# Patient Record
Sex: FEMALE | Race: WHITE | Employment: UNEMPLOYED | URBAN - METROPOLITAN AREA
[De-identification: names, ages, dates, MRNs, and addresses within clinical notes are randomized per-mention and may not be internally consistent; named-entity substitution may affect disease eponyms.]

---

## 2018-11-07 ENCOUNTER — HOSPITAL ENCOUNTER (INPATIENT)
Age: 58
LOS: 1 days | Discharge: HOME | DRG: 383 | End: 2018-11-08
Attending: EMERGENCY MEDICINE | Admitting: INTERNAL MEDICINE
Payer: MEDICAID

## 2018-11-07 DIAGNOSIS — L03.119 CELLULITIS OF LOWER EXTREMITY, UNSPECIFIED LATERALITY: Primary | ICD-10-CM

## 2018-11-07 PROBLEM — R60.0 LEG EDEMA, LEFT: Status: ACTIVE | Noted: 2018-11-07

## 2018-11-07 PROBLEM — R79.89 ELEVATED LFTS: Status: ACTIVE | Noted: 2018-11-07

## 2018-11-07 PROBLEM — F11.10 OPIOID ABUSE (HCC): Status: ACTIVE | Noted: 2018-11-07

## 2018-11-07 PROBLEM — L03.116 CELLULITIS OF LEFT LOWER EXTREMITY: Status: ACTIVE | Noted: 2018-11-07

## 2018-11-07 PROBLEM — G40.909 SEIZURE DISORDER (HCC): Status: ACTIVE | Noted: 2018-11-07

## 2018-11-07 LAB
A/G RATIO: 0.8 (ref 1.1–2.2)
ALBUMIN SERPL-MCNC: 3.1 G/DL (ref 3.4–5)
ALP BLD-CCNC: 105 U/L (ref 40–129)
ALT SERPL-CCNC: 56 U/L (ref 10–40)
AMPHETAMINE SCREEN, URINE: ABNORMAL
ANION GAP SERPL CALCULATED.3IONS-SCNC: 8 MMOL/L (ref 3–16)
APTT: 27.4 SEC (ref 26–36)
AST SERPL-CCNC: 71 U/L (ref 15–37)
BARBITURATE SCREEN URINE: ABNORMAL
BASOPHILS ABSOLUTE: 0.1 K/UL (ref 0–0.2)
BASOPHILS RELATIVE PERCENT: 1.3 %
BENZODIAZEPINE SCREEN, URINE: ABNORMAL
BILIRUB SERPL-MCNC: 0.4 MG/DL (ref 0–1)
BUN BLDV-MCNC: 10 MG/DL (ref 7–20)
CALCIUM SERPL-MCNC: 9.3 MG/DL (ref 8.3–10.6)
CANNABINOID SCREEN URINE: ABNORMAL
CHLORIDE BLD-SCNC: 100 MMOL/L (ref 99–110)
CO2: 31 MMOL/L (ref 21–32)
COCAINE METABOLITE SCREEN URINE: ABNORMAL
CREAT SERPL-MCNC: <0.5 MG/DL (ref 0.6–1.1)
D DIMER: 4243 NG/ML DDU (ref 0–229)
EOSINOPHILS ABSOLUTE: 0.4 K/UL (ref 0–0.6)
EOSINOPHILS RELATIVE PERCENT: 5.5 %
GFR AFRICAN AMERICAN: >60
GFR NON-AFRICAN AMERICAN: >60
GLOBULIN: 3.9 G/DL
GLUCOSE BLD-MCNC: 111 MG/DL (ref 70–99)
HCT VFR BLD CALC: 37.8 % (ref 36–48)
HEMOGLOBIN: 12.6 G/DL (ref 12–16)
INR BLD: 1.04 (ref 0.86–1.14)
LACTIC ACID: 1 MMOL/L (ref 0.4–2)
LYMPHOCYTES ABSOLUTE: 2.5 K/UL (ref 1–5.1)
LYMPHOCYTES RELATIVE PERCENT: 31.1 %
Lab: ABNORMAL
MCH RBC QN AUTO: 29.1 PG (ref 26–34)
MCHC RBC AUTO-ENTMCNC: 33.2 G/DL (ref 31–36)
MCV RBC AUTO: 87.7 FL (ref 80–100)
METHADONE SCREEN, URINE: ABNORMAL
MONOCYTES ABSOLUTE: 0.9 K/UL (ref 0–1.3)
MONOCYTES RELATIVE PERCENT: 10.6 %
NEUTROPHILS ABSOLUTE: 4.2 K/UL (ref 1.7–7.7)
NEUTROPHILS RELATIVE PERCENT: 51.5 %
OPIATE SCREEN URINE: POSITIVE
OXYCODONE URINE: ABNORMAL
PDW BLD-RTO: 16.3 % (ref 12.4–15.4)
PH UA: 6.5
PHENCYCLIDINE SCREEN URINE: ABNORMAL
PLATELET # BLD: 246 K/UL (ref 135–450)
PMV BLD AUTO: 7.5 FL (ref 5–10.5)
POTASSIUM SERPL-SCNC: 4.2 MMOL/L (ref 3.5–5.1)
PROPOXYPHENE SCREEN: ABNORMAL
PROTHROMBIN TIME: 11.8 SEC (ref 9.8–13)
RBC # BLD: 4.31 M/UL (ref 4–5.2)
SODIUM BLD-SCNC: 139 MMOL/L (ref 136–145)
TOTAL PROTEIN: 7 G/DL (ref 6.4–8.2)
WBC # BLD: 8.1 K/UL (ref 4–11)

## 2018-11-07 PROCEDURE — 96372 THER/PROPH/DIAG INJ SC/IM: CPT

## 2018-11-07 PROCEDURE — 6360000002 HC RX W HCPCS: Performed by: PHYSICIAN ASSISTANT

## 2018-11-07 PROCEDURE — 83605 ASSAY OF LACTIC ACID: CPT

## 2018-11-07 PROCEDURE — 36415 COLL VENOUS BLD VENIPUNCTURE: CPT

## 2018-11-07 PROCEDURE — 2580000003 HC RX 258: Performed by: INTERNAL MEDICINE

## 2018-11-07 PROCEDURE — 87040 BLOOD CULTURE FOR BACTERIA: CPT

## 2018-11-07 PROCEDURE — 85379 FIBRIN DEGRADATION QUANT: CPT

## 2018-11-07 PROCEDURE — 2500000003 HC RX 250 WO HCPCS: Performed by: EMERGENCY MEDICINE

## 2018-11-07 PROCEDURE — 85730 THROMBOPLASTIN TIME PARTIAL: CPT

## 2018-11-07 PROCEDURE — 86803 HEPATITIS C AB TEST: CPT

## 2018-11-07 PROCEDURE — 80053 COMPREHEN METABOLIC PANEL: CPT

## 2018-11-07 PROCEDURE — 6360000002 HC RX W HCPCS: Performed by: EMERGENCY MEDICINE

## 2018-11-07 PROCEDURE — 99219 PR INITIAL OBSERVATION CARE/DAY 50 MINUTES: CPT | Performed by: INTERNAL MEDICINE

## 2018-11-07 PROCEDURE — 2580000003 HC RX 258: Performed by: PHYSICIAN ASSISTANT

## 2018-11-07 PROCEDURE — G0378 HOSPITAL OBSERVATION PER HR: HCPCS

## 2018-11-07 PROCEDURE — 80307 DRUG TEST PRSMV CHEM ANLYZR: CPT

## 2018-11-07 PROCEDURE — 85610 PROTHROMBIN TIME: CPT

## 2018-11-07 PROCEDURE — 85025 COMPLETE CBC W/AUTO DIFF WBC: CPT

## 2018-11-07 PROCEDURE — 99284 EMERGENCY DEPT VISIT MOD MDM: CPT

## 2018-11-07 PROCEDURE — 1200000000 HC SEMI PRIVATE

## 2018-11-07 PROCEDURE — 96365 THER/PROPH/DIAG IV INF INIT: CPT

## 2018-11-07 PROCEDURE — 6370000000 HC RX 637 (ALT 250 FOR IP): Performed by: PHYSICIAN ASSISTANT

## 2018-11-07 PROCEDURE — 2500000003 HC RX 250 WO HCPCS: Performed by: INTERNAL MEDICINE

## 2018-11-07 RX ORDER — GABAPENTIN 400 MG/1
800 CAPSULE ORAL 3 TIMES DAILY
Status: DISCONTINUED | OUTPATIENT
Start: 2018-11-07 | End: 2018-11-08 | Stop reason: HOSPADM

## 2018-11-07 RX ORDER — SODIUM CHLORIDE 0.9 % (FLUSH) 0.9 %
10 SYRINGE (ML) INJECTION PRN
Status: DISCONTINUED | OUTPATIENT
Start: 2018-11-07 | End: 2018-11-08 | Stop reason: HOSPADM

## 2018-11-07 RX ORDER — ONDANSETRON 2 MG/ML
4 INJECTION INTRAMUSCULAR; INTRAVENOUS EVERY 6 HOURS PRN
Status: DISCONTINUED | OUTPATIENT
Start: 2018-11-07 | End: 2018-11-08 | Stop reason: HOSPADM

## 2018-11-07 RX ORDER — CLINDAMYCIN PHOSPHATE 600 MG/50ML
600 INJECTION INTRAVENOUS ONCE
Status: COMPLETED | OUTPATIENT
Start: 2018-11-07 | End: 2018-11-07

## 2018-11-07 RX ORDER — CLINDAMYCIN PHOSPHATE 300 MG/50ML
300 INJECTION INTRAVENOUS EVERY 8 HOURS
Status: DISCONTINUED | OUTPATIENT
Start: 2018-11-07 | End: 2018-11-07

## 2018-11-07 RX ORDER — LACTOBACILLUS RHAMNOSUS GG 10B CELL
1 CAPSULE ORAL
Status: DISCONTINUED | OUTPATIENT
Start: 2018-11-08 | End: 2018-11-08 | Stop reason: HOSPADM

## 2018-11-07 RX ORDER — CLINDAMYCIN PHOSPHATE 300 MG/50ML
300 INJECTION INTRAVENOUS EVERY 8 HOURS
Status: DISCONTINUED | OUTPATIENT
Start: 2018-11-07 | End: 2018-11-07 | Stop reason: SDUPTHER

## 2018-11-07 RX ORDER — SODIUM CHLORIDE 0.9 % (FLUSH) 0.9 %
10 SYRINGE (ML) INJECTION EVERY 12 HOURS SCHEDULED
Status: DISCONTINUED | OUTPATIENT
Start: 2018-11-07 | End: 2018-11-08 | Stop reason: HOSPADM

## 2018-11-07 RX ADMIN — ENOXAPARIN SODIUM 50 MG: 60 INJECTION SUBCUTANEOUS at 13:54

## 2018-11-07 RX ADMIN — Medication 10 ML: at 21:32

## 2018-11-07 RX ADMIN — GABAPENTIN 800 MG: 400 CAPSULE ORAL at 16:53

## 2018-11-07 RX ADMIN — ENOXAPARIN SODIUM 50 MG: 100 INJECTION SUBCUTANEOUS at 21:32

## 2018-11-07 RX ADMIN — DEXTROSE MONOHYDRATE 300 MG: 5 INJECTION, SOLUTION INTRAVENOUS at 21:32

## 2018-11-07 RX ADMIN — CLINDAMYCIN PHOSPHATE 600 MG: 12 INJECTION, SOLUTION INTRAVENOUS at 13:01

## 2018-11-07 ASSESSMENT — ENCOUNTER SYMPTOMS
SORE THROAT: 0
VOMITING: 0
NAUSEA: 0
SHORTNESS OF BREATH: 0
DIARRHEA: 0
ABDOMINAL PAIN: 0

## 2018-11-07 ASSESSMENT — PAIN SCALES - GENERAL: PAINLEVEL_OUTOF10: 7

## 2018-11-07 NOTE — ED NOTES
PT IS VERY DROWSY AT THIS TIME. PT PLACED ON O2 DUE TO SPO2 88%. DR ANDREA INFORMED.      Mel Mcgrath, RN  11/07/18 1698

## 2018-11-07 NOTE — ED NOTES
When walked in room pt was dowdy and when I asked if I could cath her she said yes. Mina Contreras in with kit and pt was helping me then she states there is no way she is go;ing to let me cath her. Walked pt to the bathroom and no urine was given. Pt was falling asleep on toilet.  Walked pt back to bed and placed on monitor      Luisa Carson RN  11/07/18 7491

## 2018-11-07 NOTE — ED PROVIDER NOTES
Ira Hermosillo is a 66 y.o. female presents with foot swelling onset four days in her left leg and onset today in her right leg. She states that it started itching and then the swelling came after. She denies heart problems, kidney problems ulcers or diabetes. She smokes one pack a day and started when she was 15years old. She does smoke marijuana but has never used IV drugs. HPI    Review of Systems   Constitutional: Negative for chills and fever. HENT: Negative for congestion and sore throat. Eyes: Negative for visual disturbance. Respiratory: Negative for shortness of breath. Cardiovascular: Negative for chest pain. Gastrointestinal: Negative for abdominal pain, diarrhea, nausea and vomiting. Genitourinary: Negative for difficulty urinating. Musculoskeletal: Negative for neck pain. Skin: Negative for wound. Neurological: Negative for headaches. PAST MEDICAL HISTORY   has a past medical history of Back pain and Seizures ([facility] [address] PAST SURGICAL HISTORY   has a past surgical history that includes Appendectomy; Carpal tunnel release; Tubal ligation; Skin graft;  section; and Rotator cuff repair (Right). FAMILY HISTORY  family history is not on file. SOCIAL HISTORY   reports that she has been smoking. She has been smoking about 1.00 pack per day. She has never used smokeless tobacco. She reports that she does not drink alcohol or use drugs. HOME MEDICATIONS     Prior to Admission medications    Medication Sig Start Date End Date Taking? Authorizing Provider   gabapentin (NEURONTIN) 300 MG capsule Take 800 mg by mouth 3 times daily. take 1 capsule by oral route 3 times every day. 8/26/15 11/29/16  Historical Provider, MD        ALLERGIES  is allergic to flexeril [cyclobenzaprine]; ultram [tramadol hcl]; and robaxin [methocarbamol].       /67   Pulse 100   Temp 98.3 °F (36.8 °C) (Oral)   Resp 16   Ht 5' 6\" (1.676 m)   Wt 108 lb (49 kg)   LMP 2012 SpO2 94%   BMI 17.43 kg/m²      Physical Exam   Constitutional: She is oriented to person, place, and time. She appears well-developed. No distress. HENT:   Head: Normocephalic and atraumatic. Right Ear: Tympanic membrane and external ear normal.   Left Ear: Tympanic membrane and external ear normal.   Mouth/Throat: No oropharyngeal exudate, posterior oropharyngeal edema or posterior oropharyngeal erythema. Eyes: Pupils are equal, round, and reactive to light. Conjunctivae and EOM are normal.   Neck: Normal range of motion. Neck supple. No JVD present. Cardiovascular: Normal rate, regular rhythm and intact distal pulses. Exam reveals no gallop and no friction rub. No murmur heard. Pulmonary/Chest: Effort normal and breath sounds normal. No stridor. No respiratory distress. She has no wheezes. She has no rales. She exhibits no tenderness. Abdominal: Soft. Bowel sounds are normal. She exhibits no distension and no mass. There is no tenderness. There is no rigidity, no rebound and no guarding. Musculoskeletal: Normal range of motion. She exhibits edema. She exhibits no deformity. Neurological: She is alert and oriented to person, place, and time. She has normal strength. No cranial nerve deficit or sensory deficit. She exhibits normal muscle tone. Coordination normal. GCS eye subscore is 4. GCS verbal subscore is 5. GCS motor subscore is 6. Skin: Skin is warm and dry. Capillary refill takes less than 2 seconds. No ecchymosis, no petechiae and no rash noted. She is not diaphoretic. There is erythema. No pallor. Psychiatric: She has a normal mood and affect.  Her behavior is normal.       Procedures    MDM      Labs  Results for orders placed or performed during the hospital encounter of 11/07/18   CBC auto differential   Result Value Ref Range    WBC 8.1 4.0 - 11.0 K/uL    RBC 4.31 4.00 - 5.20 M/uL    Hemoglobin 12.6 12.0 - 16.0 g/dL    Hematocrit 37.8 36.0 - 48.0 %    MCV 87.7 80.0 - 100.0

## 2018-11-08 VITALS
BODY MASS INDEX: 17.36 KG/M2 | OXYGEN SATURATION: 90 % | HEIGHT: 66 IN | WEIGHT: 108 LBS | TEMPERATURE: 97.6 F | RESPIRATION RATE: 18 BRPM | HEART RATE: 74 BPM | SYSTOLIC BLOOD PRESSURE: 94 MMHG | DIASTOLIC BLOOD PRESSURE: 60 MMHG

## 2018-11-08 LAB
ANION GAP SERPL CALCULATED.3IONS-SCNC: 5 MMOL/L (ref 3–16)
BASOPHILS ABSOLUTE: 0.1 K/UL (ref 0–0.2)
BASOPHILS RELATIVE PERCENT: 1.2 %
BUN BLDV-MCNC: 7 MG/DL (ref 7–20)
CALCIUM SERPL-MCNC: 8.5 MG/DL (ref 8.3–10.6)
CHLORIDE BLD-SCNC: 104 MMOL/L (ref 99–110)
CO2: 30 MMOL/L (ref 21–32)
CREAT SERPL-MCNC: <0.5 MG/DL (ref 0.6–1.1)
EOSINOPHILS ABSOLUTE: 0.3 K/UL (ref 0–0.6)
EOSINOPHILS RELATIVE PERCENT: 7.4 %
GFR AFRICAN AMERICAN: >60
GFR NON-AFRICAN AMERICAN: >60
GLUCOSE BLD-MCNC: 88 MG/DL (ref 70–99)
HCT VFR BLD CALC: 39.5 % (ref 36–48)
HEMOGLOBIN: 13.3 G/DL (ref 12–16)
HEPATITIS C ANTIBODY INTERPRETATION: REACTIVE
LYMPHOCYTES ABSOLUTE: 2.1 K/UL (ref 1–5.1)
LYMPHOCYTES RELATIVE PERCENT: 44.4 %
MCH RBC QN AUTO: 29.5 PG (ref 26–34)
MCHC RBC AUTO-ENTMCNC: 33.5 G/DL (ref 31–36)
MCV RBC AUTO: 87.8 FL (ref 80–100)
MONOCYTES ABSOLUTE: 0.5 K/UL (ref 0–1.3)
MONOCYTES RELATIVE PERCENT: 10.4 %
NEUTROPHILS ABSOLUTE: 1.7 K/UL (ref 1.7–7.7)
NEUTROPHILS RELATIVE PERCENT: 36.6 %
PDW BLD-RTO: 16.5 % (ref 12.4–15.4)
PLATELET # BLD: 221 K/UL (ref 135–450)
PMV BLD AUTO: 7.4 FL (ref 5–10.5)
POTASSIUM REFLEX MAGNESIUM: 4.1 MMOL/L (ref 3.5–5.1)
RBC # BLD: 4.5 M/UL (ref 4–5.2)
SODIUM BLD-SCNC: 139 MMOL/L (ref 136–145)
WBC # BLD: 4.7 K/UL (ref 4–11)

## 2018-11-08 PROCEDURE — 6360000002 HC RX W HCPCS: Performed by: PHYSICIAN ASSISTANT

## 2018-11-08 PROCEDURE — 80048 BASIC METABOLIC PNL TOTAL CA: CPT

## 2018-11-08 PROCEDURE — 96366 THER/PROPH/DIAG IV INF ADDON: CPT

## 2018-11-08 PROCEDURE — 93970 EXTREMITY STUDY: CPT

## 2018-11-08 PROCEDURE — 2580000003 HC RX 258: Performed by: PHYSICIAN ASSISTANT

## 2018-11-08 PROCEDURE — 2500000003 HC RX 250 WO HCPCS: Performed by: INTERNAL MEDICINE

## 2018-11-08 PROCEDURE — 2580000003 HC RX 258: Performed by: INTERNAL MEDICINE

## 2018-11-08 PROCEDURE — 85025 COMPLETE CBC W/AUTO DIFF WBC: CPT

## 2018-11-08 PROCEDURE — G0378 HOSPITAL OBSERVATION PER HR: HCPCS

## 2018-11-08 PROCEDURE — 96372 THER/PROPH/DIAG INJ SC/IM: CPT

## 2018-11-08 PROCEDURE — 36415 COLL VENOUS BLD VENIPUNCTURE: CPT

## 2018-11-08 PROCEDURE — 6370000000 HC RX 637 (ALT 250 FOR IP): Performed by: PHYSICIAN ASSISTANT

## 2018-11-08 PROCEDURE — 99217 PR OBSERVATION CARE DISCHARGE MANAGEMENT: CPT | Performed by: INTERNAL MEDICINE

## 2018-11-08 RX ORDER — CLINDAMYCIN HYDROCHLORIDE 150 MG/1
300 CAPSULE ORAL 3 TIMES DAILY
Qty: 42 CAPSULE | Refills: 0 | Status: SHIPPED | OUTPATIENT
Start: 2018-11-08 | End: 2018-11-15

## 2018-11-08 RX ADMIN — GABAPENTIN 800 MG: 400 CAPSULE ORAL at 08:31

## 2018-11-08 RX ADMIN — DEXTROSE MONOHYDRATE 300 MG: 5 INJECTION, SOLUTION INTRAVENOUS at 05:38

## 2018-11-08 RX ADMIN — ENOXAPARIN SODIUM 50 MG: 100 INJECTION SUBCUTANEOUS at 08:31

## 2018-11-08 RX ADMIN — Medication 10 ML: at 08:32

## 2018-11-08 RX ADMIN — Medication 1 CAPSULE: at 08:31

## 2018-11-08 NOTE — FLOWSHEET NOTE
11/08/18 0803   Vital Signs   Temp 97.6 °F (36.4 °C)   Temp Source Oral   Pulse 74   Heart Rate Source Monitor   Resp 18   BP 94/60   BP Location Left lower arm   BP Upper/Lower Lower   Patient Position Semi fowlers   Level of Consciousness 0   MEWS Score 2   Oxygen Therapy   SpO2 90 %   O2 Device None (Room air)   Pt laying in bed on right side. Easily arousable. AM assessment complete. Call light in reach.

## 2018-11-08 NOTE — PLAN OF CARE
Problem: Safety:  Goal: Free from accidental physical injury  Free from accidental physical injury   Outcome: Ongoing      Problem: Pain:  Goal: Patient's pain/discomfort is manageable  Patient's pain/discomfort is manageable   Outcome: Ongoing      Problem: Pain:  Goal: Pain level will decrease  Pain level will decrease   Outcome: Ongoing

## 2018-11-08 NOTE — DISCHARGE SUMMARY
Where to Get Your Medications      These medications were sent to  [facility], [address] [facility] [address]    Phone:  [phone]   · clindamycin 150 MG capsule           Discharged in stable condition to home    Follow Up:   Follow up with PCP in 1 week and GI for follow up for +Hep C Ab    Carolina Mcnulty 11:50 AM 11/8/2018

## 2018-11-08 NOTE — PROGRESS NOTES
Shift assessment completed. Pt is alert and oriented. Pt unable to stay awake. Keeps closing eyes mid sentence. When ask her why she is having issues staying awake she states she lost a cousin yesterday and did not get any sleep. Vital signs are WNL. Respirations are even & easy. Pt complains of pain in her back and request her gabapentin. Explained to her she will not receive the medication until she can remain more alert. Pt becomes upset stating she wont be able to sleep without it. I told patient if she has problems sleeping to let me know. Pt asleep before this nurse leaves room. SR up x 2 and bed in low position. Call light is within reach. Will monitor.

## 2018-11-13 LAB
BLOOD CULTURE, ROUTINE: NORMAL
CULTURE, BLOOD 2: NORMAL

## 2019-01-26 ENCOUNTER — HOSPITAL ENCOUNTER (EMERGENCY)
Age: 59
Discharge: HOME | End: 2019-01-26
Attending: EMERGENCY MEDICINE
Payer: MEDICAID

## 2019-01-26 ENCOUNTER — APPOINTMENT (OUTPATIENT)
Dept: CT IMAGING | Age: 59
End: 2019-01-26
Payer: MEDICAID

## 2019-01-26 VITALS
SYSTOLIC BLOOD PRESSURE: 123 MMHG | OXYGEN SATURATION: 98 % | WEIGHT: 128 LBS | HEART RATE: 78 BPM | RESPIRATION RATE: 12 BRPM | TEMPERATURE: 98.1 F | DIASTOLIC BLOOD PRESSURE: 74 MMHG | HEIGHT: 67 IN | BODY MASS INDEX: 20.09 KG/M2

## 2019-01-26 DIAGNOSIS — R10.30 LOWER ABDOMINAL PAIN: Primary | ICD-10-CM

## 2019-01-26 LAB
A/G RATIO: 0.7 (ref 1.1–2.2)
ALBUMIN SERPL-MCNC: 3.3 G/DL (ref 3.4–5)
ALP BLD-CCNC: 125 U/L (ref 40–129)
ALT SERPL-CCNC: 65 U/L (ref 10–40)
ANION GAP SERPL CALCULATED.3IONS-SCNC: 8 MMOL/L (ref 3–16)
AST SERPL-CCNC: 77 U/L (ref 15–37)
BASOPHILS ABSOLUTE: 0.1 K/UL (ref 0–0.2)
BASOPHILS RELATIVE PERCENT: 1.7 %
BILIRUB SERPL-MCNC: 0.4 MG/DL (ref 0–1)
BILIRUBIN URINE: NEGATIVE
BLOOD, URINE: NEGATIVE
BUN BLDV-MCNC: 8 MG/DL (ref 7–20)
CALCIUM SERPL-MCNC: 9.2 MG/DL (ref 8.3–10.6)
CHLORIDE BLD-SCNC: 104 MMOL/L (ref 99–110)
CLARITY: CLEAR
CO2: 27 MMOL/L (ref 21–32)
COLOR: YELLOW
CREAT SERPL-MCNC: <0.5 MG/DL (ref 0.6–1.1)
EOSINOPHILS ABSOLUTE: 0.3 K/UL (ref 0–0.6)
EOSINOPHILS RELATIVE PERCENT: 4.1 %
GFR AFRICAN AMERICAN: >60
GFR NON-AFRICAN AMERICAN: >60
GLOBULIN: 4.6 G/DL
GLUCOSE BLD-MCNC: 100 MG/DL (ref 70–99)
GLUCOSE URINE: NEGATIVE MG/DL
HCT VFR BLD CALC: 42.1 % (ref 36–48)
HEMOGLOBIN: 13.7 G/DL (ref 12–16)
INR BLD: 1.05 (ref 0.86–1.14)
KETONES, URINE: NEGATIVE MG/DL
LEUKOCYTE ESTERASE, URINE: NEGATIVE
LYMPHOCYTES ABSOLUTE: 2.5 K/UL (ref 1–5.1)
LYMPHOCYTES RELATIVE PERCENT: 30.7 %
MAGNESIUM: 1.8 MG/DL (ref 1.8–2.4)
MCH RBC QN AUTO: 29.2 PG (ref 26–34)
MCHC RBC AUTO-ENTMCNC: 32.6 G/DL (ref 31–36)
MCV RBC AUTO: 89.4 FL (ref 80–100)
MICROSCOPIC EXAMINATION: NORMAL
MONOCYTES ABSOLUTE: 0.6 K/UL (ref 0–1.3)
MONOCYTES RELATIVE PERCENT: 6.7 %
NEUTROPHILS ABSOLUTE: 4.7 K/UL (ref 1.7–7.7)
NEUTROPHILS RELATIVE PERCENT: 56.8 %
NITRITE, URINE: NEGATIVE
PDW BLD-RTO: 15.1 % (ref 12.4–15.4)
PH UA: 6
PLATELET # BLD: 294 K/UL (ref 135–450)
PMV BLD AUTO: 7.9 FL (ref 5–10.5)
POTASSIUM SERPL-SCNC: 4.4 MMOL/L (ref 3.5–5.1)
PROTEIN UA: NEGATIVE MG/DL
PROTHROMBIN TIME: 12 SEC (ref 9.8–13)
RBC # BLD: 4.71 M/UL (ref 4–5.2)
SODIUM BLD-SCNC: 139 MMOL/L (ref 136–145)
SPECIFIC GRAVITY UA: <=1.005
TOTAL PROTEIN: 7.9 G/DL (ref 6.4–8.2)
URINE TYPE: NORMAL
UROBILINOGEN, URINE: 0.2 E.U./DL
WBC # BLD: 8.3 K/UL (ref 4–11)

## 2019-01-26 PROCEDURE — 83735 ASSAY OF MAGNESIUM: CPT

## 2019-01-26 PROCEDURE — 85610 PROTHROMBIN TIME: CPT

## 2019-01-26 PROCEDURE — 36415 COLL VENOUS BLD VENIPUNCTURE: CPT

## 2019-01-26 PROCEDURE — 85025 COMPLETE CBC W/AUTO DIFF WBC: CPT

## 2019-01-26 PROCEDURE — 99284 EMERGENCY DEPT VISIT MOD MDM: CPT

## 2019-01-26 PROCEDURE — 2580000003 HC RX 258: Performed by: EMERGENCY MEDICINE

## 2019-01-26 PROCEDURE — 6360000004 HC RX CONTRAST MEDICATION: Performed by: EMERGENCY MEDICINE

## 2019-01-26 PROCEDURE — 74177 CT ABD & PELVIS W/CONTRAST: CPT

## 2019-01-26 PROCEDURE — 80053 COMPREHEN METABOLIC PANEL: CPT

## 2019-01-26 PROCEDURE — 81003 URINALYSIS AUTO W/O SCOPE: CPT

## 2019-01-26 RX ORDER — 0.9 % SODIUM CHLORIDE 0.9 %
1000 INTRAVENOUS SOLUTION INTRAVENOUS ONCE
Status: COMPLETED | OUTPATIENT
Start: 2019-01-26 | End: 2019-01-26

## 2019-01-26 RX ORDER — ONDANSETRON HYDROCHLORIDE 8 MG/1
8 TABLET, FILM COATED ORAL EVERY 8 HOURS PRN
Qty: 10 TABLET | Refills: 0 | Status: SHIPPED | OUTPATIENT
Start: 2019-01-26 | End: 2019-05-25 | Stop reason: ALTCHOICE

## 2019-01-26 RX ORDER — DIPHENOXYLATE HYDROCHLORIDE AND ATROPINE SULFATE 2.5; .025 MG/1; MG/1
1 TABLET ORAL 4 TIMES DAILY PRN
Qty: 10 TABLET | Refills: 0 | Status: SHIPPED | OUTPATIENT
Start: 2019-01-26 | End: 2019-02-05

## 2019-01-26 RX ADMIN — SODIUM CHLORIDE 1000 ML: 9 INJECTION, SOLUTION INTRAVENOUS at 15:56

## 2019-01-26 RX ADMIN — IOPAMIDOL 75 ML: 755 INJECTION, SOLUTION INTRAVENOUS at 16:53

## 2019-01-26 ASSESSMENT — ENCOUNTER SYMPTOMS
ABDOMINAL PAIN: 1
SHORTNESS OF BREATH: 0
CONSTIPATION: 0
ABDOMINAL DISTENTION: 0
BACK PAIN: 0
NAUSEA: 1
DIARRHEA: 1
RECTAL PAIN: 0
VOMITING: 0

## 2019-01-26 ASSESSMENT — PAIN SCALES - GENERAL: PAINLEVEL_OUTOF10: 6

## 2019-01-26 ASSESSMENT — PAIN DESCRIPTION - LOCATION: LOCATION: ABDOMEN

## 2019-01-26 ASSESSMENT — PAIN DESCRIPTION - ORIENTATION: ORIENTATION: MID;RIGHT

## 2019-01-26 ASSESSMENT — PAIN DESCRIPTION - DESCRIPTORS: DESCRIPTORS: SHARP

## 2019-01-26 ASSESSMENT — PAIN DESCRIPTION - PAIN TYPE: TYPE: ACUTE PAIN

## 2019-05-25 ENCOUNTER — HOSPITAL ENCOUNTER (INPATIENT)
Age: 59
LOS: 2 days | Discharge: LEFT BEFORE DISCHARGE | DRG: 812 | End: 2019-05-27
Attending: HOSPITALIST | Admitting: HOSPITALIST
Payer: MEDICAID

## 2019-05-25 ENCOUNTER — APPOINTMENT (OUTPATIENT)
Dept: GENERAL RADIOLOGY | Age: 59
End: 2019-05-25
Payer: MEDICAID

## 2019-05-25 ENCOUNTER — APPOINTMENT (OUTPATIENT)
Dept: CT IMAGING | Age: 59
End: 2019-05-25
Payer: MEDICAID

## 2019-05-25 ENCOUNTER — HOSPITAL ENCOUNTER (EMERGENCY)
Age: 59
Discharge: ANOTHER HOSPITAL | End: 2019-05-25
Attending: EMERGENCY MEDICINE
Payer: MEDICAID

## 2019-05-25 VITALS
DIASTOLIC BLOOD PRESSURE: 72 MMHG | HEART RATE: 92 BPM | SYSTOLIC BLOOD PRESSURE: 109 MMHG | OXYGEN SATURATION: 97 % | BODY MASS INDEX: 18.83 KG/M2 | TEMPERATURE: 98.2 F | RESPIRATION RATE: 20 BRPM | WEIGHT: 120 LBS | HEIGHT: 67 IN

## 2019-05-25 DIAGNOSIS — F15.10 METHAMPHETAMINE ABUSE (HCC): ICD-10-CM

## 2019-05-25 DIAGNOSIS — R56.9 SEIZURE (HCC): ICD-10-CM

## 2019-05-25 DIAGNOSIS — F15.929 METHAMPHETAMINE INTOXICATION (HCC): Primary | ICD-10-CM

## 2019-05-25 PROBLEM — G93.40 ACUTE ENCEPHALOPATHY: Status: ACTIVE | Noted: 2019-05-25

## 2019-05-25 LAB
A/G RATIO: 0.7 (ref 1.1–2.2)
ACETAMINOPHEN LEVEL: <5 UG/ML (ref 10–30)
ALBUMIN SERPL-MCNC: 3.1 G/DL (ref 3.4–5)
ALBUMIN SERPL-MCNC: 3.5 G/DL (ref 3.4–5)
ALP BLD-CCNC: 132 U/L (ref 40–129)
ALP BLD-CCNC: 152 U/L (ref 40–129)
ALT SERPL-CCNC: 106 U/L (ref 10–40)
ALT SERPL-CCNC: 92 U/L (ref 10–40)
AMPHETAMINE SCREEN, URINE: POSITIVE
ANION GAP SERPL CALCULATED.3IONS-SCNC: 10 MMOL/L (ref 3–16)
ANION GAP SERPL CALCULATED.3IONS-SCNC: 13 MMOL/L (ref 3–16)
AST SERPL-CCNC: 67 U/L (ref 15–37)
AST SERPL-CCNC: 86 U/L (ref 15–37)
BARBITURATE SCREEN URINE: ABNORMAL
BASE EXCESS VENOUS: -1.7 MMOL/L (ref -3–3)
BASOPHILS ABSOLUTE: 0 K/UL (ref 0–0.2)
BASOPHILS ABSOLUTE: 0.1 K/UL (ref 0–0.2)
BASOPHILS RELATIVE PERCENT: 0.3 %
BASOPHILS RELATIVE PERCENT: 0.4 %
BENZODIAZEPINE SCREEN, URINE: ABNORMAL
BILIRUB SERPL-MCNC: 0.6 MG/DL (ref 0–1)
BILIRUB SERPL-MCNC: 0.7 MG/DL (ref 0–1)
BILIRUBIN DIRECT: <0.2 MG/DL (ref 0–0.3)
BILIRUBIN, INDIRECT: ABNORMAL MG/DL (ref 0–1)
BUN BLDV-MCNC: 5 MG/DL (ref 7–20)
BUN BLDV-MCNC: 8 MG/DL (ref 7–20)
CALCIUM SERPL-MCNC: 8.9 MG/DL (ref 8.3–10.6)
CALCIUM SERPL-MCNC: 9.2 MG/DL (ref 8.3–10.6)
CANNABINOID SCREEN URINE: POSITIVE
CARBOXYHEMOGLOBIN: 3.7 % (ref 0–1.5)
CHLORIDE BLD-SCNC: 100 MMOL/L (ref 99–110)
CHLORIDE BLD-SCNC: 104 MMOL/L (ref 99–110)
CO2: 21 MMOL/L (ref 21–32)
CO2: 22 MMOL/L (ref 21–32)
COCAINE METABOLITE SCREEN URINE: ABNORMAL
CREAT SERPL-MCNC: 0.6 MG/DL (ref 0.6–1.1)
CREAT SERPL-MCNC: <0.5 MG/DL (ref 0.6–1.1)
EKG ATRIAL RATE: 88 BPM
EKG DIAGNOSIS: NORMAL
EKG P AXIS: 77 DEGREES
EKG P-R INTERVAL: 134 MS
EKG Q-T INTERVAL: 376 MS
EKG QRS DURATION: 78 MS
EKG QTC CALCULATION (BAZETT): 454 MS
EKG R AXIS: 70 DEGREES
EKG T AXIS: 65 DEGREES
EKG VENTRICULAR RATE: 88 BPM
EOSINOPHILS ABSOLUTE: 0 K/UL (ref 0–0.6)
EOSINOPHILS ABSOLUTE: 0 K/UL (ref 0–0.6)
EOSINOPHILS RELATIVE PERCENT: 0 %
EOSINOPHILS RELATIVE PERCENT: 0 %
ETHANOL: NORMAL MG/DL (ref 0–0.08)
GFR AFRICAN AMERICAN: >60
GFR AFRICAN AMERICAN: >60
GFR NON-AFRICAN AMERICAN: >60
GFR NON-AFRICAN AMERICAN: >60
GLOBULIN: 4.8 G/DL
GLUCOSE BLD-MCNC: 162 MG/DL (ref 70–99)
GLUCOSE BLD-MCNC: 170 MG/DL (ref 70–99)
HCO3 VENOUS: 22.5 MMOL/L (ref 23–29)
HCT VFR BLD CALC: 44.1 % (ref 36–48)
HCT VFR BLD CALC: 45 % (ref 36–48)
HEMOGLOBIN: 14.5 G/DL (ref 12–16)
HEMOGLOBIN: 14.6 G/DL (ref 12–16)
LACTIC ACID: 2.4 MMOL/L (ref 0.4–2)
LYMPHOCYTES ABSOLUTE: 1.5 K/UL (ref 1–5.1)
LYMPHOCYTES ABSOLUTE: 3 K/UL (ref 1–5.1)
LYMPHOCYTES RELATIVE PERCENT: 18.4 %
LYMPHOCYTES RELATIVE PERCENT: 8.7 %
Lab: ABNORMAL
MAGNESIUM: 2.3 MG/DL (ref 1.8–2.4)
MCH RBC QN AUTO: 28.2 PG (ref 26–34)
MCH RBC QN AUTO: 28.8 PG (ref 26–34)
MCHC RBC AUTO-ENTMCNC: 32.5 G/DL (ref 31–36)
MCHC RBC AUTO-ENTMCNC: 32.9 G/DL (ref 31–36)
MCV RBC AUTO: 86.8 FL (ref 80–100)
MCV RBC AUTO: 87.7 FL (ref 80–100)
METHADONE SCREEN, URINE: ABNORMAL
METHEMOGLOBIN VENOUS: 0.1 %
MONOCYTES ABSOLUTE: 0.6 K/UL (ref 0–1.3)
MONOCYTES ABSOLUTE: 1.2 K/UL (ref 0–1.3)
MONOCYTES RELATIVE PERCENT: 3.6 %
MONOCYTES RELATIVE PERCENT: 7.2 %
NEUTROPHILS ABSOLUTE: 12.5 K/UL (ref 1.7–7.7)
NEUTROPHILS ABSOLUTE: 14.3 K/UL (ref 1.7–7.7)
NEUTROPHILS RELATIVE PERCENT: 77.7 %
NEUTROPHILS RELATIVE PERCENT: 83.7 %
O2 CONTENT, VEN: 16 VOL %
O2 SAT, VEN: 78 %
O2 THERAPY: ABNORMAL
OPIATE SCREEN URINE: POSITIVE
OXYCODONE URINE: ABNORMAL
PCO2, VEN: 36.8 MMHG (ref 40–50)
PDW BLD-RTO: 15.9 % (ref 12.4–15.4)
PDW BLD-RTO: 16 % (ref 12.4–15.4)
PH UA: 5
PH VENOUS: 7.4 (ref 7.35–7.45)
PHENCYCLIDINE SCREEN URINE: ABNORMAL
PLATELET # BLD: 263 K/UL (ref 135–450)
PLATELET # BLD: 289 K/UL (ref 135–450)
PMV BLD AUTO: 8 FL (ref 5–10.5)
PMV BLD AUTO: 8 FL (ref 5–10.5)
PO2, VEN: 41.9 MMHG (ref 25–40)
POTASSIUM REFLEX MAGNESIUM: 3.4 MMOL/L (ref 3.5–5.1)
POTASSIUM SERPL-SCNC: 3.3 MMOL/L (ref 3.5–5.1)
PROPOXYPHENE SCREEN: ABNORMAL
RBC # BLD: 5.03 M/UL (ref 4–5.2)
RBC # BLD: 5.18 M/UL (ref 4–5.2)
SALICYLATE, SERUM: <0.3 MG/DL (ref 15–30)
SODIUM BLD-SCNC: 134 MMOL/L (ref 136–145)
SODIUM BLD-SCNC: 136 MMOL/L (ref 136–145)
TCO2 CALC VENOUS: 24 MMOL/L
TOTAL CK: 154 U/L (ref 26–192)
TOTAL PROTEIN: 7.6 G/DL (ref 6.4–8.2)
TOTAL PROTEIN: 8.3 G/DL (ref 6.4–8.2)
TRICYCLIC, URINE: NORMAL
TROPONIN: <0.01 NG/ML
WBC # BLD: 16.1 K/UL (ref 4–11)
WBC # BLD: 17 K/UL (ref 4–11)

## 2019-05-25 PROCEDURE — 71045 X-RAY EXAM CHEST 1 VIEW: CPT

## 2019-05-25 PROCEDURE — G0480 DRUG TEST DEF 1-7 CLASSES: HCPCS

## 2019-05-25 PROCEDURE — 93005 ELECTROCARDIOGRAM TRACING: CPT | Performed by: EMERGENCY MEDICINE

## 2019-05-25 PROCEDURE — 6360000002 HC RX W HCPCS

## 2019-05-25 PROCEDURE — 2580000003 HC RX 258: Performed by: EMERGENCY MEDICINE

## 2019-05-25 PROCEDURE — 6360000002 HC RX W HCPCS: Performed by: HOSPITALIST

## 2019-05-25 PROCEDURE — 80076 HEPATIC FUNCTION PANEL: CPT

## 2019-05-25 PROCEDURE — 6370000000 HC RX 637 (ALT 250 FOR IP): Performed by: NURSE PRACTITIONER

## 2019-05-25 PROCEDURE — 83735 ASSAY OF MAGNESIUM: CPT

## 2019-05-25 PROCEDURE — 84484 ASSAY OF TROPONIN QUANT: CPT

## 2019-05-25 PROCEDURE — 87086 URINE CULTURE/COLONY COUNT: CPT

## 2019-05-25 PROCEDURE — 85025 COMPLETE CBC W/AUTO DIFF WBC: CPT

## 2019-05-25 PROCEDURE — 80053 COMPREHEN METABOLIC PANEL: CPT

## 2019-05-25 PROCEDURE — 6370000000 HC RX 637 (ALT 250 FOR IP): Performed by: HOSPITALIST

## 2019-05-25 PROCEDURE — 83605 ASSAY OF LACTIC ACID: CPT

## 2019-05-25 PROCEDURE — 87040 BLOOD CULTURE FOR BACTERIA: CPT

## 2019-05-25 PROCEDURE — 99291 CRITICAL CARE FIRST HOUR: CPT

## 2019-05-25 PROCEDURE — 2060000000 HC ICU INTERMEDIATE R&B

## 2019-05-25 PROCEDURE — 36415 COLL VENOUS BLD VENIPUNCTURE: CPT

## 2019-05-25 PROCEDURE — 82550 ASSAY OF CK (CPK): CPT

## 2019-05-25 PROCEDURE — 82803 BLOOD GASES ANY COMBINATION: CPT

## 2019-05-25 PROCEDURE — 80307 DRUG TEST PRSMV CHEM ANLYZR: CPT

## 2019-05-25 PROCEDURE — 80048 BASIC METABOLIC PNL TOTAL CA: CPT

## 2019-05-25 PROCEDURE — 93010 ELECTROCARDIOGRAM REPORT: CPT | Performed by: INTERNAL MEDICINE

## 2019-05-25 PROCEDURE — 2580000003 HC RX 258: Performed by: HOSPITALIST

## 2019-05-25 PROCEDURE — 96374 THER/PROPH/DIAG INJ IV PUSH: CPT

## 2019-05-25 PROCEDURE — 70450 CT HEAD/BRAIN W/O DYE: CPT

## 2019-05-25 RX ORDER — GABAPENTIN 400 MG/1
800 CAPSULE ORAL 2 TIMES DAILY
Status: DISCONTINUED | OUTPATIENT
Start: 2019-05-25 | End: 2019-05-25

## 2019-05-25 RX ORDER — LORAZEPAM 2 MG/ML
INJECTION INTRAMUSCULAR
Status: COMPLETED
Start: 2019-05-25 | End: 2019-05-25

## 2019-05-25 RX ORDER — GABAPENTIN 400 MG/1
400 CAPSULE ORAL 2 TIMES DAILY
Status: DISCONTINUED | OUTPATIENT
Start: 2019-05-25 | End: 2019-05-27 | Stop reason: HOSPADM

## 2019-05-25 RX ORDER — HALOPERIDOL 5 MG/ML
5 INJECTION INTRAMUSCULAR EVERY 4 HOURS PRN
Status: DISCONTINUED | OUTPATIENT
Start: 2019-05-25 | End: 2019-05-27 | Stop reason: HOSPADM

## 2019-05-25 RX ORDER — GABAPENTIN 400 MG/1
400 CAPSULE ORAL 2 TIMES DAILY
Status: DISCONTINUED | OUTPATIENT
Start: 2019-05-26 | End: 2019-05-25

## 2019-05-25 RX ORDER — POTASSIUM CHLORIDE 20 MEQ/1
40 TABLET, EXTENDED RELEASE ORAL ONCE
Status: COMPLETED | OUTPATIENT
Start: 2019-05-25 | End: 2019-05-25

## 2019-05-25 RX ORDER — SODIUM CHLORIDE 0.9 % (FLUSH) 0.9 %
10 SYRINGE (ML) INJECTION EVERY 12 HOURS SCHEDULED
Status: DISCONTINUED | OUTPATIENT
Start: 2019-05-25 | End: 2019-05-27 | Stop reason: HOSPADM

## 2019-05-25 RX ORDER — SODIUM CHLORIDE 0.9 % (FLUSH) 0.9 %
10 SYRINGE (ML) INJECTION PRN
Status: DISCONTINUED | OUTPATIENT
Start: 2019-05-25 | End: 2019-05-27 | Stop reason: HOSPADM

## 2019-05-25 RX ORDER — SODIUM CHLORIDE 9 MG/ML
INJECTION, SOLUTION INTRAVENOUS CONTINUOUS
Status: DISCONTINUED | OUTPATIENT
Start: 2019-05-25 | End: 2019-05-27 | Stop reason: HOSPADM

## 2019-05-25 RX ORDER — ONDANSETRON 2 MG/ML
4 INJECTION INTRAMUSCULAR; INTRAVENOUS EVERY 6 HOURS PRN
Status: DISCONTINUED | OUTPATIENT
Start: 2019-05-25 | End: 2019-05-27 | Stop reason: HOSPADM

## 2019-05-25 RX ORDER — SODIUM CHLORIDE 9 MG/ML
INJECTION, SOLUTION INTRAVENOUS CONTINUOUS
Status: DISCONTINUED | OUTPATIENT
Start: 2019-05-25 | End: 2019-05-25 | Stop reason: HOSPADM

## 2019-05-25 RX ADMIN — SODIUM CHLORIDE, PRESERVATIVE FREE 10 ML: 5 INJECTION INTRAVENOUS at 21:47

## 2019-05-25 RX ADMIN — LORAZEPAM 2 MG: 2 INJECTION INTRAMUSCULAR; INTRAVENOUS at 12:42

## 2019-05-25 RX ADMIN — LEVETIRACETAM 1000 MG: 100 INJECTION, SOLUTION INTRAVENOUS at 18:18

## 2019-05-25 RX ADMIN — POTASSIUM CHLORIDE 40 MEQ: 20 TABLET, EXTENDED RELEASE ORAL at 21:46

## 2019-05-25 RX ADMIN — LORAZEPAM 2 MG: 2 INJECTION INTRAMUSCULAR; INTRAVENOUS at 12:34

## 2019-05-25 RX ADMIN — SODIUM CHLORIDE: 9 INJECTION, SOLUTION INTRAVENOUS at 17:44

## 2019-05-25 RX ADMIN — SODIUM CHLORIDE: 9 INJECTION, SOLUTION INTRAVENOUS at 15:54

## 2019-05-25 RX ADMIN — GABAPENTIN 400 MG: 400 CAPSULE ORAL at 21:47

## 2019-05-25 ASSESSMENT — ENCOUNTER SYMPTOMS: INGESTION: 1

## 2019-05-25 NOTE — ED NOTES
sats 88% placed on 2l per nc. sats up to 95%. Pt requesting for arm to bed let go. Still moving all over bed. Instructed pt I will let right arm be released if she stops pulling at cords. Right arm released.       Zahra Garcia RN  05/25/19 0400

## 2019-05-25 NOTE — PROGRESS NOTES
Pt is alert orient calm and cooperative. She is watching tv. She ordered her cheeseburger with assistance. Pt could not tell me the year. She knew the president and month. keppra infusing.

## 2019-05-25 NOTE — ED NOTES
St cath for urine.  Pt constantly fighting and pulling leads off     Jalen Thacker, BRAULIO  05/25/19 1982

## 2019-05-25 NOTE — ED NOTES
Bed: 04  Expected date: 5/25/19  Expected time: 11:48 AM  Means of arrival:   Comments:     Estefany Deleon RN  05/25/19 3006

## 2019-05-25 NOTE — PROGRESS NOTES
Patient settled into room 446. Necessities given. Oxygen therapy at 2 liters nc. Telemetry box number 84 on patient and confirmed with cmu. Seizure pads in place. Pt was up to the bsc independently. Pt is wiry. Fidgety vs wnl. Pt voided without difficulties. Pt is alert and oriented. Pts actions are spontaneous and has little aspect to her tubing and rails. Reviewed poc with patient. Reviewed safety procedures. Safety alarm in place and active. Requested a visual monitor since pt had a witnessed seizure in the er at [facility].

## 2019-05-25 NOTE — ED NOTES
Multiple staff attempting to hold pt down in bed. Unsuccessful.  Placed soft wrist restraints on bilat wrist. Pt still pulling herself to bottom of bed. bilat soft restraints placed on bilat ankles     Ionaa Ogden RN  05/25/19 7353

## 2019-05-25 NOTE — ED NOTES
Strategic present. Report at bedside.  Pt loaded on their cot left en route to [facility]     Lokesh Bueno RN  05/25/19 2904

## 2019-05-25 NOTE — H&P
Hospital Medicine History & Physical      PCP: Henrene Sacks, PA-C    Date of Admission: 2019    Date of Service: Pt seen/examined on 19 and Admitted to Inpatient with expected LOS greater than two midnights due to medical therapy    Chief Complaint:  Agitation, seizure    History Of Present Illness:    66 y.o. female who presented to [facility] as a transfer from the [facility] the [facility] ED. Hx obtained from [address] [place] [address] Rd and OSH ED team given pt somnolent at present. Pt was noted by bystanders to be yelling and screaming while driving her car down the road. 911 called and pt brought to [facility] and the [facility] ED. Was very combative req Ativan. Noted to have witnessed tonic clonic seizure, provided additional IV Ativan and sent to [facility] for further care. Med records suggest pt hx seizure. ED team advised me pt admitted to IV amphetamine use regularly. Past Medical History:          Diagnosis Date    Back pain     Hepatitis C antibody positive in blood 2018    Seizures (HCC)        Past Surgical History:          Procedure Laterality Date    APPENDECTOMY      CARPAL TUNNEL RELEASE       SECTION      ROTATOR CUFF REPAIR Right     SKIN GRAFT      TUBAL LIGATION         Medications Prior to Admission:      Prior to Admission medications    Not on File       Allergies:  Flexeril [cyclobenzaprine]; Ultram [tramadol hcl]; and Robaxin [methocarbamol]    Social History:      The patient currently lives at home    TOBACCO:   reports that she has been smoking. She has been smoking about 1.00 pack per day. She has never used smokeless tobacco.  ETOH:   reports that she does not drink alcohol. Family History:      Reviewed in detail and negative for DM, CAD, Cancer, CVA. REVIEW OF SYSTEMS:   Pertinent positives as noted in the HPI. All other systems reviewed and negative.     PHYSICAL EXAM PERFORMED:    /67   Pulse 84   Temp 98.4 °F (36.9 °C) (Oral)   Resp 20   Ht 5' 6\" (1.676 m)   Wt 120 lb (54.4 kg)   [facility] 11/12/2012   SpO2 93%   BMI 19.37 kg/m²     General appearance:  Somnolent, no nuchal rigidity  HEENT:  Normal cephalic, atraumatic without obvious deformity. Pupils equal, round, and reactive to light. Extra ocular muscles intact. Conjunctivae/corneas clear. Neck: Supple, with full range of motion. No jugular venous distention. Trachea midline. Respiratory:  Normal respiratory effort. Clear to auscultation, bilaterally without Rales/Wheezes/Rhonchi. Cardiovascular:  Regular rate and rhythm with normal S1/S2 without murmurs, rubs or gallops. Abdomen: Soft, non-tender, non-distended with normal bowel sounds. Musculoskeletal:  No clubbing, cyanosis or edema bilaterally. Full range of motion without deformity. Skin: Skin color, texture, turgor normal.  No rashes or lesions. Neurologic:  Neurovascularly intact without any focal sensory/motor deficits. Psychiatric:  Alert and oriented to 0 at present, no agitation currently  Capillary Refill: Brisk,< 3 seconds   Peripheral Pulses: +2 palpable, equal bilaterally       Labs:     Recent Labs     05/25/19  1305 05/25/19  1812   WBC 17.0* 16.1*   HGB 14.6 14.5   HCT 45.0 44.1    263     Recent Labs     05/25/19  1305   *   K 3.3*      CO2 21   BUN 8   CREATININE 0.6   CALCIUM 9.2     Recent Labs     05/25/19  1305   AST 86*   *   BILITOT 0.6   ALKPHOS 152*     No results for input(s): INR in the last 72 hours. Recent Labs     05/25/19  1305   CKTOTAL 154   TROPONINI <0.01       Urinalysis:      Lab Results   Component Value Date    NITRU Negative 01/26/2019    BLOODU Negative 01/26/2019    SPECGRAV <=1.005 01/26/2019    GLUCOSEU Negative 01/26/2019       Radiology:     CXR: I have reviewed the CXR with the following interpretation: NAD  EKG:  I have reviewed the EKG with the following interpretation: NSR  CT Head OSH (negative)    ASSESSMENT:    1. Drug Overdose, suspected amphetamine use  2.  Seizure, hx of same, suspect

## 2019-05-25 NOTE — ED NOTES
Return from ct. Restraints not replaced. Pt resting in bed on monitor.  Following commands at this time     Roxy Ledezma RN  05/25/19 0521

## 2019-05-25 NOTE — PROGRESS NOTES
5/25/19 6:15 PM   446-ervin darnell, pt states Neurontin 800 mg bid po. she is requesting this medication please.  ty  Unread

## 2019-05-25 NOTE — ED PROVIDER NOTES
Pt was brought by EMS for possible drug overdose. Pt was reported to have been  injecting Meth amphetamines and was found in her car and the people she lives with called the squad because she was acting unusual.  I'm called to the room as nurses have witnessed a seizure. It was very brief in nature patient was given Ativan and was agitated following the seizure. It lasted a matter of seconds. Patient is unable to give review of systems but when I speak to her aspirin focus she is able to follow commands such as opening her mouth and turn her head so that I can examine her. The history is provided by the EMS personnel. No  was used. Ingestion   Ingested substance:  Illicit drugs  Suspected agents: methamphetamine. Witnesses present: no    Called poison control: no    Incident location:  Unable to specify  Context: recreational        Review of Systems   Unable to perform ROS: Acuity of condition       Physical Exam   Constitutional: She is oriented to person, place, and time. She appears well-developed. No distress. HENT:   Head: Normocephalic and atraumatic. Right Ear: Tympanic membrane and external ear normal.   Left Ear: Tympanic membrane and external ear normal.   Mouth/Throat: Oropharynx is clear and moist. No oropharyngeal exudate, posterior oropharyngeal edema or posterior oropharyngeal erythema. Eyes: Pupils are equal, round, and reactive to light. Conjunctivae and EOM are normal.   Neck: Normal range of motion. Neck supple. No JVD present. Cardiovascular: Regular rhythm and intact distal pulses. Tachycardia present. Exam reveals no gallop and no friction rub. No murmur heard. Pulmonary/Chest: Effort normal. No stridor. No respiratory distress. She has no wheezes. Good aeration with few scattered rhonchi   Abdominal: Soft. Bowel sounds are normal. She exhibits no distension and no mass. There is no tenderness. There is no rigidity, no rebound and no guarding. Musculoskeletal: Normal range of motion. She exhibits no edema, tenderness or deformity. Neurological: She is alert and oriented to person, place, and time. She has normal strength. No cranial nerve deficit or sensory deficit. She exhibits normal muscle tone. Coordination normal. GCS eye subscore is 4. GCS verbal subscore is 5. GCS motor subscore is 6. Skin: Skin is warm and dry. Capillary refill takes less than 2 seconds. No rash noted. She is not diaphoretic.        Procedures    MDM         Labs  Results for orders placed or performed during the hospital encounter of 05/25/19   CBC Auto Differential   Result Value Ref Range    WBC 17.0 (H) 4.0 - 11.0 K/uL    RBC 5.18 4.00 - 5.20 M/uL    Hemoglobin 14.6 12.0 - 16.0 g/dL    Hematocrit 45.0 36.0 - 48.0 %    MCV 86.8 80.0 - 100.0 fL    MCH 28.2 26.0 - 34.0 pg    MCHC 32.5 31.0 - 36.0 g/dL    RDW 15.9 (H) 12.4 - 15.4 %    Platelets 291 815 - 368 K/uL    MPV 8.0 5.0 - 10.5 fL    Neutrophils % 83.7 %    Lymphocytes % 8.7 %    Monocytes % 7.2 %    Eosinophils % 0.0 %    Basophils % 0.4 %    Neutrophils # 14.3 (H) 1.7 - 7.7 K/uL    Lymphocytes # 1.5 1.0 - 5.1 K/uL    Monocytes # 1.2 0.0 - 1.3 K/uL    Eosinophils # 0.0 0.0 - 0.6 K/uL    Basophils # 0.1 0.0 - 0.2 K/uL   Comprehensive Metabolic Panel   Result Value Ref Range    Sodium 134 (L) 136 - 145 mmol/L    Potassium 3.3 (L) 3.5 - 5.1 mmol/L    Chloride 100 99 - 110 mmol/L    CO2 21 21 - 32 mmol/L    Anion Gap 13 3 - 16    Glucose 170 (H) 70 - 99 mg/dL    BUN 8 7 - 20 mg/dL    CREATININE 0.6 0.6 - 1.1 mg/dL    GFR Non-African American >60 >60    GFR African American >60 >60    Calcium 9.2 8.3 - 10.6 mg/dL    Total Protein 8.3 (H) 6.4 - 8.2 g/dL    Alb 3.5 3.4 - 5.0 g/dL    Albumin/Globulin Ratio 0.7 (L) 1.1 - 2.2    Total Bilirubin 0.6 0.0 - 1.0 mg/dL    Alkaline Phosphatase 152 (H) 40 - 129 U/L     (H) 10 - 40 U/L    AST 86 (H) 15 - 37 U/L    Globulin 4.8 g/dL   Troponin   Result Value Ref Range    Troponin <0.01 <0.01 ng/mL   Ethanol   Result Value Ref Range    Ethanol Lvl None Detected mg/dL   Drug screen multi urine   Result Value Ref Range    Amphetamine Screen, Urine POSITIVE (A) Negative <1000ng/mL    Barbiturate Screen, Ur Neg Negative <200 ng/mL    Benzodiazepine Screen, Urine Neg Negative <200 ng/mL    Cannabinoid Scrn, Ur POSITIVE (A) Negative <50 ng/mL    Cocaine Metabolite Screen, Urine Neg Negative <300 ng/mL    Opiate Scrn, Ur POSITIVE (A) Negative <300 ng/mL    PCP Screen, Urine Neg Negative <25 ng/mL    Methadone Screen, Urine Neg Negative <300 ng/mL    Propoxyphene Scrn, Ur Neg Negative <300 ng/mL    pH, UA 5.0     Drug Screen Comment: see below     Oxycodone Urine Neg Negative <100 ng/ml   Acetaminophen level   Result Value Ref Range    Acetaminophen Level <5 (L) 10 - 30 ug/mL   Salicylate   Result Value Ref Range    Salicylate, Serum <8.8 (L) 15.0 - 30.0 mg/dL   Drug screen, tricyclic   Result Value Ref Range    Tricyclic Neg Negative <8173 ng/mL   Blood gas, venous   Result Value Ref Range    pH, Dante 7.404 7.350 - 7.450    pCO2, Dante 36.8 (L) 40.0 - 50.0 mmHg    pO2, Dante 41.9 (H) 25.0 - 40.0 mmHg    HCO3, Venous 22.5 (L) 23.0 - 29.0 mmol/L    Base Excess, Dante -1.7 -3.0 - 3.0 mmol/L    O2 Sat, Dante 78 Not Established %    Carboxyhemoglobin 3.7 (H) 0.0 - 1.5 %    MetHgb, Dante 0.1 <1.5 %    TC02 (Calc), Dante 24 Not Established mmol/L    O2 Content, Dante 16 Not Established VOL %    O2 Therapy Unknown    CK   Result Value Ref Range    Total  26 - 192 U/L   EKG 12 Lead   Result Value Ref Range    Ventricular Rate 88 BPM    Atrial Rate 88 BPM    P-R Interval 134 ms    QRS Duration 78 ms    Q-T Interval 376 ms    QTc Calculation (Bazett) 454 ms    P Axis 77 degrees    R Axis 70 degrees    T Axis 65 degrees    Diagnosis       Normal sinus rhythmNonspecific ST abnormalityPossible Left atrial enlargementBorderline ECGNo previous ECGs availableConfirmed by [facility] COLTON WYNNE MD ([id number]) on 5/25/2019 12:37:49 PM Radiology  X-ray interpretation by radiologist reviewed (Final interpretation by radiologist to follow): The following radiographic studies: Radiologist's interpretation:    Ct Head Wo Contrast    Result Date: 5/25/2019  EXAMINATION: CT OF THE HEAD WITHOUT CONTRAST  5/25/2019 10:40 am TECHNIQUE: CT of the head was performed without the administration of intravenous contrast. Dose modulation, iterative reconstruction, and/or weight based adjustment of the mA/kV was utilized to reduce the radiation dose to as low as reasonably achievable. COMPARISON: 11/29/2016 HISTORY: ORDERING SYSTEM PROVIDED HISTORY: SEIZURE, NEW, >41YO, NO TRAUMA TECHNOLOGIST PROVIDED HISTORY: Has a \"code stroke\" or \"stroke alert\" been called? ->No Ordering Physician Provided Reason for Exam: meth overdose. highly combative. Acuity: Acute Type of Exam: Initial FINDINGS: BRAIN/VENTRICLES: There is at least moderate motion degradation, especially inferiorly within the middle cranial fossa and the posterior fossa. That said, no obvious acute loss of the gray-white matter differentiation is identified to suggest acute or subacute infarct. No masses or hemorrhages within the brain parenchyma are found. The ventricles are midline. No significant periventricular white matter disease. Intracranial vasculature is not well evaluated but grossly unremarkable. ORBITS: No orbital abnormalities identified. SINUSES: The visualized paranasal sinuses and mastoid air cells demonstrate no acute abnormality. SOFT TISSUES/SKULL:  No acute abnormality of the visualized skull or soft tissues. No acute intracranial abnormality. At least moderate motion degradation, especially inferiorly within the middle cranial fossa and posterior fossa.      Xr Chest Portable    Result Date: 5/25/2019  EXAMINATION: ONE XRAY VIEW OF THE CHEST 5/25/2019 12:33 pm COMPARISON: August 21, 2006 HISTORY: ORDERING SYSTEM PROVIDED HISTORY: od TECHNOLOGIST PROVIDED HISTORY: Reason for exam:->od Ordering Physician Provided Reason for Exam: meth overdose. patient highly combative. best images possible. Acuity: Acute Type of Exam: Initial FINDINGS: The cardiomediastinal silhouette is normal in size. The lungs are clear. No pleural effusion or pneumothorax is present. No acute cardiopulmonary process. Examination is limited by difficulties with positioning. EKG Interpretation. EKG interpreted by Katherin Martell MD:    Rhythm: normal sinus   Rate: 88  Axis: normal  Ectopy: none  Conduction: normal  ST Segments: no acute change  T Waves: no acute change  Q Waves: none      Emergency Department Course:    12:30 PM  Called to patient room for seizure activity. She was given IV Ativan. Seizure was brief. Patient was combative following the seizure. 2:46 PM  Spoke to Dr Reg Mixon and she declines admission to [facility]. Pt will be transferred to [facility].    2:59PM  Spoke with Dr. Louise Crane at [facility], and discussed symptoms, exam, objective data and clinical course. Disposition and plan agreed upon. He will accept the patient in transfer. 3:42 PM  Re-evalated patient. She is now awake and alert. We discussed her course here in the emergency department and patient is agreeable to admission. Patient stated that she didn't initially did not wish to be admitted. I did feel the patient would benefit from admission with history of seizure and her amphetamine and opiate dose. Patient discussion was agreeable to admission was transferred to [facility] for evaluation and treatment. She has awake and alert at this time. The Clinical Impression is multidrug overdose, seizure    This document serves as a record of the services and decisions personally performed by myself, Katherin Martell MD. It was created on my behalf by Yadi Ga, 5/25/19   a trained medical scribe. The creation of this document is based on my statements to the medical scribe.   This dictation was generated by voice recognition computer software. Although all attempts are made to edit the dictation for accuracy, there may be errors in the transcription that are not intended.          Chandler Rosenthal MD  05/25/19 6498

## 2019-05-25 NOTE — ED NOTES
To ct with pt.  Frequent moving of head during test. Tape used to secure head     Staci Chen RN  05/25/19 4337

## 2019-05-25 NOTE — PROGRESS NOTES
0'\"   5/25/19 5:33 PM   [address] or Facility: [facility] From: Jeanette Tena RE: ervin darnell 1960 RM: 446 PT IS HERE Need Callback: NO CALLBACK REQ C4 PROGRESSIVE CARE NEW ADMISSION  Unread

## 2019-05-25 NOTE — ED NOTES
Pt had seizure lasted 50 seconds. Multiple staff in room. md in room. nrb placed.  sats remained 95%     Rashid Alicea RN  05/25/19 1257

## 2019-05-26 LAB
ANION GAP SERPL CALCULATED.3IONS-SCNC: 7 MMOL/L (ref 3–16)
BASOPHILS ABSOLUTE: 0.1 K/UL (ref 0–0.2)
BASOPHILS RELATIVE PERCENT: 0.6 %
BILIRUBIN URINE: NEGATIVE
BLOOD, URINE: NEGATIVE
BUN BLDV-MCNC: 6 MG/DL (ref 7–20)
CALCIUM SERPL-MCNC: 8.2 MG/DL (ref 8.3–10.6)
CHLORIDE BLD-SCNC: 106 MMOL/L (ref 99–110)
CLARITY: CLEAR
CO2: 22 MMOL/L (ref 21–32)
COLOR: YELLOW
CREAT SERPL-MCNC: <0.5 MG/DL (ref 0.6–1.1)
EOSINOPHILS ABSOLUTE: 0.1 K/UL (ref 0–0.6)
EOSINOPHILS RELATIVE PERCENT: 1 %
GFR AFRICAN AMERICAN: >60
GFR NON-AFRICAN AMERICAN: >60
GLUCOSE BLD-MCNC: 155 MG/DL (ref 70–99)
GLUCOSE URINE: NEGATIVE MG/DL
HCT VFR BLD CALC: 42.7 % (ref 36–48)
HEMOGLOBIN: 14.4 G/DL (ref 12–16)
KETONES, URINE: NEGATIVE MG/DL
LACTIC ACID: 0.9 MMOL/L (ref 0.4–2)
LACTIC ACID: 1.3 MMOL/L (ref 0.4–2)
LACTIC ACID: 1.8 MMOL/L (ref 0.4–2)
LEUKOCYTE ESTERASE, URINE: NEGATIVE
LYMPHOCYTES ABSOLUTE: 3.3 K/UL (ref 1–5.1)
LYMPHOCYTES RELATIVE PERCENT: 25.5 %
MCH RBC QN AUTO: 29 PG (ref 26–34)
MCHC RBC AUTO-ENTMCNC: 33.7 G/DL (ref 31–36)
MCV RBC AUTO: 86.1 FL (ref 80–100)
MICROSCOPIC EXAMINATION: NORMAL
MONOCYTES ABSOLUTE: 1 K/UL (ref 0–1.3)
MONOCYTES RELATIVE PERCENT: 7.5 %
NEUTROPHILS ABSOLUTE: 8.5 K/UL (ref 1.7–7.7)
NEUTROPHILS RELATIVE PERCENT: 65.4 %
NITRITE, URINE: NEGATIVE
PDW BLD-RTO: 15.8 % (ref 12.4–15.4)
PH UA: 7 (ref 5–8)
PLATELET # BLD: 272 K/UL (ref 135–450)
PMV BLD AUTO: 7.9 FL (ref 5–10.5)
POTASSIUM SERPL-SCNC: 4 MMOL/L (ref 3.5–5.1)
PROTEIN UA: NEGATIVE MG/DL
RBC # BLD: 4.96 M/UL (ref 4–5.2)
SODIUM BLD-SCNC: 135 MMOL/L (ref 136–145)
SPECIFIC GRAVITY UA: <=1.005 (ref 1–1.03)
URINE TYPE: NORMAL
UROBILINOGEN, URINE: 1 E.U./DL
WBC # BLD: 13 K/UL (ref 4–11)

## 2019-05-26 PROCEDURE — 36415 COLL VENOUS BLD VENIPUNCTURE: CPT

## 2019-05-26 PROCEDURE — 85025 COMPLETE CBC W/AUTO DIFF WBC: CPT

## 2019-05-26 PROCEDURE — 2580000003 HC RX 258: Performed by: HOSPITALIST

## 2019-05-26 PROCEDURE — 6370000000 HC RX 637 (ALT 250 FOR IP): Performed by: INTERNAL MEDICINE

## 2019-05-26 PROCEDURE — 83605 ASSAY OF LACTIC ACID: CPT

## 2019-05-26 PROCEDURE — 81003 URINALYSIS AUTO W/O SCOPE: CPT

## 2019-05-26 PROCEDURE — 6370000000 HC RX 637 (ALT 250 FOR IP): Performed by: NURSE PRACTITIONER

## 2019-05-26 PROCEDURE — 6360000002 HC RX W HCPCS: Performed by: HOSPITALIST

## 2019-05-26 PROCEDURE — 80048 BASIC METABOLIC PNL TOTAL CA: CPT

## 2019-05-26 PROCEDURE — 2060000000 HC ICU INTERMEDIATE R&B

## 2019-05-26 RX ORDER — IBUPROFEN 400 MG/1
400 TABLET ORAL EVERY 6 HOURS PRN
Status: DISCONTINUED | OUTPATIENT
Start: 2019-05-26 | End: 2019-05-27 | Stop reason: HOSPADM

## 2019-05-26 RX ORDER — IBUPROFEN 400 MG/1
800 TABLET ORAL ONCE
Status: COMPLETED | OUTPATIENT
Start: 2019-05-26 | End: 2019-05-26

## 2019-05-26 RX ADMIN — IBUPROFEN 800 MG: 400 TABLET, FILM COATED ORAL at 06:48

## 2019-05-26 RX ADMIN — GABAPENTIN 400 MG: 400 CAPSULE ORAL at 09:52

## 2019-05-26 RX ADMIN — GABAPENTIN 400 MG: 400 CAPSULE ORAL at 20:30

## 2019-05-26 RX ADMIN — LEVETIRACETAM 1000 MG: 100 INJECTION, SOLUTION INTRAVENOUS at 06:49

## 2019-05-26 RX ADMIN — SODIUM CHLORIDE, PRESERVATIVE FREE 10 ML: 5 INJECTION INTRAVENOUS at 09:52

## 2019-05-26 ASSESSMENT — PAIN SCALES - GENERAL: PAINLEVEL_OUTOF10: 10

## 2019-05-26 NOTE — PLAN OF CARE
Pt high fall risk. Instructed to use call light before getting out of bed. Call light within reach. Bed in low position. Bed alarm on. Will continue to monitor.

## 2019-05-26 NOTE — PROGRESS NOTES
non-distended with normal bowel sounds. Musculoskeletal:  No clubbing, cyanosis or edema bilaterally. Full range of motion without deformity. Skin: Skin color, texture, turgor normal.  No rashes or lesions. Neurologic:  Neurovascularly intact without any focal sensory/motor deficits. Psychiatric:  Alert and oriented to 0 at present, no agitation currently  Capillary Refill: Brisk,< 3 seconds   Peripheral Pulses: +2 palpable, equal bilaterally      Labs:   Recent Labs     05/25/19  1305 05/25/19  1812 05/26/19  0443   WBC 17.0* 16.1* 13.0*   HGB 14.6 14.5 14.4   HCT 45.0 44.1 42.7    263 272     Recent Labs     05/25/19  1305 05/25/19  1812 05/26/19  0443   * 136 135*   K 3.3* 3.4* 4.0    104 106   CO2 21 22 22   BUN 8 5* 6*   CREATININE 0.6 <0.5* <0.5*   CALCIUM 9.2 8.9 8.2*     Recent Labs     05/25/19  1305 05/25/19  1812   AST 86* 67*   * 92*   BILIDIR  --  <0.2   BILITOT 0.6 0.7   ALKPHOS 152* 132*     Recent Labs     05/25/19  1305   CKTOTAL 154   TROPONINI <0.01     Urinalysis:    Lab Results   Component Value Date    NITRU Negative 05/26/2019    BLOODU Negative 05/26/2019    SPECGRAV <=1.005 05/26/2019    GLUCOSEU Negative 05/26/2019     Assessment/Plan:    Active Hospital Problems    Diagnosis Date Noted    Acute encephalopathy [G93.40] 05/25/2019     ASSESSMENT /Plan :   1. Drug Overdose, UDS positive for  Amphetamine ; THC and Opiate  - Counseled about abstinence ; Watch for Withdrawals     2. Breakthrough Seizure with Hx of Epilepsy  - suspect drug use exacerbated this  - continue seizure precautions, Ativan when necessary, added IV Keppra 1 g twice a day-continue; follow EEG and neuro consultation  - Patient reports taking only Neurontin . Not on any first line AED     3. Acute encephalopathy, prolonged post ictal (POA) - Improving ; .  No signs meningitis -CT head Negative . Despite improvement with improvement of underlying condition    4.  SIRS, no clear source infection - improving with IV hydration; follow blood cultures. Monitor leukocytosis off antibiotics    5. Hypokalemia - Replete K. MIVF      DVT Prophylaxis: Lovenox   Diet: DIET GENERAL;  Code Status: Full Code    PT/OT Eval Status: Not yet ordered    Dispo - anticipate more than 2 midnights stay in the hospital       The note was completed using Dragon -speech recognition software & EMR  . Every effort was made to ensure accuracy; however, inadvertent computerized transcription errors may be present.     Mari Steinberg MD

## 2019-05-26 NOTE — PROGRESS NOTES
Patient complaing of back pain 10/10, Dr Lesia Lara informed and this writer received telephone order of one time dose Motrin 800mg.

## 2019-05-26 NOTE — PROGRESS NOTES
Patient has managed to get out of bed x3 tonight to go to the bathroom despite having a bed alarm and avasys camera in place. She is unsteady on her feet and has been informed numerous times to use her nurse call bell. Patient is very quick on her feet and ignores directions. Will continue to monitor closely.

## 2019-05-26 NOTE — CONSULTS
In patient Neurology consult        [facility]      MD Gus Persaud Sylviacresencio  1960    Date of Service: 5/26/2019    Referring Physician: Jamel Olivares MD      Reason for the consult and CC: acute encephalopathy and possible recurrent seizure. History was obtained from chart reviewing and discussion with the patient. The patient is not a good historian. HPI:   The patient is a 66y.o.  years old female with hx of ETOh abuse, drug abuse and \" bipolar seizures\" who was admitted to [facility] yesterday with acute encephalopathy and possible breakthrough seizure. She has no recollection of the whole event. She was observed by bystanders to be somewhat screaming and confused while driving her car. 911 was called in and she was transferred to [facility] [place] ED. She was observed to have generalized tonic clonic seizure in the ED for few minutes. No aura. Degree was severe. She had some few minutes post ictal state. No tongue biting or bladder incontinence. She received benzodiazepine in the ED and symptoms improved. She was admitted to [facility] for further workup. Today she feels back to her baseline. She denies any numbness or  tingling, headache, chest pain, fever or chills. She describes history of seizure disorder. Frequency of the seizures is once every 2 weeks. She described her seizures as generalized motor seizure. No aura or other associated symptoms. Initial blood test revealed hyponatremia and mild leukocytosis. UDS was positive for amphetamine. CT head showed no acute findings. She was started on Keppra in the ED and she is currently on 1 gm twice daily. Other review of system was unremarkable. Past Medical History:   Diagnosis Date    Back pain     Hepatitis C antibody positive in blood 11/07/2018    Seizures ([address]      History reviewed. No pertinent family history.   Past Surgical History:   Procedure Laterality Date    APPENDECTOMY      CARPAL TUNNEL RELEASE       SECTION      ROTATOR CUFF REPAIR Right     SKIN GRAFT      TUBAL LIGATION       Past Surgical History:   Procedure Laterality Date    APPENDECTOMY      CARPAL TUNNEL RELEASE       SECTION      ROTATOR CUFF REPAIR Right     SKIN GRAFT      TUBAL LIGATION       History reviewed. No pertinent family history.   Social History     Tobacco Use    Smoking status: Current Every Day Smoker     Packs/day: 1.00    Smokeless tobacco: Never Used   Substance Use Topics    Alcohol use: No    Drug use: Yes     Types: Methamphetamines     Allergies   Allergen Reactions    Flexeril [Cyclobenzaprine]      Causes legs to be restless    Ultram [Tramadol Hcl]     Robaxin [Methocarbamol] Nausea And Vomiting     Current Facility-Administered Medications   Medication Dose Route Frequency Provider Last Rate Last Dose    ibuprofen (ADVIL;MOTRIN) tablet 400 mg  400 mg Oral Q6H PRN Sharon Peralta MD        sodium chloride flush 0.9 % injection 10 mL  10 mL Intravenous 2 times per day Shmuel Quinones MD   10 mL at 19 0692    sodium chloride flush 0.9 % injection 10 mL  10 mL Intravenous PRN Shmuel Quinones MD        magnesium hydroxide (MILK OF MAGNESIA) 400 MG/5ML suspension 30 mL  30 mL Oral Daily PRN Shmuel Quinones MD        ondansetron [facility]) injection 4 mg  4 mg Intravenous Q6H PRN Shmuel Quinones MD        enoxaparin (LOVENOX) injection 40 mg  40 mg Subcutaneous Daily Shmuel Quinones MD        haloperidol lactate (HALDOL) injection 5 mg  5 mg Intravenous Q4H PRN Shmuel Quinones MD        levETIRAcetam (KEPPRA) 1,000 mg in sodium chloride 0.9 % 100 mL IVPB  1,000 mg Intravenous Q12H Shmuel Quinones MD   Stopped at 19 0710    0.9 % sodium chloride infusion   Intravenous Continuous Shmuel Quinones MD   Stopped at 19 2312    gabapentin (NEURONTIN) capsule 400 mg  400 mg Oral BID Zita Cevallos, APRN - CNP 400 mg at 05/26/19 0952       ROS : A 10-12 system review of constitutional, cardiovascular, respiratory, musculoskeletal, endocrine, skin, hematological, SHEENT, genitourinary, psychiatric and neurologic systems was obtained and updated today and is unremarkable except as mentioned in my HPI      Exam:     Constitutional:   Vitals:    05/26/19 0438 05/26/19 0550 05/26/19 0745 05/26/19 1258   BP: 95/60  107/67 116/72   Pulse: 92  93 81   Resp: 18  20 20   Temp: 98.5 °F (36.9 °C)  98.2 °F (36.8 °C) 98.1 °F (36.7 °C)   TempSrc: Oral  Oral Oral   SpO2: 91%  92% 93%   Weight:  124 lb 9 oz (56.5 kg)     Height:           General appearance:  Normal development and appear in no acute distress. Eye: No icterus. Fundus: No blurring of optic disc. Neck: supple  Cardiovascular:          No lower leg edema with good pulsation. Mental Status:   Oriented to person, place, problem, and time. Memory: Good immediate recall. Intact remote memory  Poor  attention span and concentration. Language: intact naming, repeating and fluency   Poor fund of Knowledge. Cranial Nerves:   II: Visual fields: Full to confrontation and nl [facility]. Pupils: equal, round, reactive to light  III,IV,VI: Extra Ocular Movements are intact. No nystagmus  V: Facial sensation is intact to pin prick and light touch  VII: Facial strength and movements: intact and symmetric  VIII: Hearing: Intact to finger rub bilaterally  IX: Palate elevation is symmetric  XI: Shoulder shrug is intact  XII: Tongue movements are normal  Musculoskeletal: 5/5 in all 4 extremities. Tone: Normal tone. Reflexes: Bilateral biceps 2/4, triceps 2/4, brachial radialis 2/4, knee 2/4 and ankle 2/4. Planters: flexor bilaterally. Coordination: no pronator drift, no dysmetria with FNF in upper extremities. Normal REM. Sensation: normal to all modalities in both arms and legs. Gait/Posture: NT due to poor cooperation.      Data:  LABS:   Lab Results   Component Value Date

## 2019-05-27 VITALS
RESPIRATION RATE: 18 BRPM | TEMPERATURE: 98.6 F | OXYGEN SATURATION: 93 % | WEIGHT: 124.56 LBS | SYSTOLIC BLOOD PRESSURE: 96 MMHG | BODY MASS INDEX: 20.02 KG/M2 | DIASTOLIC BLOOD PRESSURE: 61 MMHG | HEART RATE: 76 BPM | HEIGHT: 66 IN

## 2019-05-27 LAB
ORGANISM: ABNORMAL
URINE CULTURE, ROUTINE: ABNORMAL
URINE CULTURE, ROUTINE: ABNORMAL

## 2019-05-27 PROCEDURE — 6370000000 HC RX 637 (ALT 250 FOR IP): Performed by: INTERNAL MEDICINE

## 2019-05-27 PROCEDURE — 6370000000 HC RX 637 (ALT 250 FOR IP): Performed by: NURSE PRACTITIONER

## 2019-05-27 RX ORDER — ACETAMINOPHEN 325 MG/1
650 TABLET ORAL EVERY 4 HOURS PRN
Status: DISCONTINUED | OUTPATIENT
Start: 2019-05-27 | End: 2019-05-27 | Stop reason: HOSPADM

## 2019-05-27 RX ADMIN — IBUPROFEN 400 MG: 400 TABLET, FILM COATED ORAL at 05:33

## 2019-05-27 RX ADMIN — GABAPENTIN 400 MG: 400 CAPSULE ORAL at 08:52

## 2019-05-27 ASSESSMENT — PAIN SCALES - GENERAL
PAINLEVEL_OUTOF10: 10
PAINLEVEL_OUTOF10: 10

## 2019-05-27 NOTE — PROGRESS NOTES
This writer went to start patients IV Keppra and she had no IV access this writer asked patient what happened and she staed she took IV out as she will be going home tomorrow and does not need it. I explained she cannot just do that and this writer will need to put a new one in as she is on IV Keppra. Patient states she has had seizures for 20 years and will only take a further IV out. Srinivasa then called this writer to tell me she had removed her tele monitor, this writer went to check on patient and she stated it annoys her and she doesn't need it as she is going home tomorrow. Education given to patient why she is on tele and the importance of it. Patient is fully alert and orientated but is non complaint and stated she will just remove it again if I put back on. BRIDGETT Arreola informed of the above and said ok for no IV access and Tele for now. Will continue to monitor.  Electronically signed by Coleman Weiss RN on 5/26/2019

## 2019-05-27 NOTE — PLAN OF CARE
Problem: Pain:  Goal: Pain level will decrease  Outcome: Ongoing     Problem: Mobility - Impaired:  Goal: Able to ambulate independently  Outcome: Ongoing     Problem: Injury - Risk of, Healthcare-Acquired Condition:  Goal: Will remain free from falls  Outcome: Ongoing     Problem: Falls - Risk of:  Goal: Will remain free from falls  Outcome: Ongoing     Pt high fall risk. Instructed to use call light before getting out of bed. Call light within reach. Bed in low position. Bed alarm on. Will continue to monitor.

## 2019-05-30 LAB
BLOOD CULTURE, ROUTINE: NORMAL
CULTURE, BLOOD 2: NORMAL

## 2019-06-06 NOTE — DISCHARGE SUMMARY
Hospital Medicine Discharge Summary    Patient ID: Jackie Mcdonnell      Patient's PCP: Marty Dos Santos PA-C    Admit Date: 5/25/2019     Discharge Date: 5/27/2019     Admitting Physician: Shmuel Quinones MD     Discharge Physician: Sharon Peralta MD     Discharge Diagnoses: Active Hospital Problems    Diagnosis    Acute encephalopathy [G93.40]       The patient was seen and examined on day of discharge and this discharge summary is in conjunction with any daily progress note from day of discharge. HPI taken from admission H&P :    66 y.o. female who presented to [facility] as a transfer from the [facility] ED. Hx obtained from [address] [place] [address] Rd and OSH ED team given pt somnolent at present. Pt was noted by bystanders to be yelling and screaming while driving her car down the road. 911 called and pt brought to [facility] ED. Was very combative req Ativan. Noted to have witnessed tonic clonic seizure, provided additional IV Ativan and sent to [facility] for further care.     Med records suggest pt hx seizure. ED team advised the admitting provider that  pt admitted to IV amphetamine use regularly.     Hospital Course: By Problem List   1. Drug Overdose, UDS positive for  Amphetamine ; THC and Opiate  - Counseled about abstinence ; Planned to Watch for Withdrawals      2. Breakthrough Seizure with Hx of Epilepsy  - suspect drug use exacerbated this  - continue seizure precautions, Ativan when necessary, added IV Keppra 1 g twice a day-continue; planned to follow EEG and neuro consultation  - Patient reports taking only Neurontin . Not on any first line AED      3. Acute encephalopathy, prolonged post ictal (POA) - Improving ; .  No signs meningitis -CT head Negative . Improved  with improvement of underlying condition     4. SIRS, no clear source infection - improving with IV hydration; follow blood cultures. Monitor leukocytosis off antibiotics     5.  Hypokalemia - Repleted K.  MIVF        The patient has

## 2019-08-28 ENCOUNTER — HOSPITAL ENCOUNTER (OUTPATIENT)
Dept: CT IMAGING | Age: 59
Discharge: HOME | End: 2019-08-28
Payer: MEDICAID

## 2019-08-28 DIAGNOSIS — R91.1 SOLITARY PULMONARY NODULE: ICD-10-CM

## 2019-08-28 PROCEDURE — 71250 CT THORAX DX C-: CPT

## 2020-01-01 ENCOUNTER — APPOINTMENT (OUTPATIENT)
Dept: CT IMAGING | Age: 60
End: 2020-01-01
Payer: MEDICAID

## 2020-01-01 ENCOUNTER — APPOINTMENT (OUTPATIENT)
Dept: ULTRASOUND IMAGING | Age: 60
End: 2020-01-01
Payer: MEDICAID

## 2020-01-01 ENCOUNTER — HOSPITAL ENCOUNTER (EMERGENCY)
Age: 60
Discharge: HOME | End: 2020-11-03
Attending: EMERGENCY MEDICINE
Payer: MEDICAID

## 2020-01-01 ENCOUNTER — TELEPHONE (OUTPATIENT)
Dept: PULMONOLOGY | Age: 60
End: 2020-01-01

## 2020-01-01 ENCOUNTER — APPOINTMENT (OUTPATIENT)
Dept: CT IMAGING | Age: 60
DRG: 720 | End: 2020-01-01
Payer: MEDICAID

## 2020-01-01 ENCOUNTER — APPOINTMENT (OUTPATIENT)
Dept: GENERAL RADIOLOGY | Age: 60
DRG: 720 | End: 2020-01-01
Payer: MEDICAID

## 2020-01-01 ENCOUNTER — HOSPITAL ENCOUNTER (INPATIENT)
Age: 60
LOS: 8 days | Discharge: HOME | DRG: 720 | End: 2020-10-23
Attending: EMERGENCY MEDICINE | Admitting: INTERNAL MEDICINE
Payer: MEDICAID

## 2020-01-01 ENCOUNTER — APPOINTMENT (OUTPATIENT)
Dept: ULTRASOUND IMAGING | Age: 60
DRG: 720 | End: 2020-01-01
Payer: MEDICAID

## 2020-01-01 ENCOUNTER — TELEPHONE (OUTPATIENT)
Dept: OTHER | Facility: CLINIC | Age: 60
End: 2020-01-01

## 2020-01-01 ENCOUNTER — VIRTUAL VISIT (OUTPATIENT)
Dept: PULMONOLOGY | Age: 60
End: 2020-01-01
Payer: MEDICAID

## 2020-01-01 VITALS
SYSTOLIC BLOOD PRESSURE: 112 MMHG | TEMPERATURE: 98.5 F | HEART RATE: 81 BPM | HEIGHT: 66 IN | DIASTOLIC BLOOD PRESSURE: 69 MMHG | BODY MASS INDEX: 24.11 KG/M2 | WEIGHT: 150 LBS | RESPIRATION RATE: 19 BRPM | OXYGEN SATURATION: 92 %

## 2020-01-01 VITALS
OXYGEN SATURATION: 92 % | SYSTOLIC BLOOD PRESSURE: 120 MMHG | TEMPERATURE: 98.5 F | WEIGHT: 127.6 LBS | BODY MASS INDEX: 20.51 KG/M2 | HEIGHT: 66 IN | HEART RATE: 92 BPM | DIASTOLIC BLOOD PRESSURE: 62 MMHG | RESPIRATION RATE: 20 BRPM

## 2020-01-01 LAB
A/G RATIO: 0.6 (ref 1.1–2.2)
A/G RATIO: 0.7 (ref 1.1–2.2)
ALBUMIN FLUID: <0.2 G/DL
ALBUMIN SERPL-MCNC: 2.4 G/DL (ref 3.4–5)
ALBUMIN SERPL-MCNC: 2.5 G/DL (ref 3.4–5)
ALBUMIN SERPL-MCNC: 2.5 G/DL (ref 3.4–5)
ALBUMIN SERPL-MCNC: 2.7 G/DL (ref 3.4–5)
ALBUMIN SERPL-MCNC: 2.8 G/DL (ref 3.4–5)
ALBUMIN SERPL-MCNC: 2.8 G/DL (ref 3.4–5)
ALBUMIN SERPL-MCNC: 3.2 G/DL (ref 3.4–5)
ALP BLD-CCNC: 136 U/L (ref 40–129)
ALP BLD-CCNC: 148 U/L (ref 40–129)
ALP BLD-CCNC: 152 U/L (ref 40–129)
ALP BLD-CCNC: 155 U/L (ref 40–129)
ALP BLD-CCNC: 164 U/L (ref 40–129)
ALP BLD-CCNC: 168 U/L (ref 40–129)
ALP BLD-CCNC: 170 U/L (ref 40–129)
ALT SERPL-CCNC: 56 U/L (ref 10–40)
ALT SERPL-CCNC: 58 U/L (ref 10–40)
ALT SERPL-CCNC: 60 U/L (ref 10–40)
ALT SERPL-CCNC: 61 U/L (ref 10–40)
ALT SERPL-CCNC: 64 U/L (ref 10–40)
ALT SERPL-CCNC: 73 U/L (ref 10–40)
ALT SERPL-CCNC: 98 U/L (ref 10–40)
AMMONIA: 41 UMOL/L (ref 11–51)
AMYLASE FLUID: 15 U/L
ANION GAP SERPL CALCULATED.3IONS-SCNC: 3 MMOL/L (ref 3–16)
ANION GAP SERPL CALCULATED.3IONS-SCNC: 5 MMOL/L (ref 3–16)
ANION GAP SERPL CALCULATED.3IONS-SCNC: 5 MMOL/L (ref 3–16)
ANION GAP SERPL CALCULATED.3IONS-SCNC: 6 MMOL/L (ref 3–16)
ANION GAP SERPL CALCULATED.3IONS-SCNC: 6 MMOL/L (ref 3–16)
ANION GAP SERPL CALCULATED.3IONS-SCNC: 7 MMOL/L (ref 3–16)
ANION GAP SERPL CALCULATED.3IONS-SCNC: 7 MMOL/L (ref 3–16)
ANION GAP SERPL CALCULATED.3IONS-SCNC: 8 MMOL/L (ref 3–16)
ANISOCYTOSIS: ABNORMAL
APPEARANCE FLUID: CLEAR
APPEARANCE FLUID: NORMAL
APTT: 30.7 SEC (ref 24.2–36.2)
AST SERPL-CCNC: 100 U/L (ref 15–37)
AST SERPL-CCNC: 162 U/L (ref 15–37)
AST SERPL-CCNC: 61 U/L (ref 15–37)
AST SERPL-CCNC: 66 U/L (ref 15–37)
AST SERPL-CCNC: 69 U/L (ref 15–37)
AST SERPL-CCNC: 72 U/L (ref 15–37)
AST SERPL-CCNC: 96 U/L (ref 15–37)
BANDED NEUTROPHILS RELATIVE PERCENT: 1 % (ref 0–7)
BANDED NEUTROPHILS RELATIVE PERCENT: 1 % (ref 0–7)
BASOPHILS ABSOLUTE: 0 K/UL (ref 0–0.2)
BASOPHILS ABSOLUTE: 0 K/UL (ref 0–0.2)
BASOPHILS ABSOLUTE: 0.1 K/UL (ref 0–0.2)
BASOPHILS RELATIVE PERCENT: 0 %
BASOPHILS RELATIVE PERCENT: 0 %
BASOPHILS RELATIVE PERCENT: 1 %
BILIRUB SERPL-MCNC: 0.4 MG/DL (ref 0–1)
BILIRUB SERPL-MCNC: 0.5 MG/DL (ref 0–1)
BILIRUB SERPL-MCNC: 0.5 MG/DL (ref 0–1)
BILIRUB SERPL-MCNC: 0.7 MG/DL (ref 0–1)
BILIRUB SERPL-MCNC: 0.8 MG/DL (ref 0–1)
BILIRUB SERPL-MCNC: 1 MG/DL (ref 0–1)
BILIRUB SERPL-MCNC: 1.4 MG/DL (ref 0–1)
BLOOD CULTURE, ROUTINE: ABNORMAL
BLOOD CULTURE, ROUTINE: NORMAL
BODY FLUID CULTURE, STERILE: NORMAL
BUN BLDV-MCNC: 12 MG/DL (ref 7–20)
BUN BLDV-MCNC: 13 MG/DL (ref 7–20)
BUN BLDV-MCNC: 14 MG/DL (ref 7–20)
BUN BLDV-MCNC: 16 MG/DL (ref 7–20)
CALCIUM SERPL-MCNC: 8.3 MG/DL (ref 8.3–10.6)
CALCIUM SERPL-MCNC: 8.3 MG/DL (ref 8.3–10.6)
CALCIUM SERPL-MCNC: 8.4 MG/DL (ref 8.3–10.6)
CALCIUM SERPL-MCNC: 8.5 MG/DL (ref 8.3–10.6)
CALCIUM SERPL-MCNC: 8.6 MG/DL (ref 8.3–10.6)
CELL COUNT FLUID TYPE: NORMAL
CELL COUNT FLUID TYPE: NORMAL
CHLORIDE BLD-SCNC: 100 MMOL/L (ref 99–110)
CHLORIDE BLD-SCNC: 101 MMOL/L (ref 99–110)
CHLORIDE BLD-SCNC: 97 MMOL/L (ref 99–110)
CHLORIDE BLD-SCNC: 98 MMOL/L (ref 99–110)
CHLORIDE BLD-SCNC: 98 MMOL/L (ref 99–110)
CHLORIDE BLD-SCNC: 99 MMOL/L (ref 99–110)
CLOT EVALUATION: NORMAL
CLOT EVALUATION: NORMAL
CO2: 24 MMOL/L (ref 21–32)
CO2: 24 MMOL/L (ref 21–32)
CO2: 25 MMOL/L (ref 21–32)
CO2: 26 MMOL/L (ref 21–32)
CO2: 26 MMOL/L (ref 21–32)
CO2: 27 MMOL/L (ref 21–32)
CO2: 29 MMOL/L (ref 21–32)
CO2: 30 MMOL/L (ref 21–32)
COLOR FLUID: COLORLESS
COLOR FLUID: NORMAL
CREAT SERPL-MCNC: <0.5 MG/DL (ref 0.6–1.1)
CULTURE, BLOOD 2: ABNORMAL
CULTURE, BLOOD 2: NORMAL
EKG ATRIAL RATE: 95 BPM
EKG DIAGNOSIS: NORMAL
EKG P AXIS: 67 DEGREES
EKG P-R INTERVAL: 130 MS
EKG Q-T INTERVAL: 360 MS
EKG QRS DURATION: 74 MS
EKG QTC CALCULATION (BAZETT): 452 MS
EKG R AXIS: 52 DEGREES
EKG T AXIS: 32 DEGREES
EKG VENTRICULAR RATE: 95 BPM
EOSINOPHILS ABSOLUTE: 0 K/UL (ref 0–0.6)
EOSINOPHILS ABSOLUTE: 0 K/UL (ref 0–0.6)
EOSINOPHILS ABSOLUTE: 0.1 K/UL (ref 0–0.6)
EOSINOPHILS RELATIVE PERCENT: 0 %
EOSINOPHILS RELATIVE PERCENT: 0 %
EOSINOPHILS RELATIVE PERCENT: 1 %
FLUID PATH CONSULT: NO
FLUID TYPE: NORMAL
FLUID TYPE: NORMAL
GFR AFRICAN AMERICAN: >60
GFR NON-AFRICAN AMERICAN: >60
GLOBULIN: 3.8 G/DL
GLOBULIN: 3.9 G/DL
GLOBULIN: 4.1 G/DL
GLOBULIN: 4.1 G/DL
GLOBULIN: 4.2 G/DL
GLOBULIN: 4.3 G/DL
GLOBULIN: 4.8 G/DL
GLUCOSE BLD-MCNC: 102 MG/DL (ref 70–99)
GLUCOSE BLD-MCNC: 113 MG/DL (ref 70–99)
GLUCOSE BLD-MCNC: 113 MG/DL (ref 70–99)
GLUCOSE BLD-MCNC: 115 MG/DL (ref 70–99)
GLUCOSE BLD-MCNC: 129 MG/DL (ref 70–99)
GLUCOSE BLD-MCNC: 136 MG/DL (ref 70–99)
GLUCOSE BLD-MCNC: 149 MG/DL (ref 70–99)
GLUCOSE BLD-MCNC: 87 MG/DL (ref 70–99)
GLUCOSE BLD-MCNC: 88 MG/DL (ref 70–99)
GLUCOSE BLD-MCNC: 88 MG/DL (ref 70–99)
GRAM STAIN RESULT: NORMAL
GRAM STAIN RESULT: NORMAL
HCT VFR BLD CALC: 33.8 % (ref 36–48)
HCT VFR BLD CALC: 34.8 % (ref 36–48)
HCT VFR BLD CALC: 35 % (ref 36–48)
HCT VFR BLD CALC: 35.1 % (ref 36–48)
HCT VFR BLD CALC: 35.9 % (ref 36–48)
HCT VFR BLD CALC: 36 % (ref 36–48)
HCT VFR BLD CALC: 36 % (ref 36–48)
HCT VFR BLD CALC: 36.3 % (ref 36–48)
HCT VFR BLD CALC: 37.8 % (ref 36–48)
HEMOGLOBIN: 11.3 G/DL (ref 12–16)
HEMOGLOBIN: 11.5 G/DL (ref 12–16)
HEMOGLOBIN: 11.6 G/DL (ref 12–16)
HEMOGLOBIN: 11.6 G/DL (ref 12–16)
HEMOGLOBIN: 11.9 G/DL (ref 12–16)
HEMOGLOBIN: 12 G/DL (ref 12–16)
HEMOGLOBIN: 12.2 G/DL (ref 12–16)
HEMOGLOBIN: 12.3 G/DL (ref 12–16)
HEMOGLOBIN: 12.7 G/DL (ref 12–16)
INR BLD: 1.11 (ref 0.86–1.14)
INR BLD: 1.21 (ref 0.86–1.14)
LACTATE DEHYDROGENASE: 361 U/L (ref 100–190)
LACTIC ACID: 1.5 MMOL/L (ref 0.4–2)
LACTIC ACID: 1.6 MMOL/L (ref 0.4–2)
LD, FLUID: 171 U/L
LV EF: 63 %
LVEF MODALITY: NORMAL
LYMPHOCYTES ABSOLUTE: 0.9 K/UL (ref 1–5.1)
LYMPHOCYTES ABSOLUTE: 1.5 K/UL (ref 1–5.1)
LYMPHOCYTES ABSOLUTE: 1.6 K/UL (ref 1–5.1)
LYMPHOCYTES RELATIVE PERCENT: 10 %
LYMPHOCYTES RELATIVE PERCENT: 19 %
LYMPHOCYTES RELATIVE PERCENT: 7 %
LYMPHOCYTES, BODY FLUID: 15 %
LYMPHOCYTES, BODY FLUID: 6 %
MACROPHAGE FLUID: 30 %
MACROPHAGE FLUID: 43 %
MAGNESIUM: 1.9 MG/DL (ref 1.8–2.4)
MAGNESIUM: 1.9 MG/DL (ref 1.8–2.4)
MCH RBC QN AUTO: 30.3 PG (ref 26–34)
MCH RBC QN AUTO: 30.3 PG (ref 26–34)
MCH RBC QN AUTO: 30.4 PG (ref 26–34)
MCH RBC QN AUTO: 30.5 PG (ref 26–34)
MCH RBC QN AUTO: 30.5 PG (ref 26–34)
MCH RBC QN AUTO: 30.7 PG (ref 26–34)
MCH RBC QN AUTO: 31.6 PG (ref 26–34)
MCHC RBC AUTO-ENTMCNC: 32.9 G/DL (ref 31–36)
MCHC RBC AUTO-ENTMCNC: 33.1 G/DL (ref 31–36)
MCHC RBC AUTO-ENTMCNC: 33.1 G/DL (ref 31–36)
MCHC RBC AUTO-ENTMCNC: 33.4 G/DL (ref 31–36)
MCHC RBC AUTO-ENTMCNC: 33.5 G/DL (ref 31–36)
MCHC RBC AUTO-ENTMCNC: 33.5 G/DL (ref 31–36)
MCHC RBC AUTO-ENTMCNC: 33.7 G/DL (ref 31–36)
MCHC RBC AUTO-ENTMCNC: 33.9 G/DL (ref 31–36)
MCHC RBC AUTO-ENTMCNC: 34 G/DL (ref 31–36)
MCV RBC AUTO: 89.6 FL (ref 80–100)
MCV RBC AUTO: 89.8 FL (ref 80–100)
MCV RBC AUTO: 91 FL (ref 80–100)
MCV RBC AUTO: 91.2 FL (ref 80–100)
MCV RBC AUTO: 91.6 FL (ref 80–100)
MCV RBC AUTO: 91.8 FL (ref 80–100)
MCV RBC AUTO: 92 FL (ref 80–100)
MCV RBC AUTO: 92.7 FL (ref 80–100)
MCV RBC AUTO: 93 FL (ref 80–100)
MESOTHELIAL FLUID: 5 %
MONOCYTES ABSOLUTE: 0.2 K/UL (ref 0–1.3)
MONOCYTES ABSOLUTE: 0.2 K/UL (ref 0–1.3)
MONOCYTES ABSOLUTE: 0.9 K/UL (ref 0–1.3)
MONOCYTES RELATIVE PERCENT: 1 %
MONOCYTES RELATIVE PERCENT: 3 %
MONOCYTES RELATIVE PERCENT: 7 %
NEUTROPHIL, FLUID: 50 %
NEUTROPHIL, FLUID: 51 %
NEUTROPHILS ABSOLUTE: 11.4 K/UL (ref 1.7–7.7)
NEUTROPHILS ABSOLUTE: 14.1 K/UL (ref 1.7–7.7)
NEUTROPHILS ABSOLUTE: 6.2 K/UL (ref 1.7–7.7)
NEUTROPHILS RELATIVE PERCENT: 76 %
NEUTROPHILS RELATIVE PERCENT: 85 %
NEUTROPHILS RELATIVE PERCENT: 88 %
NRBC FLUID: 1 %
NUCLEATED CELLS FLUID: 139 /CUMM
NUCLEATED CELLS FLUID: 178 /CUMM
NUMBER OF CELLS COUNTED FLUID: 100
NUMBER OF CELLS COUNTED FLUID: 100
ORGANISM: ABNORMAL
PDW BLD-RTO: 18 % (ref 12.4–15.4)
PDW BLD-RTO: 18.1 % (ref 12.4–15.4)
PDW BLD-RTO: 18.2 % (ref 12.4–15.4)
PDW BLD-RTO: 18.2 % (ref 12.4–15.4)
PDW BLD-RTO: 18.4 % (ref 12.4–15.4)
PDW BLD-RTO: 18.5 % (ref 12.4–15.4)
PDW BLD-RTO: 18.6 % (ref 12.4–15.4)
PDW BLD-RTO: 18.6 % (ref 12.4–15.4)
PDW BLD-RTO: 18.8 % (ref 12.4–15.4)
PLATELET # BLD: 184 K/UL (ref 135–450)
PLATELET # BLD: 187 K/UL (ref 135–450)
PLATELET # BLD: 201 K/UL (ref 135–450)
PLATELET # BLD: 205 K/UL (ref 135–450)
PLATELET # BLD: 211 K/UL (ref 135–450)
PLATELET # BLD: 225 K/UL (ref 135–450)
PLATELET # BLD: 235 K/UL (ref 135–450)
PLATELET # BLD: 246 K/UL (ref 135–450)
PLATELET # BLD: 272 K/UL (ref 135–450)
PLATELET SLIDE REVIEW: ADEQUATE
PLATELET SLIDE REVIEW: ADEQUATE
PMV BLD AUTO: 7.3 FL (ref 5–10.5)
PMV BLD AUTO: 7.4 FL (ref 5–10.5)
PMV BLD AUTO: 7.5 FL (ref 5–10.5)
PMV BLD AUTO: 7.5 FL (ref 5–10.5)
PMV BLD AUTO: 7.8 FL (ref 5–10.5)
PMV BLD AUTO: 7.9 FL (ref 5–10.5)
PMV BLD AUTO: 7.9 FL (ref 5–10.5)
PMV BLD AUTO: 8 FL (ref 5–10.5)
PMV BLD AUTO: 8.2 FL (ref 5–10.5)
POTASSIUM REFLEX MAGNESIUM: 3.5 MMOL/L (ref 3.5–5.1)
POTASSIUM REFLEX MAGNESIUM: 4 MMOL/L (ref 3.5–5.1)
POTASSIUM REFLEX MAGNESIUM: 4.1 MMOL/L (ref 3.5–5.1)
POTASSIUM REFLEX MAGNESIUM: 4.1 MMOL/L (ref 3.5–5.1)
POTASSIUM REFLEX MAGNESIUM: 4.2 MMOL/L (ref 3.5–5.1)
POTASSIUM REFLEX MAGNESIUM: 4.2 MMOL/L (ref 3.5–5.1)
POTASSIUM REFLEX MAGNESIUM: 4.3 MMOL/L (ref 3.5–5.1)
POTASSIUM REFLEX MAGNESIUM: 4.4 MMOL/L (ref 3.5–5.1)
POTASSIUM REFLEX MAGNESIUM: 4.5 MMOL/L (ref 3.5–5.1)
POTASSIUM SERPL-SCNC: 4 MMOL/L (ref 3.5–5.1)
PRO-BNP: 393 PG/ML (ref 0–124)
PROTEIN FLUID: 0.3 G/DL
PROTEIN FLUID: 1.1 G/DL
PROTHROMBIN TIME: 12.9 SEC (ref 10–13.2)
PROTHROMBIN TIME: 14.1 SEC (ref 10–13.2)
RBC # BLD: 3.72 M/UL (ref 4–5.2)
RBC # BLD: 3.78 M/UL (ref 4–5.2)
RBC # BLD: 3.79 M/UL (ref 4–5.2)
RBC # BLD: 3.82 M/UL (ref 4–5.2)
RBC # BLD: 3.9 M/UL (ref 4–5.2)
RBC # BLD: 3.91 M/UL (ref 4–5.2)
RBC # BLD: 3.93 M/UL (ref 4–5.2)
RBC # BLD: 4.02 M/UL (ref 4–5.2)
RBC # BLD: 4.21 M/UL (ref 4–5.2)
RBC FLUID: 300 /CUMM
RBC FLUID: 600 /CUMM
REPORT: NORMAL
SARS-COV-2, NAAT: NOT DETECTED
SARS-COV-2, PCR: NOT DETECTED
SLIDE REVIEW: ABNORMAL
SLIDE REVIEW: ABNORMAL
SODIUM BLD-SCNC: 126 MMOL/L (ref 136–145)
SODIUM BLD-SCNC: 130 MMOL/L (ref 136–145)
SODIUM BLD-SCNC: 131 MMOL/L (ref 136–145)
SODIUM BLD-SCNC: 133 MMOL/L (ref 136–145)
SODIUM BLD-SCNC: 134 MMOL/L (ref 136–145)
SODIUM BLD-SCNC: 135 MMOL/L (ref 136–145)
TARGET CELLS: ABNORMAL
TOTAL PROTEIN: 6.3 G/DL (ref 6.4–8.2)
TOTAL PROTEIN: 6.6 G/DL (ref 6.4–8.2)
TOTAL PROTEIN: 6.6 G/DL (ref 6.4–8.2)
TOTAL PROTEIN: 6.7 G/DL (ref 6.4–8.2)
TOTAL PROTEIN: 6.9 G/DL (ref 6.4–8.2)
TOTAL PROTEIN: 7 G/DL (ref 6.4–8.2)
TOTAL PROTEIN: 8 G/DL (ref 6.4–8.2)
TROPONIN: <0.01 NG/ML
WBC # BLD: 12.3 K/UL (ref 4–11)
WBC # BLD: 13.2 K/UL (ref 4–11)
WBC # BLD: 15.1 K/UL (ref 4–11)
WBC # BLD: 15.6 K/UL (ref 4–11)
WBC # BLD: 15.8 K/UL (ref 4–11)
WBC # BLD: 16.7 K/UL (ref 4–11)
WBC # BLD: 17.8 K/UL (ref 4–11)
WBC # BLD: 18.3 K/UL (ref 4–11)
WBC # BLD: 8.1 K/UL (ref 4–11)

## 2020-01-01 PROCEDURE — 6360000002 HC RX W HCPCS: Performed by: INTERNAL MEDICINE

## 2020-01-01 PROCEDURE — 74177 CT ABD & PELVIS W/CONTRAST: CPT

## 2020-01-01 PROCEDURE — 49083 ABD PARACENTESIS W/IMAGING: CPT

## 2020-01-01 PROCEDURE — 80053 COMPREHEN METABOLIC PANEL: CPT

## 2020-01-01 PROCEDURE — 94640 AIRWAY INHALATION TREATMENT: CPT

## 2020-01-01 PROCEDURE — 85610 PROTHROMBIN TIME: CPT

## 2020-01-01 PROCEDURE — 6370000000 HC RX 637 (ALT 250 FOR IP): Performed by: INTERNAL MEDICINE

## 2020-01-01 PROCEDURE — 99233 SBSQ HOSP IP/OBS HIGH 50: CPT | Performed by: INTERNAL MEDICINE

## 2020-01-01 PROCEDURE — 1200000000 HC SEMI PRIVATE

## 2020-01-01 PROCEDURE — 32555 ASPIRATE PLEURA W/ IMAGING: CPT

## 2020-01-01 PROCEDURE — 80048 BASIC METABOLIC PNL TOTAL CA: CPT

## 2020-01-01 PROCEDURE — 6360000004 HC RX CONTRAST MEDICATION: Performed by: EMERGENCY MEDICINE

## 2020-01-01 PROCEDURE — 82140 ASSAY OF AMMONIA: CPT

## 2020-01-01 PROCEDURE — U0003 INFECTIOUS AGENT DETECTION BY NUCLEIC ACID (DNA OR RNA); SEVERE ACUTE RESPIRATORY SYNDROME CORONAVIRUS 2 (SARS-COV-2) (CORONAVIRUS DISEASE [COVID-19]), AMPLIFIED PROBE TECHNIQUE, MAKING USE OF HIGH THROUGHPUT TECHNOLOGIES AS DESCRIBED BY CMS-2020-01-R: HCPCS

## 2020-01-01 PROCEDURE — 6370000000 HC RX 637 (ALT 250 FOR IP): Performed by: NURSE PRACTITIONER

## 2020-01-01 PROCEDURE — 94761 N-INVAS EAR/PLS OXIMETRY MLT: CPT

## 2020-01-01 PROCEDURE — 85027 COMPLETE CBC AUTOMATED: CPT

## 2020-01-01 PROCEDURE — U0002 COVID-19 LAB TEST NON-CDC: HCPCS

## 2020-01-01 PROCEDURE — 2580000003 HC RX 258: Performed by: INTERNAL MEDICINE

## 2020-01-01 PROCEDURE — 2700000000 HC OXYGEN THERAPY PER DAY

## 2020-01-01 PROCEDURE — 85025 COMPLETE CBC W/AUTO DIFF WBC: CPT

## 2020-01-01 PROCEDURE — 83880 ASSAY OF NATRIURETIC PEPTIDE: CPT

## 2020-01-01 PROCEDURE — 99232 SBSQ HOSP IP/OBS MODERATE 35: CPT | Performed by: INTERNAL MEDICINE

## 2020-01-01 PROCEDURE — 96374 THER/PROPH/DIAG INJ IV PUSH: CPT

## 2020-01-01 PROCEDURE — 71045 X-RAY EXAM CHEST 1 VIEW: CPT

## 2020-01-01 PROCEDURE — 88112 CYTOPATH CELL ENHANCE TECH: CPT

## 2020-01-01 PROCEDURE — 6360000002 HC RX W HCPCS: Performed by: EMERGENCY MEDICINE

## 2020-01-01 PROCEDURE — 0W993ZZ DRAINAGE OF RIGHT PLEURAL CAVITY, PERCUTANEOUS APPROACH: ICD-10-PCS | Performed by: INTERNAL MEDICINE

## 2020-01-01 PROCEDURE — 99254 IP/OBS CNSLTJ NEW/EST MOD 60: CPT | Performed by: INTERNAL MEDICINE

## 2020-01-01 PROCEDURE — 2580000003 HC RX 258: Performed by: NURSE PRACTITIONER

## 2020-01-01 PROCEDURE — 96365 THER/PROPH/DIAG IV INF INIT: CPT

## 2020-01-01 PROCEDURE — 83615 LACTATE (LD) (LDH) ENZYME: CPT

## 2020-01-01 PROCEDURE — 83605 ASSAY OF LACTIC ACID: CPT

## 2020-01-01 PROCEDURE — 82042 OTHER SOURCE ALBUMIN QUAN EA: CPT

## 2020-01-01 PROCEDURE — 99239 HOSP IP/OBS DSCHRG MGMT >30: CPT | Performed by: INTERNAL MEDICINE

## 2020-01-01 PROCEDURE — 93010 ELECTROCARDIOGRAM REPORT: CPT | Performed by: INTERNAL MEDICINE

## 2020-01-01 PROCEDURE — 0W9G3ZZ DRAINAGE OF PERITONEAL CAVITY, PERCUTANEOUS APPROACH: ICD-10-PCS | Performed by: INTERNAL MEDICINE

## 2020-01-01 PROCEDURE — 99285 EMERGENCY DEPT VISIT HI MDM: CPT

## 2020-01-01 PROCEDURE — 99442 PR PHYS/QHP TELEPHONE EVALUATION 11-20 MIN: CPT | Performed by: INTERNAL MEDICINE

## 2020-01-01 PROCEDURE — 87205 SMEAR GRAM STAIN: CPT

## 2020-01-01 PROCEDURE — 82150 ASSAY OF AMYLASE: CPT

## 2020-01-01 PROCEDURE — 87040 BLOOD CULTURE FOR BACTERIA: CPT

## 2020-01-01 PROCEDURE — 87186 SC STD MICRODIL/AGAR DIL: CPT

## 2020-01-01 PROCEDURE — 84484 ASSAY OF TROPONIN QUANT: CPT

## 2020-01-01 PROCEDURE — 96367 TX/PROPH/DG ADDL SEQ IV INF: CPT

## 2020-01-01 PROCEDURE — 36415 COLL VENOUS BLD VENIPUNCTURE: CPT

## 2020-01-01 PROCEDURE — 88305 TISSUE EXAM BY PATHOLOGIST: CPT

## 2020-01-01 PROCEDURE — 87150 DNA/RNA AMPLIFIED PROBE: CPT

## 2020-01-01 PROCEDURE — 99221 1ST HOSP IP/OBS SF/LOW 40: CPT | Performed by: NURSE PRACTITIONER

## 2020-01-01 PROCEDURE — 6360000002 HC RX W HCPCS: Performed by: NURSE PRACTITIONER

## 2020-01-01 PROCEDURE — 6370000000 HC RX 637 (ALT 250 FOR IP): Performed by: PHYSICIAN ASSISTANT

## 2020-01-01 PROCEDURE — 87070 CULTURE OTHR SPECIMN AEROBIC: CPT

## 2020-01-01 PROCEDURE — 83735 ASSAY OF MAGNESIUM: CPT

## 2020-01-01 PROCEDURE — 99284 EMERGENCY DEPT VISIT MOD MDM: CPT

## 2020-01-01 PROCEDURE — 84157 ASSAY OF PROTEIN OTHER: CPT

## 2020-01-01 PROCEDURE — 85730 THROMBOPLASTIN TIME PARTIAL: CPT

## 2020-01-01 PROCEDURE — 93005 ELECTROCARDIOGRAM TRACING: CPT | Performed by: EMERGENCY MEDICINE

## 2020-01-01 PROCEDURE — 93306 TTE W/DOPPLER COMPLETE: CPT

## 2020-01-01 PROCEDURE — 71260 CT THORAX DX C+: CPT

## 2020-01-01 PROCEDURE — 89051 BODY FLUID CELL COUNT: CPT

## 2020-01-01 PROCEDURE — 6370000000 HC RX 637 (ALT 250 FOR IP): Performed by: EMERGENCY MEDICINE

## 2020-01-01 PROCEDURE — 6360000004 HC RX CONTRAST MEDICATION: Performed by: INTERNAL MEDICINE

## 2020-01-01 PROCEDURE — 2580000003 HC RX 258: Performed by: EMERGENCY MEDICINE

## 2020-01-01 PROCEDURE — 87077 CULTURE AEROBIC IDENTIFY: CPT

## 2020-01-01 RX ORDER — IPRATROPIUM BROMIDE AND ALBUTEROL SULFATE 2.5; .5 MG/3ML; MG/3ML
1 SOLUTION RESPIRATORY (INHALATION)
Status: DISCONTINUED | OUTPATIENT
Start: 2020-01-01 | End: 2020-01-01

## 2020-01-01 RX ORDER — MORPHINE SULFATE 2 MG/ML
2 INJECTION, SOLUTION INTRAMUSCULAR; INTRAVENOUS EVERY 4 HOURS PRN
Status: DISCONTINUED | OUTPATIENT
Start: 2020-01-01 | End: 2020-01-01

## 2020-01-01 RX ORDER — GUAIFENESIN/DEXTROMETHORPHAN 100-10MG/5
5 SYRUP ORAL EVERY 4 HOURS PRN
Status: DISCONTINUED | OUTPATIENT
Start: 2020-01-01 | End: 2020-01-01 | Stop reason: HOSPADM

## 2020-01-01 RX ORDER — ONDANSETRON 2 MG/ML
4 INJECTION INTRAMUSCULAR; INTRAVENOUS EVERY 6 HOURS PRN
Status: DISCONTINUED | OUTPATIENT
Start: 2020-01-01 | End: 2020-01-01 | Stop reason: HOSPADM

## 2020-01-01 RX ORDER — LACTULOSE 10 G/15ML
20 SOLUTION ORAL ONCE
Status: COMPLETED | OUTPATIENT
Start: 2020-01-01 | End: 2020-01-01

## 2020-01-01 RX ORDER — DILTIAZEM HYDROCHLORIDE 60 MG/1
TABLET, FILM COATED ORAL
COMMUNITY
Start: 2020-01-01

## 2020-01-01 RX ORDER — FUROSEMIDE 20 MG/1
20 TABLET ORAL 2 TIMES DAILY
Qty: 60 TABLET | Refills: 0 | Status: ON HOLD | OUTPATIENT
Start: 2020-01-01 | End: 2021-01-01

## 2020-01-01 RX ORDER — SODIUM CHLORIDE 9 MG/ML
INJECTION, SOLUTION INTRAVENOUS CONTINUOUS
Status: DISCONTINUED | OUTPATIENT
Start: 2020-01-01 | End: 2020-01-01

## 2020-01-01 RX ORDER — PREDNISONE 20 MG/1
60 TABLET ORAL ONCE
Status: COMPLETED | OUTPATIENT
Start: 2020-01-01 | End: 2020-01-01

## 2020-01-01 RX ORDER — MORPHINE SULFATE 2 MG/ML
2 INJECTION, SOLUTION INTRAMUSCULAR; INTRAVENOUS ONCE
Status: COMPLETED | OUTPATIENT
Start: 2020-01-01 | End: 2020-01-01

## 2020-01-01 RX ORDER — SODIUM CHLORIDE 0.9 % (FLUSH) 0.9 %
10 SYRINGE (ML) INJECTION EVERY 12 HOURS SCHEDULED
Status: DISCONTINUED | OUTPATIENT
Start: 2020-01-01 | End: 2020-01-01 | Stop reason: HOSPADM

## 2020-01-01 RX ORDER — ALBUTEROL SULFATE 90 UG/1
2 AEROSOL, METERED RESPIRATORY (INHALATION)
Status: DISCONTINUED | OUTPATIENT
Start: 2020-01-01 | End: 2020-01-01

## 2020-01-01 RX ORDER — ALBUTEROL SULFATE 90 UG/1
2 AEROSOL, METERED RESPIRATORY (INHALATION) EVERY 6 HOURS PRN
Status: DISCONTINUED | OUTPATIENT
Start: 2020-01-01 | End: 2020-01-01

## 2020-01-01 RX ORDER — FUROSEMIDE 10 MG/ML
20 INJECTION INTRAMUSCULAR; INTRAVENOUS 2 TIMES DAILY
Status: DISCONTINUED | OUTPATIENT
Start: 2020-01-01 | End: 2020-01-01 | Stop reason: HOSPADM

## 2020-01-01 RX ORDER — FUROSEMIDE 10 MG/ML
20 INJECTION INTRAMUSCULAR; INTRAVENOUS ONCE
Status: COMPLETED | OUTPATIENT
Start: 2020-01-01 | End: 2020-01-01

## 2020-01-01 RX ORDER — IPRATROPIUM BROMIDE AND ALBUTEROL SULFATE 2.5; .5 MG/3ML; MG/3ML
1 SOLUTION RESPIRATORY (INHALATION) 4 TIMES DAILY
Status: DISCONTINUED | OUTPATIENT
Start: 2020-01-01 | End: 2020-01-01 | Stop reason: HOSPADM

## 2020-01-01 RX ORDER — POLYETHYLENE GLYCOL 3350 17 G/17G
17 POWDER, FOR SOLUTION ORAL DAILY PRN
Status: DISCONTINUED | OUTPATIENT
Start: 2020-01-01 | End: 2020-01-01 | Stop reason: HOSPADM

## 2020-01-01 RX ORDER — PREDNISONE 20 MG/1
20 TABLET ORAL
Status: DISCONTINUED | OUTPATIENT
Start: 2020-01-01 | End: 2020-01-01 | Stop reason: HOSPADM

## 2020-01-01 RX ORDER — KETOROLAC TROMETHAMINE 30 MG/ML
30 INJECTION, SOLUTION INTRAMUSCULAR; INTRAVENOUS EVERY 6 HOURS PRN
Status: DISCONTINUED | OUTPATIENT
Start: 2020-01-01 | End: 2020-01-01 | Stop reason: HOSPADM

## 2020-01-01 RX ORDER — ALBUTEROL SULFATE 90 UG/1
2 AEROSOL, METERED RESPIRATORY (INHALATION) 4 TIMES DAILY PRN
Qty: 1 INHALER | Refills: 1 | Status: SHIPPED | OUTPATIENT
Start: 2020-01-01

## 2020-01-01 RX ORDER — IPRATROPIUM BROMIDE AND ALBUTEROL SULFATE 2.5; .5 MG/3ML; MG/3ML
1 SOLUTION RESPIRATORY (INHALATION) ONCE
Status: COMPLETED | OUTPATIENT
Start: 2020-01-01 | End: 2020-01-01

## 2020-01-01 RX ORDER — LORAZEPAM 0.5 MG/1
0.5 TABLET ORAL EVERY 6 HOURS PRN
Status: DISCONTINUED | OUTPATIENT
Start: 2020-01-01 | End: 2020-01-01

## 2020-01-01 RX ORDER — SPIRONOLACTONE 50 MG/1
TABLET, FILM COATED ORAL
COMMUNITY
Start: 2020-01-01

## 2020-01-01 RX ORDER — MORPHINE SULFATE 2 MG/ML
2 INJECTION, SOLUTION INTRAMUSCULAR; INTRAVENOUS EVERY 4 HOURS PRN
Status: DISPENSED | OUTPATIENT
Start: 2020-01-01 | End: 2020-01-01

## 2020-01-01 RX ORDER — IPRATROPIUM BROMIDE AND ALBUTEROL SULFATE 2.5; .5 MG/3ML; MG/3ML
1 SOLUTION RESPIRATORY (INHALATION) EVERY 4 HOURS PRN
Status: DISCONTINUED | OUTPATIENT
Start: 2020-01-01 | End: 2020-01-01 | Stop reason: HOSPADM

## 2020-01-01 RX ORDER — ACETAMINOPHEN 650 MG/1
650 SUPPOSITORY RECTAL EVERY 6 HOURS PRN
Status: DISCONTINUED | OUTPATIENT
Start: 2020-01-01 | End: 2020-01-01 | Stop reason: HOSPADM

## 2020-01-01 RX ORDER — PREDNISONE 10 MG/1
20 TABLET ORAL DAILY
Qty: 10 TABLET | Refills: 0 | Status: SHIPPED | OUTPATIENT
Start: 2020-01-01 | End: 2020-01-01

## 2020-01-01 RX ORDER — IBUPROFEN 600 MG/1
600 TABLET ORAL ONCE
Status: COMPLETED | OUTPATIENT
Start: 2020-01-01 | End: 2020-01-01

## 2020-01-01 RX ORDER — MORPHINE SULFATE 4 MG/ML
4 INJECTION, SOLUTION INTRAMUSCULAR; INTRAVENOUS ONCE
Status: COMPLETED | OUTPATIENT
Start: 2020-01-01 | End: 2020-01-01

## 2020-01-01 RX ORDER — 0.9 % SODIUM CHLORIDE 0.9 %
30 INTRAVENOUS SOLUTION INTRAVENOUS ONCE
Status: COMPLETED | OUTPATIENT
Start: 2020-01-01 | End: 2020-01-01

## 2020-01-01 RX ORDER — OXYCODONE HYDROCHLORIDE AND ACETAMINOPHEN 5; 325 MG/1; MG/1
1 TABLET ORAL EVERY 4 HOURS PRN
Status: DISCONTINUED | OUTPATIENT
Start: 2020-01-01 | End: 2020-01-01 | Stop reason: HOSPADM

## 2020-01-01 RX ORDER — LEVOFLOXACIN 500 MG/1
500 TABLET, FILM COATED ORAL DAILY
Qty: 5 TABLET | Refills: 0 | Status: SHIPPED | OUTPATIENT
Start: 2020-01-01 | End: 2020-01-01

## 2020-01-01 RX ORDER — METHYLPREDNISOLONE SODIUM SUCCINATE 40 MG/ML
40 INJECTION, POWDER, LYOPHILIZED, FOR SOLUTION INTRAMUSCULAR; INTRAVENOUS DAILY
Status: DISCONTINUED | OUTPATIENT
Start: 2020-01-01 | End: 2020-01-01

## 2020-01-01 RX ORDER — ACETAMINOPHEN 325 MG/1
650 TABLET ORAL EVERY 6 HOURS PRN
Status: DISCONTINUED | OUTPATIENT
Start: 2020-01-01 | End: 2020-01-01 | Stop reason: HOSPADM

## 2020-01-01 RX ORDER — SODIUM CHLORIDE 0.9 % (FLUSH) 0.9 %
10 SYRINGE (ML) INJECTION PRN
Status: DISCONTINUED | OUTPATIENT
Start: 2020-01-01 | End: 2020-01-01 | Stop reason: HOSPADM

## 2020-01-01 RX ORDER — PROMETHAZINE HYDROCHLORIDE 25 MG/1
12.5 TABLET ORAL EVERY 6 HOURS PRN
Status: DISCONTINUED | OUTPATIENT
Start: 2020-01-01 | End: 2020-01-01 | Stop reason: HOSPADM

## 2020-01-01 RX ORDER — LORAZEPAM 0.5 MG/1
0.5 TABLET ORAL 2 TIMES DAILY PRN
Status: DISCONTINUED | OUTPATIENT
Start: 2020-01-01 | End: 2020-01-01 | Stop reason: HOSPADM

## 2020-01-01 RX ORDER — MORPHINE SULFATE 4 MG/ML
4 INJECTION, SOLUTION INTRAMUSCULAR; INTRAVENOUS EVERY 30 MIN PRN
Status: DISCONTINUED | OUTPATIENT
Start: 2020-01-01 | End: 2020-01-01

## 2020-01-01 RX ORDER — IPRATROPIUM BROMIDE AND ALBUTEROL SULFATE 2.5; .5 MG/3ML; MG/3ML
SOLUTION RESPIRATORY (INHALATION)
Status: DISCONTINUED
Start: 2020-01-01 | End: 2020-01-01

## 2020-01-01 RX ADMIN — Medication 10 ML: at 10:16

## 2020-01-01 RX ADMIN — MORPHINE SULFATE 2 MG: 2 INJECTION, SOLUTION INTRAMUSCULAR; INTRAVENOUS at 06:32

## 2020-01-01 RX ADMIN — Medication 10 ML: at 08:35

## 2020-01-01 RX ADMIN — METHYLPREDNISOLONE SODIUM SUCCINATE 40 MG: 40 INJECTION, POWDER, FOR SOLUTION INTRAMUSCULAR; INTRAVENOUS at 08:59

## 2020-01-01 RX ADMIN — OXYCODONE HYDROCHLORIDE AND ACETAMINOPHEN 1 TABLET: 5; 325 TABLET ORAL at 16:51

## 2020-01-01 RX ADMIN — MORPHINE SULFATE 4 MG: 4 INJECTION INTRAVENOUS at 06:31

## 2020-01-01 RX ADMIN — IPRATROPIUM BROMIDE AND ALBUTEROL SULFATE 1 AMPULE: .5; 3 SOLUTION RESPIRATORY (INHALATION) at 10:57

## 2020-01-01 RX ADMIN — LORAZEPAM 0.5 MG: 0.5 TABLET ORAL at 08:49

## 2020-01-01 RX ADMIN — SODIUM CHLORIDE: 9 INJECTION, SOLUTION INTRAVENOUS at 19:20

## 2020-01-01 RX ADMIN — LORAZEPAM 0.5 MG: 0.5 TABLET ORAL at 05:59

## 2020-01-01 RX ADMIN — PREDNISONE 30 MG: 20 TABLET ORAL at 08:49

## 2020-01-01 RX ADMIN — IPRATROPIUM BROMIDE AND ALBUTEROL SULFATE 1 AMPULE: .5; 3 SOLUTION RESPIRATORY (INHALATION) at 11:24

## 2020-01-01 RX ADMIN — OXYCODONE HYDROCHLORIDE AND ACETAMINOPHEN 1 TABLET: 5; 325 TABLET ORAL at 14:37

## 2020-01-01 RX ADMIN — IPRATROPIUM BROMIDE AND ALBUTEROL SULFATE 1 AMPULE: .5; 3 SOLUTION RESPIRATORY (INHALATION) at 23:59

## 2020-01-01 RX ADMIN — MAGNESIUM HYDROXIDE 30 ML: 400 SUSPENSION ORAL at 14:44

## 2020-01-01 RX ADMIN — Medication 10 ML: at 21:34

## 2020-01-01 RX ADMIN — FUROSEMIDE 20 MG: 10 INJECTION, SOLUTION INTRAMUSCULAR; INTRAVENOUS at 08:19

## 2020-01-01 RX ADMIN — Medication 2 PUFF: at 01:30

## 2020-01-01 RX ADMIN — ENOXAPARIN SODIUM 40 MG: 40 INJECTION SUBCUTANEOUS at 08:11

## 2020-01-01 RX ADMIN — FUROSEMIDE 20 MG: 10 INJECTION, SOLUTION INTRAMUSCULAR; INTRAVENOUS at 08:34

## 2020-01-01 RX ADMIN — Medication 10 ML: at 21:20

## 2020-01-01 RX ADMIN — MORPHINE SULFATE 2 MG: 2 INJECTION, SOLUTION INTRAMUSCULAR; INTRAVENOUS at 21:20

## 2020-01-01 RX ADMIN — OXYCODONE HYDROCHLORIDE AND ACETAMINOPHEN 1 TABLET: 5; 325 TABLET ORAL at 22:07

## 2020-01-01 RX ADMIN — IPRATROPIUM BROMIDE AND ALBUTEROL SULFATE 1 AMPULE: .5; 3 SOLUTION RESPIRATORY (INHALATION) at 06:49

## 2020-01-01 RX ADMIN — IPRATROPIUM BROMIDE AND ALBUTEROL SULFATE 1 AMPULE: .5; 3 SOLUTION RESPIRATORY (INHALATION) at 05:48

## 2020-01-01 RX ADMIN — Medication 10 ML: at 08:50

## 2020-01-01 RX ADMIN — IPRATROPIUM BROMIDE AND ALBUTEROL SULFATE 1 AMPULE: .5; 3 SOLUTION RESPIRATORY (INHALATION) at 19:03

## 2020-01-01 RX ADMIN — MORPHINE SULFATE 2 MG: 2 INJECTION, SOLUTION INTRAMUSCULAR; INTRAVENOUS at 12:52

## 2020-01-01 RX ADMIN — IPRATROPIUM BROMIDE AND ALBUTEROL SULFATE 1 AMPULE: .5; 3 SOLUTION RESPIRATORY (INHALATION) at 07:12

## 2020-01-01 RX ADMIN — OXYCODONE HYDROCHLORIDE AND ACETAMINOPHEN 1 TABLET: 5; 325 TABLET ORAL at 04:44

## 2020-01-01 RX ADMIN — ENOXAPARIN SODIUM 40 MG: 40 INJECTION SUBCUTANEOUS at 08:33

## 2020-01-01 RX ADMIN — ENOXAPARIN SODIUM 40 MG: 40 INJECTION SUBCUTANEOUS at 08:20

## 2020-01-01 RX ADMIN — SODIUM CHLORIDE: 9 INJECTION, SOLUTION INTRAVENOUS at 16:32

## 2020-01-01 RX ADMIN — OXYCODONE HYDROCHLORIDE AND ACETAMINOPHEN 1 TABLET: 5; 325 TABLET ORAL at 18:10

## 2020-01-01 RX ADMIN — KETOROLAC TROMETHAMINE 30 MG: 30 INJECTION, SOLUTION INTRAMUSCULAR; INTRAVENOUS at 19:35

## 2020-01-01 RX ADMIN — KETOROLAC TROMETHAMINE 30 MG: 30 INJECTION, SOLUTION INTRAMUSCULAR; INTRAVENOUS at 04:45

## 2020-01-01 RX ADMIN — Medication 10 ML: at 20:11

## 2020-01-01 RX ADMIN — ENOXAPARIN SODIUM 40 MG: 40 INJECTION SUBCUTANEOUS at 08:59

## 2020-01-01 RX ADMIN — IPRATROPIUM BROMIDE AND ALBUTEROL SULFATE 1 AMPULE: .5; 3 SOLUTION RESPIRATORY (INHALATION) at 23:33

## 2020-01-01 RX ADMIN — OXYCODONE HYDROCHLORIDE AND ACETAMINOPHEN 1 TABLET: 5; 325 TABLET ORAL at 19:16

## 2020-01-01 RX ADMIN — ACETAMINOPHEN 650 MG: 325 TABLET ORAL at 08:59

## 2020-01-01 RX ADMIN — OXYCODONE HYDROCHLORIDE AND ACETAMINOPHEN 1 TABLET: 5; 325 TABLET ORAL at 01:35

## 2020-01-01 RX ADMIN — METHYLPREDNISOLONE SODIUM SUCCINATE 40 MG: 40 INJECTION, POWDER, FOR SOLUTION INTRAMUSCULAR; INTRAVENOUS at 05:41

## 2020-01-01 RX ADMIN — OXYCODONE HYDROCHLORIDE AND ACETAMINOPHEN 1 TABLET: 5; 325 TABLET ORAL at 13:22

## 2020-01-01 RX ADMIN — IPRATROPIUM BROMIDE AND ALBUTEROL SULFATE 1 AMPULE: .5; 3 SOLUTION RESPIRATORY (INHALATION) at 11:28

## 2020-01-01 RX ADMIN — FUROSEMIDE 20 MG: 10 INJECTION, SOLUTION INTRAMUSCULAR; INTRAVENOUS at 10:44

## 2020-01-01 RX ADMIN — IPRATROPIUM BROMIDE AND ALBUTEROL SULFATE 1 AMPULE: .5; 3 SOLUTION RESPIRATORY (INHALATION) at 09:04

## 2020-01-01 RX ADMIN — DEXTROSE MONOHYDRATE 500 MG: 50 INJECTION, SOLUTION INTRAVENOUS at 15:53

## 2020-01-01 RX ADMIN — DEXTROSE MONOHYDRATE 500 MG: 50 INJECTION, SOLUTION INTRAVENOUS at 15:02

## 2020-01-01 RX ADMIN — CEFTRIAXONE 2 G: 2 INJECTION, POWDER, FOR SOLUTION INTRAMUSCULAR; INTRAVENOUS at 14:37

## 2020-01-01 RX ADMIN — OXYCODONE HYDROCHLORIDE AND ACETAMINOPHEN 1 TABLET: 5; 325 TABLET ORAL at 05:41

## 2020-01-01 RX ADMIN — METHYLPREDNISOLONE SODIUM SUCCINATE 40 MG: 40 INJECTION, POWDER, FOR SOLUTION INTRAMUSCULAR; INTRAVENOUS at 06:08

## 2020-01-01 RX ADMIN — DEXTROSE MONOHYDRATE 500 MG: 50 INJECTION, SOLUTION INTRAVENOUS at 16:45

## 2020-01-01 RX ADMIN — CEFTRIAXONE SODIUM 1 G: 1 INJECTION, POWDER, FOR SOLUTION INTRAMUSCULAR; INTRAVENOUS at 14:50

## 2020-01-01 RX ADMIN — LORAZEPAM 0.5 MG: 0.5 TABLET ORAL at 16:31

## 2020-01-01 RX ADMIN — Medication 10 ML: at 22:19

## 2020-01-01 RX ADMIN — ENOXAPARIN SODIUM 40 MG: 40 INJECTION SUBCUTANEOUS at 08:43

## 2020-01-01 RX ADMIN — OXYCODONE HYDROCHLORIDE AND ACETAMINOPHEN 1 TABLET: 5; 325 TABLET ORAL at 14:18

## 2020-01-01 RX ADMIN — SODIUM CHLORIDE: 9 INJECTION, SOLUTION INTRAVENOUS at 01:35

## 2020-01-01 RX ADMIN — Medication 10 ML: at 02:31

## 2020-01-01 RX ADMIN — IPRATROPIUM BROMIDE AND ALBUTEROL SULFATE 1 AMPULE: .5; 3 SOLUTION RESPIRATORY (INHALATION) at 23:34

## 2020-01-01 RX ADMIN — KETOROLAC TROMETHAMINE 30 MG: 30 INJECTION, SOLUTION INTRAMUSCULAR; INTRAVENOUS at 00:55

## 2020-01-01 RX ADMIN — Medication 10 ML: at 13:27

## 2020-01-01 RX ADMIN — IPRATROPIUM BROMIDE AND ALBUTEROL SULFATE 1 AMPULE: .5; 3 SOLUTION RESPIRATORY (INHALATION) at 18:28

## 2020-01-01 RX ADMIN — IPRATROPIUM BROMIDE AND ALBUTEROL SULFATE 1 AMPULE: .5; 3 SOLUTION RESPIRATORY (INHALATION) at 06:10

## 2020-01-01 RX ADMIN — OXYCODONE HYDROCHLORIDE AND ACETAMINOPHEN 1 TABLET: 5; 325 TABLET ORAL at 05:08

## 2020-01-01 RX ADMIN — OXYCODONE HYDROCHLORIDE AND ACETAMINOPHEN 1 TABLET: 5; 325 TABLET ORAL at 09:20

## 2020-01-01 RX ADMIN — ENOXAPARIN SODIUM 40 MG: 40 INJECTION SUBCUTANEOUS at 08:49

## 2020-01-01 RX ADMIN — IPRATROPIUM BROMIDE AND ALBUTEROL SULFATE 1 AMPULE: .5; 3 SOLUTION RESPIRATORY (INHALATION) at 22:53

## 2020-01-01 RX ADMIN — IPRATROPIUM BROMIDE AND ALBUTEROL SULFATE 1 AMPULE: .5; 3 SOLUTION RESPIRATORY (INHALATION) at 15:04

## 2020-01-01 RX ADMIN — IPRATROPIUM BROMIDE AND ALBUTEROL SULFATE 1 AMPULE: .5; 3 SOLUTION RESPIRATORY (INHALATION) at 19:01

## 2020-01-01 RX ADMIN — FUROSEMIDE 20 MG: 10 INJECTION, SOLUTION INTRAMUSCULAR; INTRAVENOUS at 17:16

## 2020-01-01 RX ADMIN — IBUPROFEN 600 MG: 600 TABLET, FILM COATED ORAL at 19:28

## 2020-01-01 RX ADMIN — METHYLPREDNISOLONE SODIUM SUCCINATE 40 MG: 40 INJECTION, POWDER, FOR SOLUTION INTRAMUSCULAR; INTRAVENOUS at 05:38

## 2020-01-01 RX ADMIN — POLYETHYLENE GLYCOL (3350) 17 G: 17 POWDER, FOR SOLUTION ORAL at 08:47

## 2020-01-01 RX ADMIN — POLYETHYLENE GLYCOL (3350) 17 G: 17 POWDER, FOR SOLUTION ORAL at 17:16

## 2020-01-01 RX ADMIN — ENOXAPARIN SODIUM 40 MG: 40 INJECTION SUBCUTANEOUS at 09:20

## 2020-01-01 RX ADMIN — IPRATROPIUM BROMIDE AND ALBUTEROL SULFATE 1 AMPULE: .5; 3 SOLUTION RESPIRATORY (INHALATION) at 22:49

## 2020-01-01 RX ADMIN — LORAZEPAM 0.5 MG: 0.5 TABLET ORAL at 23:04

## 2020-01-01 RX ADMIN — GUAIFENESIN AND DEXTROMETHORPHAN 5 ML: 100; 10 SYRUP ORAL at 14:37

## 2020-01-01 RX ADMIN — IPRATROPIUM BROMIDE AND ALBUTEROL SULFATE 1 AMPULE: .5; 3 SOLUTION RESPIRATORY (INHALATION) at 18:39

## 2020-01-01 RX ADMIN — OXYCODONE HYDROCHLORIDE AND ACETAMINOPHEN 1 TABLET: 5; 325 TABLET ORAL at 01:34

## 2020-01-01 RX ADMIN — GUAIFENESIN AND DEXTROMETHORPHAN 5 ML: 100; 10 SYRUP ORAL at 15:57

## 2020-01-01 RX ADMIN — PREDNISONE 60 MG: 20 TABLET ORAL at 13:38

## 2020-01-01 RX ADMIN — LORAZEPAM 0.5 MG: 0.5 TABLET ORAL at 15:56

## 2020-01-01 RX ADMIN — IPRATROPIUM BROMIDE AND ALBUTEROL SULFATE 1 AMPULE: .5; 3 SOLUTION RESPIRATORY (INHALATION) at 12:45

## 2020-01-01 RX ADMIN — IPRATROPIUM BROMIDE AND ALBUTEROL SULFATE 1 AMPULE: .5; 3 SOLUTION RESPIRATORY (INHALATION) at 07:05

## 2020-01-01 RX ADMIN — KETOROLAC TROMETHAMINE 30 MG: 30 INJECTION, SOLUTION INTRAMUSCULAR; INTRAVENOUS at 15:49

## 2020-01-01 RX ADMIN — PREDNISONE 20 MG: 20 TABLET ORAL at 08:34

## 2020-01-01 RX ADMIN — DEXTROSE MONOHYDRATE 500 MG: 50 INJECTION, SOLUTION INTRAVENOUS at 15:16

## 2020-01-01 RX ADMIN — OXYCODONE HYDROCHLORIDE AND ACETAMINOPHEN 1 TABLET: 5; 325 TABLET ORAL at 09:35

## 2020-01-01 RX ADMIN — IPRATROPIUM BROMIDE AND ALBUTEROL SULFATE 1 AMPULE: .5; 3 SOLUTION RESPIRATORY (INHALATION) at 11:31

## 2020-01-01 RX ADMIN — CEFTRIAXONE SODIUM 1 G: 1 INJECTION, POWDER, FOR SOLUTION INTRAMUSCULAR; INTRAVENOUS at 13:39

## 2020-01-01 RX ADMIN — OXYCODONE HYDROCHLORIDE AND ACETAMINOPHEN 1 TABLET: 5; 325 TABLET ORAL at 19:11

## 2020-01-01 RX ADMIN — SODIUM CHLORIDE: 9 INJECTION, SOLUTION INTRAVENOUS at 02:55

## 2020-01-01 RX ADMIN — Medication 10 ML: at 23:05

## 2020-01-01 RX ADMIN — IPRATROPIUM BROMIDE AND ALBUTEROL SULFATE 1 AMPULE: .5; 3 SOLUTION RESPIRATORY (INHALATION) at 18:45

## 2020-01-01 RX ADMIN — Medication 10 ML: at 20:06

## 2020-01-01 RX ADMIN — LACTULOSE 20 G: 10 SOLUTION ORAL at 10:44

## 2020-01-01 RX ADMIN — IPRATROPIUM BROMIDE AND ALBUTEROL SULFATE 1 AMPULE: .5; 3 SOLUTION RESPIRATORY (INHALATION) at 14:50

## 2020-01-01 RX ADMIN — OXYCODONE HYDROCHLORIDE AND ACETAMINOPHEN 1 TABLET: 5; 325 TABLET ORAL at 23:03

## 2020-01-01 RX ADMIN — FUROSEMIDE 20 MG: 10 INJECTION, SOLUTION INTRAMUSCULAR; INTRAVENOUS at 18:08

## 2020-01-01 RX ADMIN — PREDNISONE 30 MG: 20 TABLET ORAL at 10:17

## 2020-01-01 RX ADMIN — IPRATROPIUM BROMIDE AND ALBUTEROL SULFATE 1 AMPULE: .5; 3 SOLUTION RESPIRATORY (INHALATION) at 12:16

## 2020-01-01 RX ADMIN — IPRATROPIUM BROMIDE AND ALBUTEROL SULFATE 1 AMPULE: .5; 3 SOLUTION RESPIRATORY (INHALATION) at 11:13

## 2020-01-01 RX ADMIN — CEFTRIAXONE 2 G: 2 INJECTION, POWDER, FOR SOLUTION INTRAMUSCULAR; INTRAVENOUS at 16:36

## 2020-01-01 RX ADMIN — ONDANSETRON 4 MG: 2 INJECTION INTRAMUSCULAR; INTRAVENOUS at 09:20

## 2020-01-01 RX ADMIN — OXYCODONE HYDROCHLORIDE AND ACETAMINOPHEN 1 TABLET: 5; 325 TABLET ORAL at 16:36

## 2020-01-01 RX ADMIN — Medication 5 ML: at 11:06

## 2020-01-01 RX ADMIN — MORPHINE SULFATE 2 MG: 2 INJECTION, SOLUTION INTRAMUSCULAR; INTRAVENOUS at 17:20

## 2020-01-01 RX ADMIN — OXYCODONE HYDROCHLORIDE AND ACETAMINOPHEN 1 TABLET: 5; 325 TABLET ORAL at 20:55

## 2020-01-01 RX ADMIN — SODIUM CHLORIDE: 9 INJECTION, SOLUTION INTRAVENOUS at 11:08

## 2020-01-01 RX ADMIN — LORAZEPAM 0.5 MG: 0.5 TABLET ORAL at 15:57

## 2020-01-01 RX ADMIN — OXYCODONE HYDROCHLORIDE AND ACETAMINOPHEN 1 TABLET: 5; 325 TABLET ORAL at 10:18

## 2020-01-01 RX ADMIN — IOPAMIDOL 75 ML: 755 INJECTION, SOLUTION INTRAVENOUS at 07:16

## 2020-01-01 RX ADMIN — GUAIFENESIN AND DEXTROMETHORPHAN 5 ML: 100; 10 SYRUP ORAL at 10:43

## 2020-01-01 RX ADMIN — OXYCODONE HYDROCHLORIDE AND ACETAMINOPHEN 1 TABLET: 5; 325 TABLET ORAL at 02:55

## 2020-01-01 RX ADMIN — CEFTRIAXONE 2 G: 2 INJECTION, POWDER, FOR SOLUTION INTRAMUSCULAR; INTRAVENOUS at 14:44

## 2020-01-01 RX ADMIN — LORAZEPAM 0.5 MG: 0.5 TABLET ORAL at 23:17

## 2020-01-01 RX ADMIN — OXYCODONE HYDROCHLORIDE AND ACETAMINOPHEN 1 TABLET: 5; 325 TABLET ORAL at 23:17

## 2020-01-01 RX ADMIN — Medication 10 ML: at 21:00

## 2020-01-01 RX ADMIN — ONDANSETRON HYDROCHLORIDE 4 MG: 2 INJECTION, SOLUTION INTRAMUSCULAR; INTRAVENOUS at 06:31

## 2020-01-01 RX ADMIN — IPRATROPIUM BROMIDE AND ALBUTEROL SULFATE 1 AMPULE: .5; 3 SOLUTION RESPIRATORY (INHALATION) at 15:18

## 2020-01-01 RX ADMIN — HYDROMORPHONE HYDROCHLORIDE 1 MG: 1 INJECTION, SOLUTION INTRAMUSCULAR; INTRAVENOUS; SUBCUTANEOUS at 08:18

## 2020-01-01 RX ADMIN — OXYCODONE HYDROCHLORIDE AND ACETAMINOPHEN 1 TABLET: 5; 325 TABLET ORAL at 13:37

## 2020-01-01 RX ADMIN — MORPHINE SULFATE 2 MG: 2 INJECTION, SOLUTION INTRAMUSCULAR; INTRAVENOUS at 02:32

## 2020-01-01 RX ADMIN — CEFTRIAXONE 2 G: 2 INJECTION, POWDER, FOR SOLUTION INTRAMUSCULAR; INTRAVENOUS at 14:07

## 2020-01-01 RX ADMIN — IPRATROPIUM BROMIDE AND ALBUTEROL SULFATE 1 AMPULE: .5; 3 SOLUTION RESPIRATORY (INHALATION) at 15:09

## 2020-01-01 RX ADMIN — IPRATROPIUM BROMIDE AND ALBUTEROL SULFATE 1 AMPULE: .5; 3 SOLUTION RESPIRATORY (INHALATION) at 06:43

## 2020-01-01 RX ADMIN — IPRATROPIUM BROMIDE AND ALBUTEROL SULFATE 1 AMPULE: .5; 3 SOLUTION RESPIRATORY (INHALATION) at 18:54

## 2020-01-01 RX ADMIN — OXYCODONE HYDROCHLORIDE AND ACETAMINOPHEN 1 TABLET: 5; 325 TABLET ORAL at 18:03

## 2020-01-01 RX ADMIN — OXYCODONE HYDROCHLORIDE AND ACETAMINOPHEN 1 TABLET: 5; 325 TABLET ORAL at 00:55

## 2020-01-01 RX ADMIN — IPRATROPIUM BROMIDE AND ALBUTEROL SULFATE 1 AMPULE: .5; 3 SOLUTION RESPIRATORY (INHALATION) at 13:27

## 2020-01-01 RX ADMIN — SODIUM CHLORIDE 1566 ML: 9 INJECTION, SOLUTION INTRAVENOUS at 14:50

## 2020-01-01 RX ADMIN — CEFTRIAXONE SODIUM 1 G: 1 INJECTION, POWDER, FOR SOLUTION INTRAMUSCULAR; INTRAVENOUS at 13:35

## 2020-01-01 RX ADMIN — OXYCODONE HYDROCHLORIDE AND ACETAMINOPHEN 1 TABLET: 5; 325 TABLET ORAL at 21:34

## 2020-01-01 RX ADMIN — OXYCODONE HYDROCHLORIDE AND ACETAMINOPHEN 1 TABLET: 5; 325 TABLET ORAL at 12:45

## 2020-01-01 RX ADMIN — OXYCODONE HYDROCHLORIDE AND ACETAMINOPHEN 1 TABLET: 5; 325 TABLET ORAL at 05:38

## 2020-01-01 RX ADMIN — DEXTROSE MONOHYDRATE 500 MG: 50 INJECTION, SOLUTION INTRAVENOUS at 16:31

## 2020-01-01 RX ADMIN — KETOROLAC TROMETHAMINE 30 MG: 30 INJECTION, SOLUTION INTRAMUSCULAR; INTRAVENOUS at 08:20

## 2020-01-01 RX ADMIN — CEFTRIAXONE 2 G: 2 INJECTION, POWDER, FOR SOLUTION INTRAMUSCULAR; INTRAVENOUS at 13:27

## 2020-01-01 RX ADMIN — SODIUM CHLORIDE: 9 INJECTION, SOLUTION INTRAVENOUS at 12:45

## 2020-01-01 RX ADMIN — LORAZEPAM 0.5 MG: 0.5 TABLET ORAL at 20:55

## 2020-01-01 RX ADMIN — LORAZEPAM 0.5 MG: 0.5 TABLET ORAL at 04:14

## 2020-01-01 RX ADMIN — OXYCODONE HYDROCHLORIDE AND ACETAMINOPHEN 1 TABLET: 5; 325 TABLET ORAL at 18:40

## 2020-01-01 RX ADMIN — Medication 10 ML: at 20:55

## 2020-01-01 RX ADMIN — OXYCODONE HYDROCHLORIDE AND ACETAMINOPHEN 1 TABLET: 5; 325 TABLET ORAL at 05:14

## 2020-01-01 RX ADMIN — PREDNISONE 30 MG: 20 TABLET ORAL at 08:34

## 2020-01-01 RX ADMIN — POLYETHYLENE GLYCOL (3350) 17 G: 17 POWDER, FOR SOLUTION ORAL at 13:37

## 2020-01-01 RX ADMIN — DEXTROSE MONOHYDRATE 500 MG: 50 INJECTION, SOLUTION INTRAVENOUS at 15:58

## 2020-01-01 RX ADMIN — LORAZEPAM 0.5 MG: 0.5 TABLET ORAL at 04:05

## 2020-01-01 RX ADMIN — IPRATROPIUM BROMIDE AND ALBUTEROL SULFATE 1 AMPULE: .5; 3 SOLUTION RESPIRATORY (INHALATION) at 14:57

## 2020-01-01 RX ADMIN — IPRATROPIUM BROMIDE AND ALBUTEROL SULFATE 1 AMPULE: .5; 3 SOLUTION RESPIRATORY (INHALATION) at 22:51

## 2020-01-01 RX ADMIN — LORAZEPAM 0.5 MG: 0.5 TABLET ORAL at 11:13

## 2020-01-01 RX ADMIN — GUAIFENESIN AND DEXTROMETHORPHAN 5 ML: 100; 10 SYRUP ORAL at 05:41

## 2020-01-01 RX ADMIN — OXYCODONE HYDROCHLORIDE AND ACETAMINOPHEN 1 TABLET: 5; 325 TABLET ORAL at 09:10

## 2020-01-01 RX ADMIN — Medication 10 ML: at 08:59

## 2020-01-01 RX ADMIN — IPRATROPIUM BROMIDE AND ALBUTEROL SULFATE 1 AMPULE: .5; 3 SOLUTION RESPIRATORY (INHALATION) at 08:22

## 2020-01-01 RX ADMIN — MORPHINE SULFATE 2 MG: 2 INJECTION, SOLUTION INTRAMUSCULAR; INTRAVENOUS at 11:48

## 2020-01-01 RX ADMIN — OXYCODONE HYDROCHLORIDE AND ACETAMINOPHEN 1 TABLET: 5; 325 TABLET ORAL at 08:34

## 2020-01-01 RX ADMIN — OXYCODONE HYDROCHLORIDE AND ACETAMINOPHEN 1 TABLET: 5; 325 TABLET ORAL at 10:31

## 2020-01-01 RX ADMIN — IPRATROPIUM BROMIDE AND ALBUTEROL SULFATE 1 AMPULE: .5; 3 SOLUTION RESPIRATORY (INHALATION) at 07:08

## 2020-01-01 RX ADMIN — IOPAMIDOL 75 ML: 755 INJECTION, SOLUTION INTRAVENOUS at 07:35

## 2020-01-01 RX ADMIN — LORAZEPAM 0.5 MG: 0.5 TABLET ORAL at 05:41

## 2020-01-01 RX ADMIN — LORAZEPAM 0.5 MG: 0.5 TABLET ORAL at 19:35

## 2020-01-01 RX ADMIN — OXYCODONE HYDROCHLORIDE AND ACETAMINOPHEN 1 TABLET: 5; 325 TABLET ORAL at 00:09

## 2020-01-01 ASSESSMENT — PAIN SCALES - GENERAL
PAINLEVEL_OUTOF10: 6
PAINLEVEL_OUTOF10: 8
PAINLEVEL_OUTOF10: 8
PAINLEVEL_OUTOF10: 10
PAINLEVEL_OUTOF10: 10
PAINLEVEL_OUTOF10: 3
PAINLEVEL_OUTOF10: 8
PAINLEVEL_OUTOF10: 7
PAINLEVEL_OUTOF10: 0
PAINLEVEL_OUTOF10: 7
PAINLEVEL_OUTOF10: 8
PAINLEVEL_OUTOF10: 4
PAINLEVEL_OUTOF10: 10
PAINLEVEL_OUTOF10: 9
PAINLEVEL_OUTOF10: 5
PAINLEVEL_OUTOF10: 10
PAINLEVEL_OUTOF10: 8
PAINLEVEL_OUTOF10: 8
PAINLEVEL_OUTOF10: 7
PAINLEVEL_OUTOF10: 8
PAINLEVEL_OUTOF10: 7
PAINLEVEL_OUTOF10: 5
PAINLEVEL_OUTOF10: 9
PAINLEVEL_OUTOF10: 8
PAINLEVEL_OUTOF10: 10
PAINLEVEL_OUTOF10: 6
PAINLEVEL_OUTOF10: 5
PAINLEVEL_OUTOF10: 8
PAINLEVEL_OUTOF10: 9
PAINLEVEL_OUTOF10: 6
PAINLEVEL_OUTOF10: 2
PAINLEVEL_OUTOF10: 7
PAINLEVEL_OUTOF10: 7
PAINLEVEL_OUTOF10: 10
PAINLEVEL_OUTOF10: 7
PAINLEVEL_OUTOF10: 10
PAINLEVEL_OUTOF10: 7
PAINLEVEL_OUTOF10: 10
PAINLEVEL_OUTOF10: 8
PAINLEVEL_OUTOF10: 10
PAINLEVEL_OUTOF10: 9
PAINLEVEL_OUTOF10: 9
PAINLEVEL_OUTOF10: 6
PAINLEVEL_OUTOF10: 9
PAINLEVEL_OUTOF10: 9
PAINLEVEL_OUTOF10: 0
PAINLEVEL_OUTOF10: 6
PAINLEVEL_OUTOF10: 8
PAINLEVEL_OUTOF10: 7
PAINLEVEL_OUTOF10: 9
PAINLEVEL_OUTOF10: 7
PAINLEVEL_OUTOF10: 8
PAINLEVEL_OUTOF10: 8
PAINLEVEL_OUTOF10: 10
PAINLEVEL_OUTOF10: 10
PAINLEVEL_OUTOF10: 8
PAINLEVEL_OUTOF10: 8
PAINLEVEL_OUTOF10: 7
PAINLEVEL_OUTOF10: 8
PAINLEVEL_OUTOF10: 5
PAINLEVEL_OUTOF10: 9
PAINLEVEL_OUTOF10: 7
PAINLEVEL_OUTOF10: 9
PAINLEVEL_OUTOF10: 7
PAINLEVEL_OUTOF10: 7
PAINLEVEL_OUTOF10: 2
PAINLEVEL_OUTOF10: 8
PAINLEVEL_OUTOF10: 10
PAINLEVEL_OUTOF10: 8
PAINLEVEL_OUTOF10: 0

## 2020-01-01 ASSESSMENT — PAIN DESCRIPTION - PAIN TYPE
TYPE: ACUTE PAIN

## 2020-01-01 ASSESSMENT — PAIN DESCRIPTION - ONSET
ONSET: ON-GOING
ONSET: GRADUAL
ONSET: ON-GOING
ONSET: GRADUAL
ONSET: ON-GOING
ONSET: ON-GOING
ONSET: AWAKENED FROM SLEEP

## 2020-01-01 ASSESSMENT — PAIN - FUNCTIONAL ASSESSMENT

## 2020-01-01 ASSESSMENT — PAIN DESCRIPTION - ORIENTATION
ORIENTATION: MID
ORIENTATION: RIGHT;LEFT
ORIENTATION: UPPER
ORIENTATION: MID
ORIENTATION: UPPER
ORIENTATION: MID

## 2020-01-01 ASSESSMENT — ENCOUNTER SYMPTOMS
STRIDOR: 0
ABDOMINAL PAIN: 1
COLOR CHANGE: 0
WHEEZING: 0
SHORTNESS OF BREATH: 0
VOMITING: 0
SORE THROAT: 0
VOICE CHANGE: 0
WHEEZING: 0
SHORTNESS OF BREATH: 1
TROUBLE SWALLOWING: 0
NAUSEA: 0
COUGH: 0
DIARRHEA: 1
CHEST TIGHTNESS: 0
FACIAL SWELLING: 0
STRIDOR: 0
NAUSEA: 1
VOMITING: 0
COUGH: 1
TROUBLE SWALLOWING: 0
ABDOMINAL PAIN: 0
RHINORRHEA: 0

## 2020-01-01 ASSESSMENT — PAIN DESCRIPTION - LOCATION
LOCATION: ABDOMEN
LOCATION: ABDOMEN
LOCATION: CHEST
LOCATION: CHEST
LOCATION: ABDOMEN
LOCATION: CHEST;STERNUM
LOCATION: ABDOMEN;STERNUM
LOCATION: LEG
LOCATION: CHEST;STERNUM
LOCATION: CHEST;STERNUM
LOCATION: ABDOMEN
LOCATION: ABDOMEN;STERNUM
LOCATION: CHEST
LOCATION: CHEST;STERNUM

## 2020-01-01 ASSESSMENT — PAIN DESCRIPTION - PROGRESSION
CLINICAL_PROGRESSION: NOT CHANGED
CLINICAL_PROGRESSION: GRADUALLY IMPROVING
CLINICAL_PROGRESSION: GRADUALLY WORSENING
CLINICAL_PROGRESSION: NOT CHANGED
CLINICAL_PROGRESSION: GRADUALLY IMPROVING
CLINICAL_PROGRESSION: NOT CHANGED
CLINICAL_PROGRESSION: NOT CHANGED
CLINICAL_PROGRESSION: GRADUALLY WORSENING
CLINICAL_PROGRESSION: GRADUALLY IMPROVING
CLINICAL_PROGRESSION: GRADUALLY WORSENING
CLINICAL_PROGRESSION: NOT CHANGED
CLINICAL_PROGRESSION: GRADUALLY WORSENING
CLINICAL_PROGRESSION: NOT CHANGED

## 2020-01-01 ASSESSMENT — PAIN DESCRIPTION - DESCRIPTORS
DESCRIPTORS: PRESSURE;BURNING
DESCRIPTORS: BURNING
DESCRIPTORS: ACHING;SORE
DESCRIPTORS: ACHING;THROBBING;BURNING
DESCRIPTORS: CRAMPING
DESCRIPTORS: PRESSURE;BURNING
DESCRIPTORS: CRAMPING
DESCRIPTORS: DISCOMFORT;PRESSURE
DESCRIPTORS: THROBBING
DESCRIPTORS: THROBBING
DESCRIPTORS: ACHING;SORE
DESCRIPTORS: BURNING
DESCRIPTORS: ACHING;BURNING;THROBBING
DESCRIPTORS: SHARP;PRESSURE
DESCRIPTORS: ACHING;SORE

## 2020-01-01 ASSESSMENT — PAIN DESCRIPTION - FREQUENCY
FREQUENCY: INTERMITTENT
FREQUENCY: CONTINUOUS
FREQUENCY: INTERMITTENT
FREQUENCY: INTERMITTENT
FREQUENCY: CONTINUOUS
FREQUENCY: CONTINUOUS
FREQUENCY: INTERMITTENT
FREQUENCY: CONTINUOUS
FREQUENCY: INTERMITTENT

## 2020-01-01 ASSESSMENT — PAIN DESCRIPTION - DIRECTION
RADIATING_TOWARDS: CHEST
RADIATING_TOWARDS: CHEST

## 2020-08-26 NOTE — TELEPHONE ENCOUNTER
Patient did not show for New patient COPD appointment referred by Mariea Hamman with [facility]man on 8/26/20    Same Day Cancellation: No    Patient rescheduled:  No    New appointment: n/a    Patient was also no show on: n/a

## 2020-10-15 PROBLEM — J18.9 PNEUMONIA: Status: ACTIVE | Noted: 2020-01-01

## 2020-10-15 PROBLEM — J18.9 PNA (PNEUMONIA): Status: ACTIVE | Noted: 2020-01-01

## 2020-10-15 NOTE — ED NOTES
Attempt to call report. RN unable to take report. Will call when free.        Neil Abraham RN  10/15/20 9658

## 2020-10-15 NOTE — ED NOTES
203 at [facility]; [employer] [facility] 1 hr 15 mins     Morelia Day  10/15/20 1300

## 2020-10-15 NOTE — ED PROVIDER NOTES
[facility]  eMERGENCY dEPARTMENT eNCOUnter      Pt Name: Mahesh Mckeon  MRN: [id number]  [place] 1960  Date of evaluation: 10/15/2020  Provider: Presley Patino MD    [address] [place] [address]       Chief Complaint   Patient presents with    Shortness of Breath         HISTORY OF PRESENT ILLNESS   (Location/Symptom, Timing/Onset, Context/Setting, Quality, Duration, Modifying Factors, Severity)  Note limiting factors. Mahesh Mckeon is a 64 y.o. female with hx of seizures and hepatitis C who presents with chest congestion, cough, and diffuse chest and rib pain for the past week as well as shortness of breath for the past 2 days. Patient denies any fever. Patient reports her symptoms are severe, constant, and worsening. She denies any hemoptysis or leg swelling. She denies any history of blood clots. [facility]    Nursing Notes were reviewed. REVIEW OFSYSTEMS    (2-9 systems for level 4, 10 or more for level 5)     Review of Systems   Constitutional: Positive for fatigue. Negative for appetite change, fever and unexpected weight change. HENT: Negative for facial swelling, trouble swallowing and voice change. Eyes: Negative for visual disturbance. Respiratory: Positive for cough and shortness of breath. Negative for wheezing and stridor. Cardiovascular: Negative for chest pain and palpitations. Gastrointestinal: Negative for abdominal pain, nausea and vomiting. Genitourinary: Negative for dysuria and vaginal bleeding. Musculoskeletal: Positive for arthralgias and myalgias. Negative for neck pain and neck stiffness. Skin: Negative for color change and wound. Neurological: Negative for seizures and syncope. Psychiatric/Behavioral: Negative for self-injury and suicidal ideas. Except as noted above the remainder of the review of systems was reviewed and negative.        PAST MEDICAL HISTORY     Past Medical History:   Diagnosis Date    Back pain     Hepatitis C antibody positive in blood 2018    Seizures ([facility] [address]          SURGICAL HISTORY       Past Surgical History:   Procedure Laterality Date    APPENDECTOMY      CARPAL TUNNEL RELEASE       SECTION      ROTATOR CUFF REPAIR Right     SKIN GRAFT      TUBAL LIGATION           CURRENT MEDICATIONS       Previous Medications    ALBUTEROL IN    Inhale into the lungs    GABAPENTIN PO    Take by mouth Unsure of dosage    QUETIAPINE FUMARATE (SEROQUEL PO)    Take by mouth Unsure of dosage       ALLERGIES     Flexeril [cyclobenzaprine]; Ultram [tramadol hcl]; and Robaxin [methocarbamol]    FAMILY HISTORY     History reviewed. No pertinent family history.        SOCIAL HISTORY       Social History     Socioeconomic History    Marital status:      Spouse name: None    Number of children: None    Years of education: None    Highest education level: None   Occupational History    None   Social Needs    Financial resource strain: None    Food insecurity     Worry: None     Inability: None    Transportation needs     Medical: None     Non-medical: None   Tobacco Use    Smoking status: Current Every Day Smoker     Packs/day: 1.00    Smokeless tobacco: Never Used   Substance and Sexual Activity    Alcohol use: No    Drug use: Yes     Types: Methamphetamines    Sexual activity: None   Lifestyle    Physical activity     Days per week: None     Minutes per session: None    Stress: None   Relationships    Social connections     Talks on phone: None     Gets together: None     Attends Sikhism service: None     Active member of club or organization: None     Attends meetings of clubs or organizations: None     Relationship status: None    Intimate partner violence     Fear of current or ex partner: None     Emotionally abused: None     Physically abused: None     Forced sexual activity: None   Other Topics Concern    None   Social History Narrative    None         PHYSICAL EXAM    (up to 7 for level 4, 8 or more for level 5)     ED Triage Vitals   BP Temp Temp Source Pulse Resp SpO2 Height Weight   10/15/20 1249 10/15/20 1248 10/15/20 1248 10/15/20 1248 10/15/20 1248 10/15/20 1248 10/15/20 1248 10/15/20 1248   122/65 98.3 °F (36.8 °C) Oral 89 20 (!) 85 % 5' 6\" (1.676 m) 115 lb (52.2 kg)       Physical Exam  Vitals signs and nursing note reviewed. Constitutional:       Comments:  female, appears older than stated age, in moderate respiratory distress. HENT:      Head: Normocephalic and atraumatic. Right Ear: External ear normal.      Left Ear: External ear normal.   Eyes:      Conjunctiva/sclera: Conjunctivae normal.   Neck:      Musculoskeletal: Neck supple. Vascular: No JVD. Trachea: No tracheal deviation. Cardiovascular:      Rate and Rhythm: Normal rate. Pulmonary:      Comments: Moderate tachypnea to 26. No wheezing but diffuse rhonchi and crackles bilaterally. Patient speaking in 4-5 word sentences. Abdominal:      General: There is no distension. Palpations: Abdomen is soft. Tenderness: There is no abdominal tenderness. There is no guarding or rebound. Musculoskeletal: Normal range of motion. General: No tenderness or deformity. Skin:     General: Skin is warm and dry. Capillary Refill: Capillary refill takes less than 2 seconds. Neurological:      Cranial Nerves: No cranial nerve deficit. DIAGNOSTIC RESULTS     EKG:All EKG's are interpreted by the Emergency Department Physician who either signs or Co-signs this chart in the absence of a cardiologist.    The Ekg interpreted by me shows  normal sinus rhythm with a rate of 95  Axis is   Normal  QTc is  452ms  Intervals and Durations are unremarkable.       ST Segments: no acute change  No significant change from prior EKG dated 5/25/19      RADIOLOGY:     Interpretation per the Radiologist below, if available at the time of this note:    XR CHEST PORTABLE   Final Result   Bibasilar reticulonodular densities suspicious for pneumonia                 LABS:  Labs Reviewed   CBC WITH AUTO DIFFERENTIAL - Abnormal; Notable for the following components:       Result Value    WBC 13.2 (*)     RDW 18.0 (*)     Neutrophils Absolute 11.4 (*)     Lymphocytes Absolute 0.9 (*)     All other components within normal limits    Narrative:     Performed at:  [facility]. [facility]  [address] [place] [address]. [place], [employer] [address]   Phone [phone]   COMPREHENSIVE METABOLIC PANEL W/ REFLEX TO MG FOR LOW K - Abnormal; Notable for the following components:    Sodium 135 (*)     Glucose 136 (*)     CREATININE <0.5 (*)     Alb 3.2 (*)     Albumin/Globulin Ratio 0.7 (*)     Alkaline Phosphatase 170 (*)     ALT 98 (*)      (*)     All other components within normal limits    Narrative:     Performed at:  [facility]. [facility]  [address] [place] [address]. [place], [employer] [address]   Phone [phone] - Abnormal; Notable for the following components:    Pro- (*)     All other components within normal limits    Narrative:     Performed at:  [facility]. [facility]  [address] [place] [address]. [place], [facility] [address]   Phone [phone]   CULTURE, BLOOD 1   CULTURE, BLOOD 2   TROPONIN    Narrative:     Performed at:  [facility]. [facility]  [address] [place] [address]. [place], [employer] [address]   Phone [phone]   LACTIC ACID, PLASMA    Narrative:     Performed at:  [facility]. [facility]  [address] [place] [address]. [place], [employer] [address]   Phone [phone]   URINE RT REFLEX TO CULTURE   URINE DRUG SCREEN       All otherlabs were within normal range or not returned as of this dictation.     EMERGENCY DEPARTMENT COURSE and DIFFERENTIAL DIAGNOSIS/MDM:   Vitals:    Vitals:    10/15/20 1320 10/15/20 1410 10/15/20 1439 10/15/20 1530   BP: 123/64 127/70 127/70 131/81   Pulse: 95 88 88 88   Resp: 28 20 26 22   Temp: 99 °F (37.2 °C)      TempSrc: Oral      SpO2: (!) 87% 95% 95% 92%   Weight:       Height:             MDM  Patient is in acute hypoxic respiratory failure on arrival.  She requires 6 L of nasal cannula oxygen to maintain normal oxygen saturations. She does not have an oxygen requirement at baseline. Her lungs do have diffuse crackles and rhonchi bilaterally. Chest x-ray is concerning for bilateral pneumonia, white blood cell count is elevated, and the patient is tachypneic. She is given multiple breathing treatments and steroids and does have mild improvement in symptoms but continues to have an oxygen requirement. She is hemodynamically stable. The patient is given 30 mL/kg of IV fluid, blood and urine cultures are obtained, and broad-spectrum antibiotics are given. She is admitted for presumed sepsis from pneumonia and acute hypoxic respiratory failure. The patient expresses understanding and agreement with this plan.     SEP-1 CORE MEASURE DATA    Classification: severe sepsis    Amount of fluids ordered: at least 30mL/kg based on entered actual weight at time of triage    Time at which sepsis was identified: 13:16    Broad-spectrum antibiotics chosen:  based on sepsis order-set for a suspected source of: Pulmonary - Community Acquired    Repeat lactate level: not indicated    On reassessment after fluid resuscitation:   Vitals update: /81   Pulse 88   Temp 99 °F (37.2 °C) (Oral)   Resp 22   Ht 5' 6\" (1.676 m)   Wt 115 lb (52.2 kg)   LMP 11/12/2012   SpO2 92%   BMI 18.56 kg/m² , cardiopulmonary exam: Diffuse rhonchi's bilaterally, capillary refill: 2 seconds, peripheral pulses: 2+, skin examination: Well-perfused      CONSULTS:  Hospitalist    PROCEDURES:  Unless otherwise noted below, none     Critical Care  Performed by: Kiera Escudero MD  Authorized by: Kiera Escudero MD     Critical care provider statement:     Critical

## 2020-10-15 NOTE — ED NOTES
Opal Mayes returned page      [facility]  10/15/20 [phone]

## 2020-10-15 NOTE — ED TRIAGE NOTES
Presents to triage with c/o rib pain and chest pain ongoing for a week. Also c/o shortness of breath x 2 days. C/o congested cough but is unable to expectorate.

## 2020-10-16 NOTE — PROGRESS NOTES
RESPIRATORY THERAPY ASSESSMENT    Name:  Jorge Landers Record Number:  [id number]  Age: 64 y.o. Gender: female  : 1960  Today's Date:  10/16/2020  Room:  0203/0203-01    Assessment     Is the patient being admitted for a COPD or Asthma exacerbation? No   (If yes the patient will be seen every 4 hours for the first 24 hours and then reassessed)    Patient Admission Diagnosis      Allergies  Allergies   Allergen Reactions    Flexeril [Cyclobenzaprine]      Causes legs to be restless    Ultram [Tramadol Hcl]     Robaxin [Methocarbamol] Nausea And Vomiting       Minimum Predicted Vital Capacity:               Actual Vital Capacity:                    Pulmonary History:COPD  Home Oxygen Therapy:  room air  Home Respiratory Therapy:Albuterol   Current Respiratory Therapy:  duoneb q4wa  Treatment Type: MDI  Medications: Ipratropium Bromide    Respiratory Severity Index(RSI)   Patients with orders for inhalation medications, oxygen, or any therapeutic treatment modality will be placed on Respiratory Protocol. They will be assessed with the first treatment and at least every 72 hours thereafter. The following severity scale will be used to determine frequency of treatment intervention.     Smoking History: Pulmonary Disease or Smoking History, Greater than 15 pack year = 2    Social History  Social History     Tobacco Use    Smoking status: Current Every Day Smoker     Packs/day: 1.00    Smokeless tobacco: Never Used   Substance Use Topics    Alcohol use: No    Drug use: Yes     Types: Methamphetamines, Marijuana       Recent Surgical History: None = 0  Past Surgical History  Past Surgical History:   Procedure Laterality Date    APPENDECTOMY      CARPAL TUNNEL RELEASE       SECTION      ROTATOR CUFF REPAIR Right     SKIN GRAFT      TUBAL LIGATION         Level of Consciousness: Alert, Oriented, and Cooperative = 0    Level of Activity: Walking with assistance = 1    Respiratory Pattern: Dyspnea with exertion;Irregular pattern;or RR less than 6 = 2    Breath Sounds: Diminshed bilaterally and/or crackles = 2    Sputum   ,  ,    Cough: Strong, productive = 1    Vital Signs   /69   Pulse 66   Temp 97.4 °F (36.3 °C) (Oral)   Resp 18   Ht 5' 6\" (1.676 m)   Wt 127 lb 9.6 oz (57.9 kg)   LMP 11/12/2012   SpO2 95%   BMI 20.60 kg/m²   SPO2 (COPD values may differ): 86-87% on room air or greater than 92% on FiO2 35- 50% = 3    Peak Flow (asthma only): not applicable = 0    RSI: 09-17 = Q6H or QID and Q4HPRN for dyspnea        Plan       Goals: mobilize retained secretions, volume expansion and improve oxygenation    Patient/caregiver was educated on the proper method of use for Respiratory Care Devices:  Yes      Level of patient/caregiver understanding able to:   ? Verbalize understanding   ? Demonstrate understanding       ? Teach back        ? Needs reinforcement       ? No available caregiver               ? Other:     Response to education:  Good     Is patient being placed on Home Treatment Regimen? No     Does the patient have everything they need prior to discharge? NA     Comments: pt assessed and chart reviewed    Plan of Care: albuterol/atrovent q4wa    Electronically signed by Orquidea Morales RCP on 10/16/2020 at 2:30 AM    Respiratory Protocol Guidelines     1. Assessment and treatment by Respiratory Therapy will be initiated for medication and therapeutic interventions upon initiation of aerosolized medication. 2. Physician will be contacted for respiratory rate (RR) greater than 35 breaths per minute. Therapy will be held for heart rate (HR) greater than 140 beats per minute, pending direction from physician. 3. Bronchodilators will be administered via Metered Dose Inhaler (MDI) with spacer when the following criteria are met:  a. Alert and cooperative     b. HR < 140 bpm  c. RR < 30 bpm                d. Can demonstrate a 2-3 second inspiratory hold  4.  Bronchodilators will be administered via Hand Held Nebulizer [facility]) to patients when ANY of the following criteria are met  a. Incognizant or uncooperative          b. Patients treated with HHN at Home        c. Unable to demonstrate proper use of MDI with spacer     d. RR > 30 bpm   5. Bronchodilators will be delivered via Metered Dose Inhaler (MDI), HHN, Aerogen to intubated patients on mechanical ventilation. 6. Inhalation medication orders will be delivered and/or substituted as outlined below. Aerosolized Medications Ordering and Administration Guidelines:    1. All Medications will be ordered by a physician, and their frequency and/or modality will be adjusted as defined by the patients Respiratory Severity Index (RSI) score. 2. If the patient does not have documented COPD, consider discontinuing anticholinergics when RSI is less than 9.  3. If the bronchospasm worsens (increased RSI), then the bronchodilator frequency can be increased to a maximum of every 4 hours. If greater than every 4 hours is required, the physician will be contacted. 4. If the bronchospasm improves, the frequency of the bronchodilator can be decreased, based on the patient's RSI, but not less than home treatment regimen frequency. 5. Bronchodilator(s) will be discontinued if patient has a RSI less than 9 and has received no scheduled or as needed treatment for 72  Hrs. Patients Ordered on a Mucolytic Agent:    1. Must always be administered with a bronchodilator. 2. Discontinue if patient experiences worsened bronchospasm, or secretions have lessened to the point that the patient is able to clear them with a cough. Anti-inflammatory and Combination Medications:    1. If the patient lacks prior history of lung disease, is not using inhaled anti-inflammatory medication at home, and lacks wheezing by examination or by history for at least 24 hours, contact physician for possible discontinuation.

## 2020-10-16 NOTE — CARE COORDINATION
CM chart review and screening for DCP needs. Multiple calls to Pt room to interview and assess DCP needs. Pt does not answer. Pt is currently on 4 liters of supplemental oxygen and does not have a history of having home O2. She is IPTA at home. Rapid C-19 is negative. CM will follow from periphery for possible new home O2 needs.

## 2020-10-16 NOTE — PROGRESS NOTES
Sputum culture obtained and sent to lab. Patient complaints of cough and cough associated pain. New orders per Brett Garcia NP. Will continue to monitor.      Marc Iglesias RN

## 2020-10-16 NOTE — ED NOTES
EMS @ bedside, report provided to EMS team, all questions addressed.       Lula Blum RN  10/15/20 [id number]

## 2020-10-16 NOTE — CONSULTS
results for input(s): NITRITE, COLORU, PHUR, LABCAST, WBCUA, RBCUA, MUCUS, TRICHOMONAS, YEAST, BACTERIA, CLARITYU, SPECGRAV, LEUKOCYTESUR, UROBILINOGEN, BILIRUBINUR, BLOODU, GLUCOSEU, AMORPHOUS in the last 72 hours. Invalid input(s): KETONESU  No results for input(s): PHART, AOZ0EPG, PO2ART in the last 72 hours.     Chest imaging was reviewed by me and showed   10/15/2020 bilateral reticulonodular infiltrate    8/28/2019 CT chest bronchial wall thickening and innumerable centrilobular nodules    ASSESSMENT:  · Acute hypoxemic respiratory failure, requiring up to 6 L in the emergency department  · Community acquired pneumonia   · COPD with exacerbation  · Paraseptal emphysema  · Hyponatremia   · H/O THC, opiate and amphetamine abuse  · H/O epilepsy  · Ongoing tobacco use with a greater than 30-pack-year history    PLAN:  Supplemental oxygen to maintain SaO2 >92%; wean as tolerated    IV solumedrol with plan to convert to oral prednisone with improvement  Inhaled bronchodilators   Ceftriaxone and azithromycin D#1  Outpatient PFT   I recommend discussion of Low dose chest CT for lung cancer screening per USPSTF recommendation   Tobacco cessation

## 2020-10-16 NOTE — H&P
smokeless tobacco.  ETOH:   reports no history of alcohol use. Family History:   Positive as follows:    History reviewed. No pertinent family history. REVIEW OF SYSTEMS:       Constitutional: Negative for fever + fatigue  HENT: Negative for sore throat   Eyes: Negative for redness   Respiratory: + dyspnea, cough   Cardiovascular: + chest pain   Gastrointestinal: + nausea, vomiting, diarrhea   Genitourinary: Negative for hematuria   Musculoskeletal: + arthralgias, weakness  Skin: Negative for rash   Neurological: Negative for syncope   Psychiatric/Behavorial: Negative for anxiety    PHYSICAL EXAM:    /72   Pulse 73   Temp 98 °F (36.7 °C) (Oral)   Resp 18   Ht 5' 6\" (1.676 m)   Wt 127 lb 9.6 oz (57.9 kg)   LMP 11/12/2012   SpO2 96%   BMI 20.60 kg/m²     Gen: No distress. Alert. Eyes: PERRL. No sclera icterus. No conjunctival injection. ENT: No discharge. Pharynx clear. Neck: Trachea midline. Resp: + accessory muscle use, tachypnea. No crackles. + wheezes. No rhonchi. CV: Regular rate. Regular rhythm. No murmur. No rub. 1+ BLE edema. GI: Non-tender. Non-distended. Normal bowel sounds. No hernia. Skin: Warm and dry. No nodule on exposed extremities. No rash on exposed extremities. M/S: No cyanosis. No joint deformity. No clubbing. Neuro: Awake. Grossly nonfocal    Psych: Oriented x 3. No anxiety or agitation.      CBC:   Recent Labs     10/15/20  1250 10/16/20  0610   WBC 13.2* 15.8*   HGB 12.7 12.2   HCT 37.8 36.0   MCV 89.8 89.6    184     BMP:   Recent Labs     10/15/20  1250 10/16/20  0610   * 126*   K 4.2 4.0    97*   CO2 27 24   BUN 16 16   CREATININE <0.5* <0.5*     LIVER PROFILE:   Recent Labs     10/15/20  1250 10/16/20  0610   * 100*   ALT 98* 73*   BILITOT 0.8 1.0   ALKPHOS 170* 136*       CARDIAC ENZYMES  Recent Labs     10/15/20  1250   TROPONINI <0.01       CULTURES  Blood: pending  Sputum: pending  COVID: not detected    EKG:  I have reviewed the EKG with the following interpretation:   Sinus rhythm with Fusion complexes, Septal infarct     RADIOLOGY  XR CHEST PORTABLE   Final Result   Bibasilar reticulonodular densities suspicious for pneumonia               Active Problems:    Pneumonia    PNA (pneumonia)  Resolved Problems:    * No resolved hospital problems. *        ASSESSMENT/PLAN:  Acute hypoxic respiratory failure  - related to pneumonia, COPD exacerbation  - admitted to med-surg. Pulmonology consulted. - supplemental O2. Was requiring up to 6 L. Now weaned to [address] as tolerated. Pneumonia, Gram positive organism  - Treat with Rocephin, Zithromax D#1  - Duonebs scheduled/PRN. COPD exacerbation  - Treat with IV Solu-medrol, Antibiotics as above  - HHN Treatments. - pulm consulted. Sepsis  - fever, leukocytosis, tachypnea  - POA  - due to pneumonia  - treat as above. Monitor for improvement  - blood cx pending    Chest, rib pain  - Hycodan prn. Hyponatremia  - due to poor PO intake, fluid volume depletion.   - give IVFs. Monitor labs. Seizures  - reports last seizure was a week ago. - not on any medications. Hepatitis C  Elevated LFT's.   - H/o IV Drug use  - F/w PCP, GI    Tobacco Use  - quit 2 weeks ago per patient. Cannabis use  Methamphetamine use  - recommended cessation.      DVT Prophylaxis: Lovenox  Diet: DIET GENERAL;  Code Status: Full Code     Plan of care discussed with Dr. Kenton ROLON-C  10/16/2020

## 2020-10-16 NOTE — PROGRESS NOTES
Patient continues to have a productive cough; sputum is green/brown, but also has a red tinge to it. NP Whit Villalobos notified.

## 2020-10-16 NOTE — PROGRESS NOTES
Shift assessment complete. See flow sheet. Scheduled meds given. See MAR. Provided sputum container and educated pt on giving sample. PRN tylenol given, see MAR. Pt is SOB O2 checked and is at 98% on 4 L NC. Call light and bedside table within reach. No further needs noted at this time. Will continue to monitor.

## 2020-10-16 NOTE — PLAN OF CARE
Problem: Falls - Risk of:  Goal: Will remain free from falls  Description: Will remain free from falls  10/16/2020 1141 by Jacolyn Canavan, RN  Outcome: Ongoing  10/16/2020 0709 by Man Zuñiga RN  Outcome: Ongoing     Problem: Infection:  Goal: Will remain free from infection  Description: Will remain free from infection  10/16/2020 1141 by Jacolyn Canavan, RN  Outcome: Ongoing  10/16/2020 0709 by Man Zuñiga RN  Outcome: Ongoing     Problem: Safety:  Goal: Free from accidental physical injury  Description: Free from accidental physical injury  10/16/2020 1141 by Jacolyn Canavan, RN  Outcome: Ongoing  10/16/2020 0709 by Man Zuñiga RN  Outcome: Ongoing     Problem: Pain:  Goal: Patient's pain/discomfort is manageable  Description: Patient's pain/discomfort is manageable  10/16/2020 1141 by Jacolyn Canavan, RN  Outcome: Ongoing  10/16/2020 0709 by Man Zuñiga RN  Outcome: Ongoing     Problem: Skin Integrity:  Goal: Skin integrity will stabilize  Description: Skin integrity will stabilize  10/16/2020 1141 by Jacolyn Canavan, RN  Outcome: Ongoing  10/16/2020 0709 by Man Zuñiga RN  Outcome: Ongoing

## 2020-10-16 NOTE — PROGRESS NOTES
Patient scored high on the francis fall risk scale. Interventions taken; verified bed/chair alarm is activated, yellow socks placed on patient, and stay with me sign posted outside patients room.

## 2020-10-17 NOTE — PROGRESS NOTES
Pt resting. Resp e/e. Shift assessment completed and charted. No needs. Will monitor.  Christophe Board

## 2020-10-17 NOTE — FLOWSHEET NOTE
10/17/20 0900   Vital Signs   Temp 98.5 °F (36.9 °C)   Temp Source Oral   Pulse 93   Resp 18   /64   BP Location Left upper arm   Patient Position Semi fowlers   Level of Consciousness 0   MEWS Score 1   Patient Currently in Pain Yes   Oxygen Therapy   SpO2 94 %   O2 Device Nasal cannula   O2 Flow Rate (L/min) 4 L/min   Shift assessment complete. See flow sheet. Scheduled meds given. See MAR. PRN pain medication and Zofran given. Pt continues to have productive cough and SOBCall light and bedside table within reach. No further needs noted at this time. Will continue to monitor.

## 2020-10-17 NOTE — PROGRESS NOTES
Pulmonary Progress Note  CC: Shortness of breath    Subjective: Shortness of breath little better, chest pain a little better    IV line peripheral    EXAM:   BP (!) 113/59   Pulse 86   Temp 98.6 °F (37 °C) (Oral)   Resp 20   Ht 5' 6\" (1.676 m)   Wt 127 lb 9.6 oz (57.9 kg)   LMP 11/12/2012   SpO2 98%   BMI 20.60 kg/m²  on 4 L  Constitutional:  No acute distress, appears more comfortable  HEENT: no scleral icterus  Neck: No tracheal deviation present. Cardiovascular: Normal heart sounds. Pulmonary/Chest: No wheezes. + rhonchi. No rales. No decreased breath sounds. No accessory muscle usage or stridor. Abdominal: Soft. Musculoskeletal: No cyanosis. No clubbing. Skin: Skin is warm and dry.      Scheduled Meds:   [START ON 10/18/2020] cefTRIAXone (ROCEPHIN) IV  2 g Intravenous Q24H    And    azithromycin  500 mg Intravenous Q24H    methylPREDNISolone  40 mg Intravenous Daily    ipratropium-albuterol  1 ampule Inhalation Q4H WA    sodium chloride flush  10 mL Intravenous 2 times per day    enoxaparin  40 mg Subcutaneous Daily     Continuous Infusions:   sodium chloride 100 mL/hr at 10/17/20 1632     PRN Meds:  ipratropium-albuterol, guaiFENesin-dextromethorphan, oxyCODONE-acetaminophen, LORazepam, sodium chloride flush, acetaminophen **OR** acetaminophen, polyethylene glycol, promethazine **OR** ondansetron    Labs:  CBC:   Recent Labs     10/15/20  1250 10/16/20  0610 10/17/20  0458   WBC 13.2* 15.8* 18.3*   HGB 12.7 12.2 12.0   HCT 37.8 36.0 35.9*   MCV 89.8 89.6 91.2    184 205     BMP:   Recent Labs     10/15/20  1250 10/16/20  0610 10/17/20  0458   * 126* 131*   K 4.2 4.0 4.1    97* 98*   CO2 27 24 25   BUN 16 16 13   CREATININE <0.5* <0.5* <0.5*       Cultures:  10/15/2020 blood culture strep viridans    Films:  10/15/2020 bilateral reticulonodular infiltrate    8/28/2019 CT chest bronchial wall thickening and innumerable centrilobular nodules    ASSESSMENT:  · Acute hypoxemic respiratory failure, requiring up to 6 L in the emergency department  · Community acquired pneumonia   · COPD with exacerbation  · Paraseptal emphysema  · Hyponatremia   · H/O THC, opiate and amphetamine abuse  · H/O epilepsy  · Ongoing tobacco use with a greater than 30-pack-year history    PLAN:  Supplemental oxygen to maintain SaO2 >92%; wean as tolerated    IV solumedrol with plan to convert to oral prednisone with improvement  Inhaled bronchodilators   Ceftriaxone and azithromycin D#2  Outpatient PFT   I recommend discussion of Low dose chest CT for lung cancer screening per USPSTF recommendation prior to discharge or as outpatient  Tobacco cessation

## 2020-10-17 NOTE — PROGRESS NOTES
Progress Note    Admit Date:  10/15/2020    Patient admitted with pneumonia and COPD exacerbation. Seen by pulmonology      Subjective:  Ms. Rafael Mari still looks dyspneic, complains of a cough and  shortness of breath although her symptoms are much better than when she came in . Was requiring 6 L of oxygen currently down to 4 L, still using accessory muscles and has diffuse rhonchi. Complains of constipation. blood cultures are growing strep viridans    Objective:   BP (!) 113/59   Pulse 86   Temp 98.6 °F (37 °C) (Oral)   Resp 20   Ht 5' 6\" (1.676 m)   Wt 127 lb 9.6 oz (57.9 kg)   LMP 11/12/2012   SpO2 96%   BMI 20.60 kg/m²       Intake/Output Summary (Last 24 hours) at 10/17/2020 1433  Last data filed at 10/17/2020 1215  Gross per 24 hour   Intake 2745 ml   Output --   Net 2745 ml         Physical Exam:  General:  Awake, alert, mild distress   Looks dyspneic    Skin:  Warm and dry  Neck:  JVD absent. Neck supple  Chest:   + accessory muscle use . + rhonchi   Cardiovascular:  RRR ,S1S2 normal  Abdomen:  Soft, non tender, + distended, BS +  Extremities:  No edema. Intact peripheral pulses.  Brisk cap refill, < 2 secs  Neuro: non focal      Medications:   Scheduled Meds:   [START ON 10/18/2020] cefTRIAXone (ROCEPHIN) IV  2 g Intravenous Q24H    And    azithromycin  500 mg Intravenous Q24H    methylPREDNISolone  40 mg Intravenous Daily    ipratropium-albuterol  1 ampule Inhalation Q4H WA    sodium chloride flush  10 mL Intravenous 2 times per day    enoxaparin  40 mg Subcutaneous Daily       Continuous Infusions:   sodium chloride 100 mL/hr at 10/17/20 0255       Data:  CBC:   Recent Labs     10/15/20  1250 10/16/20  0610 10/17/20  0458   WBC 13.2* 15.8* 18.3*   RBC 4.21 4.02 3.93*   HGB 12.7 12.2 12.0   HCT 37.8 36.0 35.9*   MCV 89.8 89.6 91.2   RDW 18.0* 18.1* 18.2*    184 205     BMP:   Recent Labs     10/15/20  1250 10/16/20  0610 10/17/20  0458   * 126* 131*   K 4.2 4.0 4.1   CL 100 97* 98*   CO2 27 24 25   BUN 16 16 13   CREATININE <0.5* <0.5* <0.5*     BNP: No results for input(s): BNP in the last 72 hours. PT/INR: No results for input(s): PROTIME, INR in the last 72 hours. APTT: No results for input(s): APTT in the last 72 hours. CARDIAC ENZYMES:   Recent Labs     10/15/20  1250   TROPONINI <0.01     FASTING LIPID PANEL:No results found for: CHOL, HDL, TRIG  LIVER PROFILE:   Recent Labs     10/15/20  1250 10/16/20  0610 10/17/20  0458   * 100* 72*   ALT 98* 73* 64*   BILITOT 0.8 1.0 0.7   ALKPHOS 170* 136* 152*        Radiology  XR CHEST PORTABLE   Final Result   Bibasilar reticulonodular densities suspicious for pneumonia               Cultures  Sputum culture gram-positive cocci  Blood cultures 1 out of 2 Streptococcus  Viridans   COVID-19 not detected    Assessment:  Active Problems:    Pneumonia    Community acquired pneumonia    Acute respiratory failure with hypoxia (HCC)    COPD exacerbation (HCC)    Hyponatremia  Resolved Problems:    * No resolved hospital problems. *      Plan:    Acute hypoxic respiratory failure  - related to pneumonia, COPD exacerbation  - admitted to med-surg. Pulmonology consulted. - supplemental O2. Was requiring up to 6 L. Now weaned to [address] as tolerated.      Pneumonia, Gram positive organism  - cont to treat with Rocephin, Zithromax D#2  - Duonebs scheduled/PRN. -Seen by pulmonology    COPD exacerbation  - Treat with IV Solu-medrol, Antibiotics as above  - N Treatments. - pulm consulted.      Sepsis  - fever, leukocytosis, tachypnea  - POA  - due to pneumonia  - treat as above. Monitor for improvement  - blood cx  Strep viridans      Chest, rib pain  Cough   - Hycodan prn. Hyponatremia  - due to poor PO intake, fluid volume depletion.   - give IVFs. Monitor labs. Sodium 131 today     Seizures  - reports last seizure was a week ago. - not on any medications.       Hepatitis C  Elevated LFT's.   - H/o IV Drug use  - F/w PCP, GI     Tobacco Use  - quit 2 weeks ago per patient.      Cannabis use  Methamphetamine use  - recommended cessation.       Constipation  - PRN glycolax     DVT Prophylaxis: Lovenox  Diet: DIET GENERAL;  Code Status: Full Code   Reece Tate MD 10/17/2020 2:33 PM

## 2020-10-18 NOTE — PROGRESS NOTES
Pt had 18 beats of SVT in the 150's. Went back to check on pt and pt was asleep. Perfect serve sent to Dr. Carolyn Mcginnis to update.  Edison Reed

## 2020-10-18 NOTE — FLOWSHEET NOTE
10/18/20 0830   Vital Signs   Temp 97.9 °F (36.6 °C)   Temp Source Oral   Pulse 90   Heart Rate Source Monitor   Resp 22   /72   BP Location Right lower arm   BP Upper/Lower Upper   Patient Position Semi fowlers   Level of Consciousness 0   MEWS Score 2   Patient Currently in Pain Yes   Oxygen Therapy   SpO2 93 %   O2 Device Nasal cannula   O2 Flow Rate (L/min) 4 L/min   Shift assessment complete. See flow sheet. Scheduled meds given. See MAR. Pt states that she is feeling a bit better but still in pain, PRN pain medications are being given per protocol, but pt states it is not helping. Call light and bedside table within reach. No further needs noted at this time. Will continue to monitor.

## 2020-10-18 NOTE — PROGRESS NOTES
Pulmonary Progress Note  CC: Shortness of breath    Subjective: More chest pain today, requesting opiate dose increase    IV line peripheral    EXAM:   /69   Pulse 93   Temp 97.1 °F (36.2 °C) (Oral)   Resp 20   Ht 5' 6\" (1.676 m)   Wt 127 lb 9.6 oz (57.9 kg)   LMP 11/12/2012   SpO2 96%   BMI 20.60 kg/m²  on 4 L  Constitutional:  No acute distress, appears more comfortable  HEENT: no scleral icterus  Neck: No tracheal deviation present. Cardiovascular: Normal heart sounds. Pulmonary/Chest: ++ wheezes. + rhonchi. No rales. No decreased breath sounds. No accessory muscle usage or stridor. To palpation, there does appear to be some instability of the costochondral junctions anteriorly  Abdominal: Soft. Musculoskeletal: No cyanosis. No clubbing. Skin: Skin is warm and dry.      Scheduled Meds:   cefTRIAXone (ROCEPHIN) IV  2 g Intravenous Q24H    And    azithromycin  500 mg Intravenous Q24H    methylPREDNISolone  40 mg Intravenous Daily    ipratropium-albuterol  1 ampule Inhalation Q4H WA    sodium chloride flush  10 mL Intravenous 2 times per day    enoxaparin  40 mg Subcutaneous Daily     Continuous Infusions:   sodium chloride 100 mL/hr at 10/18/20 1245     PRN Meds:  magnesium hydroxide, ketorolac, ipratropium-albuterol, guaiFENesin-dextromethorphan, oxyCODONE-acetaminophen, LORazepam, sodium chloride flush, acetaminophen **OR** acetaminophen, polyethylene glycol, promethazine **OR** ondansetron    Labs:  CBC:   Recent Labs     10/16/20  0610 10/17/20  0458 10/18/20  0505   WBC 15.8* 18.3* 17.8*   HGB 12.2 12.0 11.5*   HCT 36.0 35.9* 35.1*   MCV 89.6 91.2 92.7    205 211     BMP:   Recent Labs     10/16/20  0610 10/17/20  0458 10/18/20  0505   * 131* 130*   K 4.0 4.1 4.5   CL 97* 98* 99   CO2 24 25 24   BUN 16 13 13   CREATININE <0.5* <0.5* <0.5*       Cultures:  10/15/2020 blood culture strep viridans   2nd strep colony not yet identified  10/15/2020 SARS-CoV-2

## 2020-10-18 NOTE — PROGRESS NOTES
<0.5*     BNP: No results for input(s): BNP in the last 72 hours. PT/INR: No results for input(s): PROTIME, INR in the last 72 hours. APTT: No results for input(s): APTT in the last 72 hours. CARDIAC ENZYMES:   Recent Labs     10/15/20  1250   TROPONINI <0.01     FASTING LIPID PANEL:No results found for: CHOL, HDL, TRIG  LIVER PROFILE:   Recent Labs     10/16/20  0610 10/17/20  0458 10/18/20  0505   * 72* 69*   ALT 73* 64* 61*   BILITOT 1.0 0.7 0.4   ALKPHOS 136* 152* 148*        Radiology  XR CHEST PORTABLE   Final Result   Bibasilar reticulonodular densities suspicious for pneumonia               Cultures  Sputum culture gram-positive cocci  Blood cultures 1 out of 2 Streptococcus  Viridans   COVID-19 not detected    Assessment:  Active Problems:    Pneumonia    Community acquired pneumonia    Acute respiratory failure with hypoxia (HCC)    COPD exacerbation (HCC)    Hyponatremia  Resolved Problems:    * No resolved hospital problems. *      Plan:    Acute hypoxic respiratory failure  - related to pneumonia, COPD exacerbation  - admitted to med-surg. Pulmonology consulted. - supplemental O2. Was requiring up to 6 L. Now weaned to [address] as tolerated.      Pneumonia, Gram positive organism  - cont to treat with Rocephin, Zithromax D#3  - Duonebs scheduled/PRN. -Seen by pulmonology    Chest wall pain, rib pain  Cough   - due to PNA  - on robitussin  prn.  - will order toradol   CT chest ordered by pulm    COPD exacerbation  - cont to treat with IV Solu-medrol, Antibiotics as above  - HHN Treatments. - pulm consulted      Sepsis  - fever, leukocytosis, tachypnea  - POA  - due to pneumonia  - treat as above. Monitor for improvement  - blood cx  Strep viridans     Hyponatremia  - due to poor PO intake, fluid volume depletion.   - give IVFs. Monitor labs. Sodium 131-> 130 today     Seizures  - reports last seizure was a week ago. - not on any medications.       Hepatitis C  Elevated LFT's.   - H/o IV Drug use  - F/w PCP, GI     Tobacco Use  - quit 2 weeks ago per patient.      Cannabis use  Methamphetamine use  - recommended cessation.       Constipation  - PRN glycolax     DVT Prophylaxis: Lovenox  Diet: DIET GENERAL;  Code Status: Full Code   Alonso Cota MD 10/18/2020 12:38 PM

## 2020-10-19 NOTE — PROGRESS NOTES
Patient arrived back to floor from ultrasound. Bandage on left side is clean, dry, and intact. Patient denies pain at site and states, \"I actually feel better but I already feel like I am already filling up with more fluid. \" Will continue to monitor.

## 2020-10-19 NOTE — PROGRESS NOTES
RESPIRATORY THERAPY ASSESSMENT    Name:  Jorge Landers Record Number:  [id number]  Age: 64 y.o. Gender: female  : 1960  Today's Date:  10/19/2020  Room:  0203/0203-01    Assessment     Is the patient being admitted for a COPD or Asthma exacerbation? No   (If yes the patient will be seen every 4 hours for the first 24 hours and then reassessed)    Patient Admission Diagnosis      Allergies  Allergies   Allergen Reactions    Flexeril [Cyclobenzaprine]      Causes legs to be restless    Ultram [Tramadol Hcl]     Robaxin [Methocarbamol] Nausea And Vomiting       Minimum Predicted Vital Capacity:               Actual Vital Capacity:                    Pulmonary History: COPD  Home Oxygen Therapy:   none  Home Respiratory Therapy: Albuterol    Current Respiratory Therapy:   Duoneb Q4WA, Duoneb prn   Treatment Type: HHN  Medications: Albuterol/Ipratropium    Respiratory Severity Index(RSI)   Patients with orders for inhalation medications, oxygen, or any therapeutic treatment modality will be placed on Respiratory Protocol. They will be assessed with the first treatment and at least every 72 hours thereafter. The following severity scale will be used to determine frequency of treatment intervention.     Smoking History: Mild Exacerbation = 3    Social History  Social History     Tobacco Use    Smoking status: Current Every Day Smoker     Packs/day: 1.00    Smokeless tobacco: Never Used   Substance Use Topics    Alcohol use: No    Drug use: Yes     Types: Methamphetamines, Marijuana       Recent Surgical History: None = 0  Past Surgical History  Past Surgical History:   Procedure Laterality Date    APPENDECTOMY      CARPAL TUNNEL RELEASE       SECTION      ROTATOR CUFF REPAIR Right     SKIN GRAFT      TUBAL LIGATION         Level of Consciousness: Alert, Oriented, and Cooperative = 0    Level of Activity: Walking with assistance = 1    Respiratory Pattern: Dyspnea with exertion;Irregular pattern;or RR less than 6 = 2    Breath Sounds: Absent bilaterally and/or with wheezes = 3    Sputum  Sputum Color: [place], Tenacity: Thick, Sputum How Obtained: Spontaneous cough  Cough: Strong, spontaneous, non-productive = 0    Vital Signs   /69   Pulse 93   Temp 97.1 °F (36.2 °C) (Oral)   Resp 20   Ht 5' 6\" (1.676 m)   Wt 127 lb 9.6 oz (57.9 kg)   LMP 11/12/2012   SpO2 95%   BMI 20.60 kg/m²   SPO2 (COPD values may differ): 86-87% on room air or greater than 92% on FiO2 35- 50% = 3    Peak Flow (asthma only): not applicable = 0    RSI: 06-20 = Q6H or QID and Q4HPRN for dyspnea        Plan       Goals:  Medication delivery     Patient/caregiver was educated on the proper method of use for Respiratory Care Devices:  Yes      Level of patient/caregiver understanding able to:   ? Verbalize understanding   ? Demonstrate understanding       ? Teach back        ? Needs reinforcement       ? No available caregiver               ? Other:     Response to education:  Good     Is patient being placed on Home Treatment Regimen? No     Does the patient have everything they need prior to discharge? NA     Comments:  Chart reviewed, patient assessed    Plan of Care:  Joe Joy Q4prn     Electronically signed by Hannah Giron RCP on 10/19/2020 at 12:56 AM    Respiratory Protocol Guidelines     1. Assessment and treatment by Respiratory Therapy will be initiated for medication and therapeutic interventions upon initiation of aerosolized medication. 2. Physician will be contacted for respiratory rate (RR) greater than 35 breaths per minute. Therapy will be held for heart rate (HR) greater than 140 beats per minute, pending direction from physician. 3. Bronchodilators will be administered via Metered Dose Inhaler (MDI) with spacer when the following criteria are met:  a.  Alert and cooperative     b. HR < 140 bpm  c. RR < 30 bpm                d. Can demonstrate a 2-3 second inspiratory hold  4. Bronchodilators will be administered via Hand Held Nebulizer [facility]) to patients when ANY of the following criteria are met  a. Incognizant or uncooperative          b. Patients treated with HHN at Home        c. Unable to demonstrate proper use of MDI with spacer     d. RR > 30 bpm   5. Bronchodilators will be delivered via Metered Dose Inhaler (MDI), HHN, Aerogen to intubated patients on mechanical ventilation. 6. Inhalation medication orders will be delivered and/or substituted as outlined below. Aerosolized Medications Ordering and Administration Guidelines:    1. All Medications will be ordered by a physician, and their frequency and/or modality will be adjusted as defined by the patients Respiratory Severity Index (RSI) score. 2. If the patient does not have documented COPD, consider discontinuing anticholinergics when RSI is less than 9.  3. If the bronchospasm worsens (increased RSI), then the bronchodilator frequency can be increased to a maximum of every 4 hours. If greater than every 4 hours is required, the physician will be contacted. 4. If the bronchospasm improves, the frequency of the bronchodilator can be decreased, based on the patient's RSI, but not less than home treatment regimen frequency. 5. Bronchodilator(s) will be discontinued if patient has a RSI less than 9 and has received no scheduled or as needed treatment for 72  Hrs. Patients Ordered on a Mucolytic Agent:    1. Must always be administered with a bronchodilator. 2. Discontinue if patient experiences worsened bronchospasm, or secretions have lessened to the point that the patient is able to clear them with a cough. Anti-inflammatory and Combination Medications:    1. If the patient lacks prior history of lung disease, is not using inhaled anti-inflammatory medication at home, and lacks wheezing by examination or by history for at least 24 hours, contact physician for possible discontinuation.

## 2020-10-19 NOTE — PROGRESS NOTES
Progress Note    Admit Date:  10/15/2020    Patient admitted with pneumonia and COPD exacerbation. Seen by pulmonology    Blood cx with strep viridans group  No fevers  BP stable    Subjective:  Ms. Jose Cruz Guadalupe states that she does not feel any better today. She remains dyspneic with a persistent wheeze. Continues to require oxygen 4 L. Reports abd distention now       Objective:   BP (!) 117/59   Pulse 85   Temp 98.7 °F (37.1 °C) (Oral)   Resp 20   Ht 5' 6\" (1.676 m)   Wt 127 lb 9.6 oz (57.9 kg)   LMP 11/12/2012   SpO2 95%   BMI 20.60 kg/m²       Intake/Output Summary (Last 24 hours) at 10/19/2020 0746  Last data filed at 10/18/2020 1815  Gross per 24 hour   Intake 960 ml   Output --   Net 960 ml         Physical Exam:      General:  Awake, alert and oriented. Appears to be not in any distress  Mucous Membranes:  Pink , anicteric  Neck: No JVD, no carotid bruit, no thyromegaly  Chest:  Diminished alva with scattered crackles in bases  Cardiovascular:  RRR S1S2 heard, no murmurs or gallops  Abdomen:  Soft, ++distended with ascites, non tender, no organomegaly, BS present  Extremities: 1+ alva LE edema .  Distal pulses well felt  Neurological : grossly normal        Medications:   Scheduled Meds:   cefTRIAXone (ROCEPHIN) IV  2 g Intravenous Q24H    And    azithromycin  500 mg Intravenous Q24H    methylPREDNISolone  40 mg Intravenous Daily    ipratropium-albuterol  1 ampule Inhalation Q4H WA    sodium chloride flush  10 mL Intravenous 2 times per day    enoxaparin  40 mg Subcutaneous Daily       Continuous Infusions:   sodium chloride 100 mL/hr at 10/19/20 0135       Data:  CBC:   Recent Labs     10/17/20  0458 10/18/20  0505 10/19/20  0537   WBC 18.3* 17.8* 16.7*   RBC 3.93* 3.78* 3.79*   HGB 12.0 11.5* 11.6*   HCT 35.9* 35.1* 34.8*   MCV 91.2 92.7 91.8   RDW 18.2* 18.6* 18.8*    211 225     BMP:   Recent Labs     10/17/20  0458 10/18/20  0505 10/19/20  0537   * 130* 133*   K 4.1 4.5 4.4 CL 98* 99 100   CO2 25 24 27   BUN 13 13 16   CREATININE <0.5* <0.5* <0.5*       LIVER PROFILE:   Recent Labs     10/17/20  0458 10/18/20  0505 10/19/20  0537   AST 72* 69* 66*   ALT 64* 61* 60*   BILITOT 0.7 0.4 0.5   ALKPHOS 152* 148* 164*        Radiology  CT CHEST W CONTRAST   Final Result   Bilateral pleural effusions, right greater than left with adjacent   consolidation the lung bases, either due to atelectasis or pneumonia      Scattered patchy ground-glass opacities are seen in the apices, left greater   than right, either due to early pneumonia or edema. There is a background of   pulmonary emphysema      Cirrhosis and moderate ascites. XR CHEST PORTABLE   Final Result   Bibasilar reticulonodular densities suspicious for pneumonia               Cultures  Sputum culture gram-positive cocci  Blood cultures 2 Streptococcus  Viridans   Repeat blood cx pending    COVID-19 not detected    Assessment:  Active Problems:    Pneumonia    Community acquired pneumonia    Acute respiratory failure with hypoxia (HCC)    COPD exacerbation (HCC)    Hyponatremia  Resolved Problems:    * No resolved hospital problems. *      Plan:    Acute hypoxic respiratory failure  - related to pneumonia, COPD exacerbation  - admitted to med-surg. Pulmonology consulted. - supplemental O2. Was requiring up to 6 L. Now weaned to [address] as tolerated.      CAP with strep bacteremia  - cont to with Rocephin, Zithromax D#3  - Duonebs scheduled/PRN. -Seen by pulmonology    Chest wall pain, rib pain  Cough   - due to PNA  - on robitussin  prn.  - ct chest with no clear source of pain     COPD exacerbation  - cont to treat with IV Solu-medrol, Antibiotics as above  - HHN Treatments. - pulm consulted      Sepsis  - fever, leukocytosis, tachypnea  - POA  - due to pneumonia  - treat as above.  Monitor for improvement  - blood cx  Strep viridans ,increased rocephin, obtain ECHO   - repeat blood cx     Hyponatremia  - due to poor PO intake, fluid volume depletion.   - give IVFs. Monitor labs. Sodium 131-> 130 today     Seizures  - reports last seizure was a week ago. - not on any medications. Hepatitis B and C with cirrhosis   Decompensated with ascites   -chronic abn LFT   - H/o IV Drug use  -- arrange for para today given dyspnea and abd distention      Tobacco Use  - quit 2 weeks ago per patient.      Cannabis use  Methamphetamine use  - recommended cessation.       Constipation  - PRN glycolax     DVT Prophylaxis: Lovenox  Diet: DIET GENERAL;  Code Status: Full Code         Hoda Pineda MD 10/19/2020 7:46 AM

## 2020-10-19 NOTE — PROGRESS NOTES
Pt called out with complains of SOB. Respiratory called. Pt oxygen is 96% on 3L at this time. Pt does have labored breathing. Educated on breathing technique and repositioned. Pt denies other needs.

## 2020-10-19 NOTE — PROGRESS NOTES
Pt states n/v/d for the past few days. States h/o esophageal ca. Chemo last Thursday. States has been unable to eat and supposed to drink 3 ensure a day. States has been unable to drink the ensure. States lower abd pain.  Pt also states having burning with Pulmonary Progress Note  CC: Shortness of breath    Subjective: c/o chest and abd pain    EXAM:   /69   Pulse 85   Temp 97.5 °F (36.4 °C) (Oral)   Resp 20   Ht 5' 6\" (1.676 m)   Wt 127 lb 9.6 oz (57.9 kg)   LMP 11/12/2012   SpO2 92%   BMI 20.60 kg/m²  on 3 L  Constitutional:  No acute distress, appears more comfortable  HEENT: no scleral icterus  Neck: No tracheal deviation present. Cardiovascular: Normal heart sounds. Pulmonary/Chest: no  wheezes. no rhonchi. No rales. No decreased breath sounds. No accessory muscle usage or stridor. Abdominal: distended, soft  Musculoskeletal: No cyanosis. No clubbing. Skin: Skin is warm and dry.      Scheduled Meds:   cefTRIAXone (ROCEPHIN) IV  2 g Intravenous Q24H    And    azithromycin  500 mg Intravenous Q24H    methylPREDNISolone  40 mg Intravenous Daily    ipratropium-albuterol  1 ampule Inhalation Q4H WA    sodium chloride flush  10 mL Intravenous 2 times per day    enoxaparin  40 mg Subcutaneous Daily     Continuous Infusions:    PRN Meds:  perflutren lipid microspheres, magnesium hydroxide, ketorolac, ipratropium-albuterol, guaiFENesin-dextromethorphan, oxyCODONE-acetaminophen, LORazepam, sodium chloride flush, acetaminophen **OR** acetaminophen, polyethylene glycol, promethazine **OR** ondansetron    Labs:  CBC:   Recent Labs     10/17/20  0458 10/18/20  0505 10/19/20  0537   WBC 18.3* 17.8* 16.7*   HGB 12.0 11.5* 11.6*   HCT 35.9* 35.1* 34.8*   MCV 91.2 92.7 91.8    211 225     BMP:   Recent Labs     10/17/20  0458 10/18/20  0505 10/19/20  0537   * 130* 133*   K 4.1 4.5 4.4   CL 98* 99 100   CO2 25 24 27   BUN 13 13 16   CREATININE <0.5* <0.5* <0.5*       Cultures:  10/15/2020 blood culture strep viridans   2nd strep colony not yet identified  10/15/2020 SARS-CoV-2 negative    Films:  10/15/2020 bilateral reticulonodular infiltrate    Chest CT 10/19/20:   Bilateral pleural effusions, right greater than left with adjacent consolidation the lung bases, either due to atelectasis or pneumonia         Scattered patchy ground-glass opacities are seen in the apices, left greater    than right, either due to early pneumonia or edema. Alejandra Mandujano is a background of    pulmonary emphysema         Cirrhosis and moderate ascites. ASSESSMENT:  · Acute hypoxemic respiratory failure, requiring up to 6 L in the emergency department  · Community acquired pneumonia   · COPD with exacerbation  · R> Left pleural effusion  · Chest pain may be related to recent fall during seizure.    · Paraseptal emphysema  · ESLD with ascites  · H/O THC, opiate and amphetamine abuse  · H/O epilepsy  · Ongoing tobacco use with a greater than 30-pack-year history    PLAN:  Supplemental oxygen to maintain SaO2 >92%; wean as tolerated    Change to prednisone taper  Inhaled bronchodilators   Ceftriaxone and azithromycin D#4  Outpatient PFT   Tobacco cessation  Paracentesis planned today  Right thoracentesis tomorrow  Repeat chest ct in 2-3 months

## 2020-10-20 NOTE — PROGRESS NOTES
Progress Note    Admit Date:  10/15/2020    Patient admitted with pneumonia and COPD exacerbation. Seen by pulmonology    Blood cx with strep viridans group  No fevers  BP stable  S.p para with 600 ml removal     Subjective:    Ms. Estrellita Livingston states that she does not feel any better today. Continues to require oxygen 4 L.  abd distention remains , for thoracocentesis today   Asking for pain meds      Objective:   BP (!) 102/54   Pulse 82   Temp 97 °F (36.1 °C) (Oral)   Resp 20   Ht 5' 6\" (1.676 m)   Wt 127 lb 9.6 oz (57.9 kg)   LMP 11/12/2012   SpO2 95%   BMI 20.60 kg/m²       Intake/Output Summary (Last 24 hours) at 10/20/2020 0738  Last data filed at 10/19/2020 2305  Gross per 24 hour   Intake 490 ml   Output --   Net 490 ml         Physical Exam:      General:  Awake, alert and oriented. Appears to be not in any distress  Mucous Membranes:  Pink , anicteric  Neck: No JVD, no carotid bruit, no thyromegaly  Chest:  Diminished alva with scattered crackles in bases  Cardiovascular:  RRR S1S2 heard, no murmurs or gallops  Abdomen:  Soft, ++distended with ascites, non tender, no organomegaly, BS present  Extremities: 1+ alva LE edema .  Distal pulses well felt  Neurological : grossly normal        Medications:   Scheduled Meds:   predniSONE  30 mg Oral Daily    cefTRIAXone (ROCEPHIN) IV  2 g Intravenous Q24H    And    azithromycin  500 mg Intravenous Q24H    ipratropium-albuterol  1 ampule Inhalation Q4H WA    sodium chloride flush  10 mL Intravenous 2 times per day    enoxaparin  40 mg Subcutaneous Daily       Continuous Infusions:      Data:  CBC:   Recent Labs     10/18/20  0505 10/19/20  0537 10/20/20  0612   WBC 17.8* 16.7* 15.6*   RBC 3.78* 3.79* 3.82*   HGB 11.5* 11.6* 11.6*   HCT 35.1* 34.8* 35.0*   MCV 92.7 91.8 91.6   RDW 18.6* 18.8* 18.6*    225 235     BMP:   Recent Labs     10/18/20  0505 10/19/20  0537 10/20/20  0612   * 133* 133*   K 4.5 4.4 4.1   CL 99 100 101   CO2 24 27 26 BUN 13 16 13   CREATININE <0.5* <0.5* <0.5*       LIVER PROFILE:   Recent Labs     10/18/20  0505 10/19/20  0537 10/20/20  0612   AST 69* 66* 61*   ALT 61* 60* 56*   BILITOT 0.4 0.5 0.5   ALKPHOS 148* 164* 155*        Radiology  US GUIDED PARACENTESIS   Final Result   Successful ultrasound guided paracentesis. CT CHEST W CONTRAST   Final Result   Bilateral pleural effusions, right greater than left with adjacent   consolidation the lung bases, either due to atelectasis or pneumonia      Scattered patchy ground-glass opacities are seen in the apices, left greater   than right, either due to early pneumonia or edema. There is a background of   pulmonary emphysema      Cirrhosis and moderate ascites. XR CHEST PORTABLE   Final Result   Bibasilar reticulonodular densities suspicious for pneumonia         US THORACENTESIS    (Results Pending)         Cultures  Sputum culture gram-positive cocci  Blood cultures 2 Streptococcus  Viridans   Repeat blood cx pending    COVID-19 not detected    Assessment:  Active Problems:    Pneumonia    Community acquired pneumonia    Acute respiratory failure with hypoxia (HCC)    COPD exacerbation (HCC)    Hyponatremia    Streptococcal bacteremia    Cirrhosis of liver with ascites ([facility] [address]  Resolved Problems:    * No resolved hospital problems. *      Plan:    Acute hypoxic respiratory failure  - related to pneumonia, COPD exacerbation  - admitted to med-surg. Pulmonology consulted. - supplemental O2. Was requiring up to 6 L. Now weaned to [address] as tolerated.      CAP with strep bacteremia  - cont to with Rocephin, Zithromax D#4  - Duonebs scheduled/PRN. -Seen by pulmonology    Chest wall pain, rib pain  Cough   - due to PNA  - on robitussin  prn.  - ct chest with no clear source of pain   - toradol added  - pt seeking narcotics - I refused     COPD exacerbation  - cont to treat with IV Solu-medrol, Antibiotics as above  - HHN Treatments.    - pulm consulted    Sepsis  - fever, leukocytosis, tachypnea  - POA  - due to pneumonia  - treat as above. Monitor for improvement  - blood cx  Strep viridans ,increased rocephin, obtain ECHO   - repeat blood cx so far neg    Hyponatremia  - due to poor PO intake, fluid volume depletion.   - give IVFs. Monitor labs. Sodium 131-> 133     Seizures  - reports last seizure was a week ago. - not on any medications. Hepatitis B and C with cirrhosis   Decompensated with ascites   -chronic abn LFT   - H/o IV Drug use  -- had para for evaluation of infection , fluid does not appear infected  - start diuretics       Tobacco Use  - quit 2 weeks ago per patient.      Cannabis use  Methamphetamine use  - recommended cessation.       Constipation  - PRN glycolax     DVT Prophylaxis: Lovenox  Diet: DIET GENERAL;  Code Status: Full Code         Parth Hare MD 10/20/2020 7:38 AM

## 2020-10-20 NOTE — PROGRESS NOTES
Pulmonary Progress Note  CC: Shortness of breath    Subjective: c/o chest and abd pain    EXAM:   /68   Pulse 76   Temp 97.1 °F (36.2 °C) (Oral)   Resp 18   Ht 5' 6\" (1.676 m)   Wt 127 lb 9.6 oz (57.9 kg)   LMP 11/12/2012   SpO2 96%   BMI 20.60 kg/m²  on 3 L  Constitutional:  No acute distress, appears more comfortable  HEENT: no scleral icterus  Neck: No tracheal deviation present. Cardiovascular: Normal heart sounds. Pulmonary/Chest: no  wheezes. no rhonchi. No rales. No decreased breath sounds. No accessory muscle usage or stridor. Abdominal: distended, soft  Musculoskeletal: No cyanosis. No clubbing. Skin: Skin is warm and dry.      Scheduled Meds:   predniSONE  30 mg Oral Daily    cefTRIAXone (ROCEPHIN) IV  2 g Intravenous Q24H    And    azithromycin  500 mg Intravenous Q24H    ipratropium-albuterol  1 ampule Inhalation Q4H WA    sodium chloride flush  10 mL Intravenous 2 times per day    enoxaparin  40 mg Subcutaneous Daily     Continuous Infusions:    PRN Meds:  perflutren lipid microspheres, magnesium hydroxide, ketorolac, ipratropium-albuterol, guaiFENesin-dextromethorphan, oxyCODONE-acetaminophen, LORazepam, sodium chloride flush, acetaminophen **OR** acetaminophen, polyethylene glycol, promethazine **OR** ondansetron    Labs:  CBC:   Recent Labs     10/18/20  0505 10/19/20  0537 10/20/20  0612   WBC 17.8* 16.7* 15.6*   HGB 11.5* 11.6* 11.6*   HCT 35.1* 34.8* 35.0*   MCV 92.7 91.8 91.6    225 235     BMP:   Recent Labs     10/18/20  0505 10/19/20  0537 10/20/20  0612   * 133* 133*   K 4.5 4.4 4.1   CL 99 100 101   CO2 24 27 26   BUN 13 16 13   CREATININE <0.5* <0.5* <0.5*       Cultures:  10/15/2020 blood culture strep viridans   2nd strep colony not yet identified  10/15/2020 SARS-CoV-2 negative    Films:  10/15/2020 bilateral reticulonodular infiltrate    Chest CT 10/19/20:   Bilateral pleural effusions, right greater than left with adjacent    consolidation the lung bases, either due to atelectasis or pneumonia         Scattered patchy ground-glass opacities are seen in the apices, left greater    than right, either due to early pneumonia or edema. Dewitte Peto is a background of    pulmonary emphysema         Cirrhosis and moderate ascites. ASSESSMENT:  · Acute hypoxemic respiratory failure, requiring up to 6 L in the emergency department  · Community acquired pneumonia   · COPD with exacerbation  · R> Left pleural effusion  · Chest pain may be related to recent fall during seizure.    · Paraseptal emphysema  · ESLD with ascites s/p paracentesis 10/19/20  · H/O THC, opiate and amphetamine abuse  · H/O epilepsy  · Ongoing tobacco use with a greater than 30-pack-year history    PLAN:  Supplemental oxygen to maintain SaO2 >92%; wean as tolerated    Prednisone taper  Inhaled bronchodilators   Ceftriaxone and azithromycin D#4  Outpatient PFT   Tobacco cessation  Right thoracentesis today  Repeat chest ct in 2-3 months

## 2020-10-20 NOTE — PROGRESS NOTES
Patient attended Phase 2 Cardiac Rehab Exercise Session. Further documentation will be completed in [facility] [facility] and will be scanned into the medical record upon discharge.     Spoke to Ultrasound department regarding thoracentesis that was scheduled today. Radiology stated that they wanted to wait until COVID-19 result has resulted.  RN stated that she needs it done, per attending MD. Frank Pablo call back now

## 2020-10-20 NOTE — PROGRESS NOTES
Patient in extreme pain made RN aware that patient needs pain meds refused HHN at this time, patient has an in creased rate and is tripoding. Will continue to monitor.   Harika Lord RRT

## 2020-10-20 NOTE — PROGRESS NOTES
Taken out of COVID precautions with Attending MD permission. Patient is allowed to have 2mg morphine every 2 hours but this is to be discontinued AFTER thoracentesis is completed.      Per MD ok to take out of precautions

## 2020-10-20 NOTE — PROGRESS NOTES
Patient is stating that she is 10/10 pain and is wanting more frequent pain medication. Attending perfectserved. Patient is stating that she is unable to breathe, oxygenation on 4L O2 is 95%. Patient does have some crackles in her lower lobes and is seen hyperventilating. RN applied warm blanket and stated that she will put a note in chart. Will continue to monitor.

## 2020-10-21 NOTE — PROGRESS NOTES
Pulmonary Progress Note  CC: Shortness of breath    Subjective:c/o chest and abd pain    EXAM:   /65   Pulse 93   Temp 98.1 °F (36.7 °C) (Oral)   Resp 18   Ht 5' 6\" (1.676 m)   Wt 127 lb 9.6 oz (57.9 kg)   LMP 11/12/2012   SpO2 94%   BMI 20.60 kg/m²  on 3 L  Constitutional:  No acute distress, appears more comfortable  HEENT: no scleral icterus  Neck: No tracheal deviation present. Cardiovascular: Normal heart sounds. Pulmonary/Chest: no  wheezes. no rhonchi. No rales. No decreased breath sounds. No accessory muscle usage or stridor. Abdominal: distended, soft  Musculoskeletal: No cyanosis. No clubbing. Skin: Skin is warm and dry.      Scheduled Meds:   furosemide  20 mg Intravenous BID    predniSONE  30 mg Oral Daily    cefTRIAXone (ROCEPHIN) IV  2 g Intravenous Q24H    And    azithromycin  500 mg Intravenous Q24H    ipratropium-albuterol  1 ampule Inhalation Q4H WA    sodium chloride flush  10 mL Intravenous 2 times per day    enoxaparin  40 mg Subcutaneous Daily     Continuous Infusions:    PRN Meds:  perflutren lipid microspheres, magnesium hydroxide, ketorolac, ipratropium-albuterol, guaiFENesin-dextromethorphan, oxyCODONE-acetaminophen, LORazepam, sodium chloride flush, acetaminophen **OR** acetaminophen, polyethylene glycol, promethazine **OR** ondansetron    Labs:  CBC:   Recent Labs     10/19/20  0537 10/20/20  0612 10/21/20  0539   WBC 16.7* 15.6* 15.1*   HGB 11.6* 11.6* 11.3*   HCT 34.8* 35.0* 33.8*   MCV 91.8 91.6 91.0    235 246     BMP:   Recent Labs     10/19/20  0537 10/20/20  0612 10/21/20  0539   * 133* 134*   K 4.4 4.1 4.3    101 100   CO2 27 26 29   BUN 16 13 12   CREATININE <0.5* <0.5* <0.5*       Cultures:  10/15/2020 blood culture strep viridans   2nd strep colony not yet identified  10/15/2020 SARS-CoV-2 negative    Films:  10/15/2020 bilateral reticulonodular infiltrate    Chest CT 10/19/20:   Bilateral pleural effusions, right greater than left with adjacent    consolidation the lung bases, either due to atelectasis or pneumonia         Scattered patchy ground-glass opacities are seen in the apices, left greater    than right, either due to early pneumonia or edema. Dustin Spatz is a background of    pulmonary emphysema         Cirrhosis and moderate ascites. ASSESSMENT:  · Acute hypoxemic respiratory failure, requiring up to 6 L in the emergency department  · Community acquired pneumonia   · COPD with exacerbation  · R> Left pleural effusion: s/p thora 10/21 with 450cc out  · Chest pain may be related to recent fall during seizure.    · Paraseptal emphysema  · ESLD with ascites s/p paracentesis 10/19/20  · H/O THC, opiate and amphetamine abuse  · H/O epilepsy    PLAN:  Supplemental oxygen to maintain SaO2 >92%; wean as tolerated    Prednisone taper  Inhaled bronchodilators   Ceftriaxone and azithromycin D#5  F/u pleural fluid labs  Outpatient PFT   Repeat chest ct in 2-3 months  DC planning ok with pulm

## 2020-10-21 NOTE — PLAN OF CARE
Problem: Falls - Risk of:  Goal: Will remain free from falls  Description: Will remain free from falls  Outcome: Ongoing     Problem: Daily Care:  Goal: Daily care needs are met  Description: Daily care needs are met  Outcome: Ongoing     Problem: Pain:  Goal: Patient's pain/discomfort is manageable  Description: Patient's pain/discomfort is manageable  Outcome: Ongoing

## 2020-10-21 NOTE — CARE COORDINATION
INTERDISCIPLINARY PLAN OF CARE CONFERENCE    Date/Time: 10/21/2020 1:00 PM  Completed by: Rodriguez Cintron, Case Management      Patient Name:  Yony Mistry  YOB: 1960  Admitting Diagnosis: Pneumonia [J18.9]  PNA (pneumonia) [J18.9]  PNA (pneumonia) [J18.9]     Admit Date/Time:  10/15/2020 12:33 PM    Chart reviewed. Interdisciplinary team contacted or reviewed plan related to patient progress and discharge plans. Disciplines included Case Management, Nursing, and Dietitian. Current Status: monitor labs, vs, O2  PT/OT recommendation for discharge plan of care: n/a    Expected D/C Disposition:  Home  Confirmed plan with patient and/or family Yes confirmed with: (name) with pt    Discharge Plan Comments: pt cont to plan to go home at discharge. Will follow for poss home O2 needs.     Home O2 in place on admit: No  Pt informed of need to bring portable home O2 tank on day of discharge for nursing to connect prior to leaving:  Not Indicated  Verbalized agreement/Understanding:  Not Indicated

## 2020-10-21 NOTE — PROGRESS NOTES
Pm assessment completed. See flow sheet. Pt states extreme pain 9/10 in chest and stomach. Will give prn morphine when available. No other requests at this time. Call light within reach.

## 2020-10-21 NOTE — PROGRESS NOTES
Progress Note    Admit Date:  10/15/2020    Patient admitted with pneumonia and COPD exacerbation. Seen by pulmonology    Blood cx with strep viridans group  covid negx2  No fevers  BP stable  S.p para with 600 ml removal     Subjective:    Ms. Marjorie Jorgensen states that morphine helping her pain   Planned for thoracocentesis today     Objective:   BP (!) 96/44   Pulse 93   Temp 97.4 °F (36.3 °C) (Oral)   Resp 16   Ht 5' 6\" (1.676 m)   Wt 127 lb 9.6 oz (57.9 kg)   LMP 11/12/2012   SpO2 95%   BMI 20.60 kg/m²     No intake or output data in the 24 hours ending 10/21/20 0734      Physical Exam:      General:  Thin middle aged female, old appearing  Awake, alert and oriented. Appears to be not in any distress  Mucous Membranes:  Pink , anicteric  Neck: No JVD, no carotid bruit, no thyromegaly  Chest:  Diminished alva with scattered crackles in bases  Cardiovascular:  RRR S1S2 heard, no murmurs or gallops  Abdomen:  Soft, ++distended with ascites, non tender, no organomegaly, BS present  Extremities: 1+ alva LE edema .  Distal pulses well felt  Neurological : grossly normal        Medications:   Scheduled Meds:   predniSONE  30 mg Oral Daily    cefTRIAXone (ROCEPHIN) IV  2 g Intravenous Q24H    And    azithromycin  500 mg Intravenous Q24H    ipratropium-albuterol  1 ampule Inhalation Q4H WA    sodium chloride flush  10 mL Intravenous 2 times per day    enoxaparin  40 mg Subcutaneous Daily       Continuous Infusions:      Data:  CBC:   Recent Labs     10/19/20  0537 10/20/20  0612 10/21/20  0539   WBC 16.7* 15.6* 15.1*   RBC 3.79* 3.82* 3.72*   HGB 11.6* 11.6* 11.3*   HCT 34.8* 35.0* 33.8*   MCV 91.8 91.6 91.0   RDW 18.8* 18.6* 18.2*    235 246     BMP:   Recent Labs     10/19/20  0537 10/20/20  0612 10/21/20  0539   * 133* 134*   K 4.4 4.1 4.3    101 100   CO2 27 26 29   BUN 16 13 12   CREATININE <0.5* <0.5* <0.5*       LIVER PROFILE:   Recent Labs     10/19/20  0537 10/20/20  0612   AST 66* 61* ALT 60* 56*   BILITOT 0.5 0.5   ALKPHOS 164* 155*        Radiology  US GUIDED PARACENTESIS   Final Result   Successful ultrasound guided paracentesis. CT CHEST W CONTRAST   Final Result   Bilateral pleural effusions, right greater than left with adjacent   consolidation the lung bases, either due to atelectasis or pneumonia      Scattered patchy ground-glass opacities are seen in the apices, left greater   than right, either due to early pneumonia or edema. There is a background of   pulmonary emphysema      Cirrhosis and moderate ascites. XR CHEST PORTABLE   Final Result   Bibasilar reticulonodular densities suspicious for pneumonia         US THORACENTESIS    (Results Pending)         Cultures  Sputum culture gram-positive cocci  Blood cultures 2 Streptococcus  Viridans   Repeat blood cx NGTD    COVID-19 not detected    Assessment:  Active Problems:    Pneumonia    Community acquired pneumonia    Acute respiratory failure with hypoxia (HCC)    COPD exacerbation (HCC)    Hyponatremia    Streptococcal bacteremia    Cirrhosis of liver with ascites ([facility] [address]  Resolved Problems:    * No resolved hospital problems. *      Plan:    Acute hypoxic respiratory failure  - related to pneumonia, COPD exacerbation  - admitted to med-surg. Pulmonology consulted. - supplemental O2. Was requiring up to 6 L. Now weaned to 3 [address] as tolerated.      CAP with strep bacteremia  - cont to with Rocephin, Zithromax D#4  - Duonebs scheduled/PRN. -Seen by pulmonology    Chest wall pain, rib pain  Cough   - due to PNA  - on robitussin  prn.  - ct chest with no clear source of pain   - toradol and morphine added   - rule out empyema     COPD exacerbation  - cont to treat with IV Solu-medrol, Antibiotics as above  - HHN Treatments. - pulm consulted      Sepsis  - fever, leukocytosis, tachypnea  - POA  - due to pneumonia  - treat as above.  Monitor for improvement  - blood cx  Strep viridans ,increased rocephin, obtain ECHO   - repeat blood cx so far neg    Hyponatremia  - due to poor PO intake, fluid volume depletion.   - give IVFs. Monitor labs. Sodium 131-> 133     Seizures  - reports last seizure was a week ago. - not on any medications. Hepatitis B and C with cirrhosis   Decompensated with ascites   -chronic abn LFT   - H/o IV Drug use  -- had para for evaluation of infection , fluid does not appear infected  - start diuretics       Tobacco Use  - quit 2 weeks ago per patient.      Cannabis use  Methamphetamine use  - recommended cessation.       Constipation  - PRN glycolax     DVT Prophylaxis: Lovenox  Diet: DIET GENERAL;  Code Status: Full Code         Roberta García MD 10/21/2020 7:34 AM

## 2020-10-21 NOTE — PLAN OF CARE
Problem: Falls - Risk of:  Goal: Will remain free from falls  Description: Will remain free from falls  10/21/2020 0954 by Viky Aguirre RN  Outcome: Ongoing  10/21/2020 0954 by Viky Aguirre RN  Outcome: Ongoing  10/21/2020 0038 by Clem Cali RN  Outcome: Ongoing  Goal: Absence of physical injury  Description: Absence of physical injury  10/21/2020 0954 by Viky Aguirre RN  Outcome: Ongoing  10/21/2020 0954 by Viky Aguirre RN  Outcome: Ongoing  10/21/2020 0038 by Clem Cali RN  Outcome: Ongoing     Problem: Infection:  Goal: Will remain free from infection  Description: Will remain free from infection  10/21/2020 0954 by Viky Aguirre RN  Outcome: Ongoing  10/21/2020 0954 by Viky Aguirre RN  Outcome: Ongoing  10/21/2020 0038 by Clem Cali RN  Outcome: Ongoing     Problem: Safety:  Goal: Free from accidental physical injury  Description: Free from accidental physical injury  10/21/2020 0954 by Viky Aguirre RN  Outcome: Ongoing  10/21/2020 0954 by Viky Aguirre RN  Outcome: Ongoing  10/21/2020 0038 by Clem Cali RN  Outcome: Ongoing  Goal: Free from intentional harm  Description: Free from intentional harm  10/21/2020 0954 by Viky Aguirre RN  Outcome: Ongoing  10/21/2020 0954 by Viky Aguirre RN  Outcome: Ongoing  10/21/2020 0038 by Clem Cali RN  Outcome: Ongoing     Problem: Daily Care:  Goal: Daily care needs are met  Description: Daily care needs are met  10/21/2020 0954 by Viky Aguirre RN  Outcome: Ongoing  10/21/2020 0954 by Viky Aguirre RN  Outcome: Ongoing  10/21/2020 0038 by Clem Cali RN  Outcome: Ongoing     Problem: Pain:  Goal: Patient's pain/discomfort is manageable  Description: Patient's pain/discomfort is manageable  10/21/2020 0954 by Viky Aguirre RN  Outcome: Ongoing  10/21/2020 0954 by Viky Aguirre RN  Outcome: Ongoing  10/21/2020 0038 by Clem Cali RN  Outcome: Ongoing  Goal: Pain level will decrease  Description: Pain level will decrease  10/21/2020 0954 by Wendy Talbert RN  Outcome: Ongoing  10/21/2020 0954 by Wendy Talbert RN  Outcome: Ongoing  10/21/2020 0038 by Diamond Mckeon RN  Outcome: Ongoing  Goal: Control of acute pain  Description: Control of acute pain  10/21/2020 0954 by Wendy Talbert RN  Outcome: Ongoing  10/21/2020 0954 by Wendy Talbert RN  Outcome: Ongoing  10/21/2020 0038 by Diamond Mckeno RN  Outcome: Ongoing  Goal: Control of chronic pain  Description: Control of chronic pain  10/21/2020 0954 by Wendy Talbert RN  Outcome: Ongoing  10/21/2020 0954 by Wendy Talbert RN  Outcome: Ongoing  10/21/2020 0038 by Diamond Mckeon RN  Outcome: Ongoing     Problem: Skin Integrity:  Goal: Skin integrity will stabilize  Description: Skin integrity will stabilize  10/21/2020 0954 by Wendy Talbert RN  Outcome: Ongoing  10/21/2020 0954 by Wendy Talbert RN  Outcome: Ongoing  10/21/2020 0038 by Diamond Mckeon RN  Outcome: Ongoing     Problem: Discharge Planning:  Goal: Patients continuum of care needs are met  Description: Patients continuum of care needs are met  10/21/2020 0954 by Wendy Talbert RN  Outcome: Ongoing  10/21/2020 0954 by Wendy Talbert RN  Outcome: Ongoing  10/21/2020 0038 by Diamond Mckeon RN  Outcome: Ongoing

## 2020-10-21 NOTE — PROGRESS NOTES
Bedside report given and pt care transferred to diane RAMSEY. Pt denies needs at this time. Call light within reach.  Pain meds not due at this time

## 2020-10-22 NOTE — PROGRESS NOTES
RESPIRATORY THERAPY ASSESSMENT    Name:  Jorge Landers Record Number:  [id number]  Age: 64 y.o. Gender: female  : 1960  Today's Date:  10/22/2020  Room:  0203/0203-01    Assessment     Is the patient being admitted for a COPD or Asthma exacerbation? No   (If yes the patient will be seen every 4 hours for the first 24 hours and then reassessed)    Patient Admission Diagnosis      Allergies  Allergies   Allergen Reactions    Flexeril [Cyclobenzaprine]      Causes legs to be restless    Ultram [Tramadol Hcl]     Robaxin [Methocarbamol] Nausea And Vomiting       Minimum Predicted Vital Capacity:               Actual Vital Capacity:                    Pulmonary History:COPD  Home Oxygen Therapy:  room air  Home Respiratory Therapy:Albuterol   Current Respiratory Therapy:  Duoneb Q4W/A  &  Q4PRN. Treatment Type: HHN  Medications: Albuterol/Ipratropium    Respiratory Severity Index(RSI)   Patients with orders for inhalation medications, oxygen, or any therapeutic treatment modality will be placed on Respiratory Protocol. They will be assessed with the first treatment and at least every 72 hours thereafter. The following severity scale will be used to determine frequency of treatment intervention.     Smoking History: Mild Exacerbation = 3    Social History  Social History     Tobacco Use    Smoking status: Current Every Day Smoker     Packs/day: 1.00    Smokeless tobacco: Never Used   Substance Use Topics    Alcohol use: No    Drug use: Yes     Types: Methamphetamines, Marijuana       Recent Surgical History: None = 0  Past Surgical History  Past Surgical History:   Procedure Laterality Date    APPENDECTOMY      CARPAL TUNNEL RELEASE       SECTION      ROTATOR CUFF REPAIR Right     SKIN GRAFT      TUBAL LIGATION         Level of Consciousness: Alert, Oriented, and Cooperative = 0    Level of Activity: Walking with assistance = 1    Respiratory Pattern: Dyspnea with exertion;Irregular pattern;or RR less than 6 = 2    Breath Sounds: Diminshed bilaterally and/or crackles = 2    Sputum  Sputum Color: Juancarlos Six, Tenacity: Thick, Sputum How Obtained: Spontaneous cough  Cough: Strong, spontaneous, non-productive = 0    Vital Signs   /69   Pulse 97   Temp 98.3 °F (36.8 °C) (Oral)   Resp 20   Ht 5' 6\" (1.676 m)   Wt 127 lb 9.6 oz (57.9 kg)   LMP 11/12/2012   SpO2 93%   BMI 20.60 kg/m²   SPO2 (COPD values may differ): Greater than or equal to 92% on room air = 0    Peak Flow (asthma only): not applicable = 0    RSI: 7-8 = BID and Q4HPRN (every four hours as needed) for dyspnea        Plan       Goals: medication delivery    Patient/caregiver was educated on the proper method of use for Respiratory Care Devices:  Yes      Level of patient/caregiver understanding able to:   ? Verbalize understanding   ? Demonstrate understanding       ? Teach back        ? Needs reinforcement       ? No available caregiver               ? Other:     Response to education:  Good     Is patient being placed on Home Treatment Regimen? No     Does the patient have everything they need prior to discharge? NA     Comments: Chart reviewed and patient assessed. Plan of Care: Duoneb QID  &  Q4PRN. Electronically signed by Gee Moreno RCP on 10/22/2020 at 12:22 AM    Respiratory Protocol Guidelines     1. Assessment and treatment by Respiratory Therapy will be initiated for medication and therapeutic interventions upon initiation of aerosolized medication. 2. Physician will be contacted for respiratory rate (RR) greater than 35 breaths per minute. Therapy will be held for heart rate (HR) greater than 140 beats per minute, pending direction from physician. 3. Bronchodilators will be administered via Metered Dose Inhaler (MDI) with spacer when the following criteria are met:  a.  Alert and cooperative     b. HR < 140 bpm  c. RR < 30 bpm                d. Can demonstrate a 2-3 second

## 2020-10-22 NOTE — PROGRESS NOTES
Crying and stating she needs something more for pain. Reminded that she was just given percocet. States that this does nothing for her. Suggested Tylenol; refused. Will contact MD.  Call light in reach.

## 2020-10-22 NOTE — PROGRESS NOTES
Percocet 5/325 po and toradol 30 mg IVP given for c/o pain 9/10. Patient appearing more calm at this time. Will continue to monitor. Call light in reach.

## 2020-10-22 NOTE — PROGRESS NOTES
Pulmonary Progress Note  CC: Shortness of breath    Subjective:asks for pain meds when entering the room    EXAM:   /65   Pulse 91   Temp 97.5 °F (36.4 °C) (Oral)   Resp 18   Ht 5' 6\" (1.676 m)   Wt 127 lb 9.6 oz (57.9 kg)   LMP 11/12/2012   SpO2 96%   BMI 20.60 kg/m²  on 2 L  Constitutional:  No acute distress, appears more comfortable  HEENT: no scleral icterus  Neck: No tracheal deviation present. Cardiovascular: Normal heart sounds. Pulmonary/Chest: no  wheezes. no rhonchi. No rales. No decreased breath sounds. No accessory muscle usage or stridor. Abdominal: distended, soft  Musculoskeletal: No cyanosis. No clubbing. Skin: Skin is warm and dry.      Scheduled Meds:   ipratropium-albuterol  1 ampule Inhalation 4x daily    furosemide  20 mg Intravenous BID    predniSONE  30 mg Oral Daily    cefTRIAXone (ROCEPHIN) IV  2 g Intravenous Q24H    sodium chloride flush  10 mL Intravenous 2 times per day    enoxaparin  40 mg Subcutaneous Daily     Continuous Infusions:    PRN Meds:  perflutren lipid microspheres, magnesium hydroxide, ketorolac, ipratropium-albuterol, guaiFENesin-dextromethorphan, oxyCODONE-acetaminophen, LORazepam, sodium chloride flush, acetaminophen **OR** acetaminophen, polyethylene glycol, promethazine **OR** ondansetron    Labs:  CBC:   Recent Labs     10/20/20  0612 10/21/20  0539 10/22/20  0523   WBC 15.6* 15.1* 12.3*   HGB 11.6* 11.3* 11.9*   HCT 35.0* 33.8* 36.0   MCV 91.6 91.0 92.0    246 272     BMP:   Recent Labs     10/20/20  0612 10/21/20  0539 10/22/20  0523   * 134* 133*   K 4.1 4.3 4.0    100 98*   CO2 26 29 27   BUN 13 12 14   CREATININE <0.5* <0.5* <0.5*       Cultures:  10/15/2020 blood culture strep viridans x2    10/15/2020 SARS-CoV-2 negative    Films:  10/15/2020 bilateral reticulonodular infiltrate    Chest CT 10/19/20:   Bilateral pleural effusions, right greater than left with adjacent    consolidation the lung bases, either due to atelectasis or pneumonia         Scattered patchy ground-glass opacities are seen in the apices, left greater    than right, either due to early pneumonia or edema. Karen Collar is a background of    pulmonary emphysema         Cirrhosis and moderate ascites. ASSESSMENT:  · Acute hypoxemic respiratory failure, requiring up to 6 L in the emergency department  · Community acquired pneumonia   · COPD with exacerbation  · Strep viridians bacteremia  · R> Left pleural effusion: s/p thora 10/21 with 450cc out trasnudative  · Chest pain may be related to recent fall during seizure.    · Paraseptal emphysema  · ESLD with ascites s/p paracentesis 10/19/20  · H/O THC, opiate and amphetamine abuse  · H/O epilepsy    PLAN:  Supplemental oxygen to maintain SaO2 >92%; wean as tolerated    Prednisone taper  Inhaled bronchodilators   Ceftriaxone D#6 and completed and azithromycin D#5  Repeat paracentesis planned today  F/u pleural fluid labs  Outpatient PFT   Repeat chest ct in 2-3 months  DC planning ok with pulm

## 2020-10-22 NOTE — PROGRESS NOTES
Shift report received from [employer], RN. Patient requesting pain medication. Nurse stated that she offered patient percocet earlier but she refused. Now she states she will take the percocet. Patient appears anxious; she is talking on the telephone.

## 2020-10-22 NOTE — PROGRESS NOTES
Occupational Therapy and Physical Therapy  Patient in the bathroom upon therapist arrival. Patient independently walked out of bathroom and began pacing around room. Patient in severe pain and requesting pain medication. Notified RN but patient is not allowed to receive medication for 30 more minutes. Patient reports independence and no acute OT needs. Declined OT evaluation. Will discontinue order at this time. Please re-order if new issues arise.      Tyler Moses, OTR/L #[id number]  Radha Yusuf, MS PT, # [id number]'

## 2020-10-22 NOTE — PROGRESS NOTES
Continues to call out for pain medication. Reminded that she was just given percocet. States that it did not help. Informed that MD was contacted and she is to stay on same pain regimen. Offered ice, patient able to reposition herself. Call light in reach.

## 2020-10-22 NOTE — PROGRESS NOTES
Again requesting pain medication stating that she will take percocet when it is next due. Informed that she could also have toradol; refusing toradol at this time. Discussed that toradol is very good for pain; stated \"well it don't do nothing for me. \"

## 2020-10-22 NOTE — PROGRESS NOTES
Percocet and ativan po given at this time for c/o pain and anxiety. Patient requesting morphine; states she has been getting it for several days. Discussed repositioning and offering emotional support at this time. Will continue to monitor.

## 2020-10-22 NOTE — PROGRESS NOTES
Progress Note    Admit Date:  10/15/2020    Patient admitted with pneumonia and COPD exacerbation. Seen by pulmonology    Blood cx with strep viridans group  covid negx2  No fevers  BP stable  S.p para with 600 ml removal   S.p thora with 450 ml removal yesterday     Subjective:    Ms. Marcy Pabon states  Her abd is full again and asking for drainage today   Sob improved, off oxygen      Objective:   /65   Pulse 93   Temp 98.1 °F (36.7 °C) (Oral)   Resp 18   Ht 5' 6\" (1.676 m)   Wt 127 lb 9.6 oz (57.9 kg)   LMP 11/12/2012   SpO2 92%   BMI 20.60 kg/m²       Intake/Output Summary (Last 24 hours) at 10/22/2020 0731  Last data filed at 10/21/2020 2002  Gross per 24 hour   Intake 750 ml   Output --   Net 750 ml         Physical Exam:      General:  Thin middle aged female, old appearing  Awake, alert and oriented. Appears to be not in any distress  Mucous Membranes:  Pink , anicteric  Neck: No JVD, no carotid bruit, no thyromegaly  Chest: improved alva with scattered crackles in bases  Cardiovascular:  RRR S1S2 heard, no murmurs or gallops  Abdomen:  Soft, ++distended with ascites, non tender, no organomegaly, BS present  Extremities: 1+ alva LE edema .  Distal pulses well felt  Neurological : grossly normal        Medications:   Scheduled Meds:   ipratropium-albuterol  1 ampule Inhalation 4x daily    furosemide  20 mg Intravenous BID    predniSONE  30 mg Oral Daily    cefTRIAXone (ROCEPHIN) IV  2 g Intravenous Q24H    sodium chloride flush  10 mL Intravenous 2 times per day    enoxaparin  40 mg Subcutaneous Daily       Continuous Infusions:      Data:  CBC:   Recent Labs     10/20/20  0612 10/21/20  0539 10/22/20  0523   WBC 15.6* 15.1* 12.3*   RBC 3.82* 3.72* 3.91*   HGB 11.6* 11.3* 11.9*   HCT 35.0* 33.8* 36.0   MCV 91.6 91.0 92.0   RDW 18.6* 18.2* 18.4*    246 272     BMP:   Recent Labs     10/20/20  0612 10/21/20  0539 10/22/20  0523   * 134* 133*   K 4.1 4.3 4.0    100 98*   CO2 26 29 27   BUN 13 12 14   CREATININE <0.5* <0.5* <0.5*       LIVER PROFILE:   Recent Labs     10/20/20  0612   AST 61*   ALT 56*   BILITOT 0.5   ALKPHOS 155*        Radiology  XR CHEST PORTABLE   Final Result   No evidence of pneumothorax status post thoracentesis. Bilateral airspace disease. Findings are increased compared to prior chest   radiograph. US THORACENTESIS   Final Result   Successful ultrasound guided right-sided thoracentesis. US GUIDED PARACENTESIS   Final Result   Successful ultrasound guided paracentesis. CT CHEST W CONTRAST   Final Result   Bilateral pleural effusions, right greater than left with adjacent   consolidation the lung bases, either due to atelectasis or pneumonia      Scattered patchy ground-glass opacities are seen in the apices, left greater   than right, either due to early pneumonia or edema. There is a background of   pulmonary emphysema      Cirrhosis and moderate ascites. XR CHEST PORTABLE   Final Result   Bibasilar reticulonodular densities suspicious for pneumonia         US THORACENTESIS    (Results Pending)         Cultures  Sputum culture gram-positive cocci  Blood cultures   Streptococcus parasanguinis (1)     Antibiotic  Interpretation  ALYSSA  Status     ampicillin  Intermediate  0.5  mcg/mL      cefepime  Sensitive  <=0.25  mcg/mL      cefTRIAXone  Sensitive  <=0.25  mcg/mL      clindamycin  Sensitive  <=0.06  mcg/mL      erythromycin  Resistant  >0.5  mcg/mL      levofloxacin  Sensitive  0.5  mcg/mL      penicillin  Sensitive  0.12  mcg/mL      tetracycline  Sensitive  2  mcg/mL      vancomycin  Sensitive  0.5  mcg/mL           Repeat blood cx NGTD    COVID-19 not detected        ECHO      Normal left ventricular systolic function with an estimated ejection   fraction of 60-65%. Normal left ventricular diastolic filling pressure. Normal right ventricular size and function. No significant valvular disease noted.    No vegetations are visualized. Normal systolic pulmonary artery pressure (SPAP) estimated at 36 mmHg (RA   pressure 8 mmHg). No prior study for comparison. Assessment:  Active Problems:    Pneumonia    Community acquired pneumonia    Acute respiratory failure with hypoxia (HCC)    COPD exacerbation (HCC)    Hyponatremia    Streptococcal bacteremia    Cirrhosis of liver with ascites (HCC)  Resolved Problems:    * No resolved hospital problems. *      Plan:    Acute hypoxic respiratory failure  - related to pneumonia, COPD exacerbation  - admitted to med-surg. Pulmonology consulted. - supplemental O2. Was requiring up to 6 L. Now weaned to RA       CAP with strep viridans bacteremia  - treated with  Rocephin, Zithromax D#5  - Duonebs scheduled/PRN. -Seen by pulmonology    Chest wall pain, rib pain  Cough   - due to PNA   - toradol and morphine added   - s.p thoracocentesis, no empyema noted  - pain improved     COPD exacerbation  - cont to treat with IV Solu-medrol, Antibiotics as above  - improved symptoms, wean steroids   - HHN Treatments. - pulm consulted      Sepsis  - fever, leukocytosis, tachypnea  - POA  - due to pneumonia  - treat as above. Monitor for improvement  - blood cx  Strep viridans ,increased rocephin, repeat blood cx neg   - 2 D echo with no vegetations       Hyponatremia  - due to poor PO intake, fluid volume depletion.   - give IVFs. Monitor labs. Sodium 131-> 133     Seizures  - reports last seizure was a week ago. - not on any medications. Hepatitis B and C with cirrhosis   Decompensated with ascites   -chronic abn LFT   - H/o IV Drug use  -- had para for evaluation of infection , fluid does not appear infected  - started diuretics  - lasix 20 mg bid - add aldactone at dc      Tobacco Use  - quit 2 weeks ago per patient.      Cannabis use  Methamphetamine use  - recommended cessation.       Constipation  - PRN glycolax     DVT Prophylaxis: Lovenox  Diet: DIET GENERAL;  Code Status: Full Code       Ambulate   Check US to see if any further ascites to drain      Belkis Lorenzo MD 10/22/2020 7:31 AM

## 2020-10-22 NOTE — PROGRESS NOTES
AM assessment complete at this time. Patient crying out for pain medication. Patient advised that Toradol was available patient stated \"that don't help me it is just an inflammatory medication\". Patient was advised it helps with pain also, patient agreed to take. Repositioning techniques explained. Patient stated we are letting her set around in pain. Requesting morphine due to the only thing that helps. Patient has no further needs, in bed, call light within reach.

## 2020-10-23 NOTE — DISCHARGE SUMMARY
Name:  Renata Mendiola  Room:  0203/0203-01  MRN:    [id number]    Discharge Summary      This discharge summary is in conjunction with a complete physical exam done on the day of discharge. Discharging Physician: Dr. Gopi Smithn: 10/15/2020  Discharge:  10/23/2020    HPI taken from admission H&P:      The patient is a 64 y.o. female with hepatitis B, hepatitis C, COPD, Back pain, and seizure who presents to [facility] with c/o shortness of breath. Ongoing for the last 2 days. She reports she did fall and hit her chest.  She c/o mid-sternal chest and rib pain ongoing for the last week. Associated symptoms are productive cough, fatigue, congestion, nausea, vomiting, weakness. She denies fevers. She is hypoxic and was requiring 6 L initially. Labs with hyponatremia, elevated LFT's, and leukocytosis. COVID negative. CXR with bibasilar reticulonodular densities suspicious for pneumonia. Patient admitted to med-surg. Pulmonology consulted. Diagnoses this Admission and Hospital Course     Acute hypoxic respiratory failure  - related to pneumonia, COPD exacerbation  - admitted to [facility].  Pulmonology consulted. - supplemental O2. Was requiring up to 6 L.  Now weaned to RA     CAP with strep viridans bacteremia  - treated with  Rocephin, Zithromax D#5-->d/c on Levaquin  - Duonebs scheduled/PRN.    -Seen by pulmonology     Chest wall pain, rib pain  Cough   - due to PNA   - toradol and morphine added   - s.p thoracocentesis, no empyema noted  - pain improved     COPD exacerbation  - cont to treat with IV Solu-medrol, Antibiotics as above  - improved symptoms, wean steroids   - HHN Treatments. - pulm consulted      Sepsis  - fever, leukocytosis, tachypnea  - POA  - due to pneumonia  - treat as above.  Monitor for improvement  - blood cx  Strep viridans ,increased rocephin, repeat blood cx neg   - 2 D echo with no vegetations      Hyponatremia  - due to poor PO intake, fluid volume depletion.   - give IVFs. Monitor labs. Sodium 131-> 133     Seizures  - reports last seizure was a week ago. - not on any medications.      Hepatitis B and C with cirrhosis   Decompensated with ascites   -chronic abn LFT   - H/o IV Drug use  - had para for evaluation of infection , fluid does not appear infected  - started diuretics  - lasix 20 mg bid - add aldactone at dc      Tobacco Use  - quit 2 weeks ago per patient.      Cannabis use  Methamphetamine use  - recommended cessation.       Constipation  - PRN glycolax    Procedures (Please Review Full Report for Details)  None    Consults    Pulmonology    Physical Exam at Discharge:    /62   Pulse 92   Temp 98.5 °F (36.9 °C) (Oral)   Resp 20   Ht 5' 6\" (1.676 m)   Wt 127 lb 9.6 oz (57.9 kg)   LMP 11/12/2012   SpO2 92%   BMI 20.60 kg/m²     General:  Thin middle aged female, old appearing  Awake, alert and oriented. Appears to be not in any distress  Mucous Membranes:  Pink , anicteric  Neck: No JVD, no carotid bruit, no thyromegaly  Chest: improved alva with scattered crackles in bases  Cardiovascular:  RRR S1S2 heard, no murmurs or gallops  Abdomen:  Soft, ++distended with ascites, non tender, no organomegaly, BS present  Extremities: 1+ alva LE edema .  Distal pulses well felt  Neurological : grossly normal    CBC:   Recent Labs     10/21/20  0539 10/22/20  0523   WBC 15.1* 12.3*   HGB 11.3* 11.9*   HCT 33.8* 36.0   MCV 91.0 92.0    272     BMP:   Recent Labs     10/21/20  0539 10/22/20  0523 10/23/20  0520   * 133* 133*   K 4.3 4.0 4.2    98* 100   CO2 29 27 30   BUN 12 14 16   CREATININE <0.5* <0.5* <0.5*     CULTURES  Sputum culture gram-positive cocci  Blood cultures   Streptococcus parasanguinis (1)              Antibiotic  Interpretation  ALYSSA  Status      ampicillin  Intermediate  0.5  mcg/mL        cefepime  Sensitive  <=0.25  mcg/mL        cefTRIAXone  Sensitive  <=0.25  mcg/mL        clindamycin  Sensitive  <=0.06 mcg/mL        erythromycin  Resistant  >0.5  mcg/mL        levofloxacin  Sensitive  0.5  mcg/mL        penicillin  Sensitive  0.12  mcg/mL        tetracycline  Sensitive  2  mcg/mL        vancomycin  Sensitive  0.5  mcg/mL              Repeat blood cx NGTD  Body Fluid: NGTD  COVID-19 not detected     RADIOLOGY  US GUIDED PARACENTESIS   Final Result   Successful ultrasound guided paracentesis. XR CHEST PORTABLE   Final Result   No evidence of pneumothorax status post thoracentesis. Bilateral airspace disease. Findings are increased compared to prior chest   radiograph. US THORACENTESIS   Final Result   Successful ultrasound guided right-sided thoracentesis. US GUIDED PARACENTESIS   Final Result   Successful ultrasound guided paracentesis. CT CHEST W CONTRAST   Final Result   Bilateral pleural effusions, right greater than left with adjacent   consolidation the lung bases, either due to atelectasis or pneumonia      Scattered patchy ground-glass opacities are seen in the apices, left greater   than right, either due to early pneumonia or edema. There is a background of   pulmonary emphysema      Cirrhosis and moderate ascites. XR CHEST PORTABLE   Final Result   Bibasilar reticulonodular densities suspicious for pneumonia           Echo  ECHO    Normal left ventricular systolic function with an estimated ejection   fraction of 60-65%.   Normal left ventricular diastolic filling pressure.   Normal right ventricular size and function.   No significant valvular disease noted.   No vegetations are visualized.   Normal systolic pulmonary artery pressure (SPAP) estimated at 36 mmHg (RA   pressure 8 mmHg).   No prior study for comparison.     Discharge Medications     Medication List      START taking these medications    albuterol sulfate  (90 Base) MCG/ACT inhaler  Commonly known as:  Ventolin HFA  Inhale 2 puffs into the lungs 4 times daily as needed for Wheezing furosemide 20 MG tablet  Commonly known as:  Lasix  Take 1 tablet by mouth 2 times daily     levoFLOXacin 500 MG tablet  Commonly known as:  Levaquin  Take 1 tablet by mouth daily for 5 days     predniSONE 10 MG tablet  Commonly known as:  DELTASONE  Take 2 tablets by mouth daily for 5 days        CONTINUE taking these medications    GABAPENTIN PO     SEROQUEL PO        STOP taking these medications    ALBUTEROL IN           Where to Get Your Medications      You can get these medications from any pharmacy    Bring a paper prescription for each of these medications  · albuterol sulfate  (90 Base) MCG/ACT inhaler  · furosemide 20 MG tablet  · levoFLOXacin 500 MG tablet  · predniSONE 10 MG tablet       Discharged in stable condition to home will need to self quarantine     Follow Up:   Follow up with PCP      Sixto Alvarez MD 11/12/2020 5:27 AM

## 2020-10-23 NOTE — DISCHARGE INSTR - COC
Continuity of Care Form    Patient Name: Annabella Macdonald   :  1960  MRN:  [id number]    Admit date:  10/15/2020  Discharge date:  10/23/20    Code Status Order: Full Code   Advance Directives:   [facility] Documentation     Date/Time Healthcare Directive Type of Healthcare Directive Copy in [facility] [address] Agent's Name Healthcare Agent's Phone Number    10/15/20 2205  No, patient does not have an advance directive for healthcare treatment -- -- -- -- --          Admitting Physician:  Magdalena Menendez MD  PCP: Kirsten Douglas PA-C    Discharging Nurse: [address] [employer] [employer] Unit/Room#: 0203/0203-01  Discharging Unit Phone Number: [phone]    Emergency Contact:   Extended Emergency Contact Information  Primary Emergency Contact: Shauna Andrade  Address: [facility] [place], [address] [place] Dance of [address] Phone: [phone]  Relation: Other  Secondary Emergency Contact: Gali Sanders   [facility]  Mobile Phone: [phone]  Relation: Parent    Past Surgical History:  Past Surgical History:   Procedure Laterality Date    APPENDECTOMY      CARPAL TUNNEL RELEASE       SECTION      ROTATOR CUFF REPAIR Right     SKIN GRAFT      TUBAL LIGATION         Immunization History: There is no immunization history on file for this patient.     Active Problems:  Patient Active Problem List   Diagnosis Code    Cubital tunnel syndrome on left G56.22    Cellulitis of lower extremity L03.119    Leg edema, left R60.0    Seizure disorder (HCC) G40.909    Opioid abuse ([address] F11.10    Elevated LFTs R79.89    Acute encephalopathy G93.40    Pneumonia J18.9    Community acquired pneumonia J18.9    Acute respiratory failure with hypoxia ([address] J96.01    COPD exacerbation ([address] J44.1    Hyponatremia E87.1    Streptococcal bacteremia R78.81, B95.5    Cirrhosis of liver with ascites ([facility] K74.60, R18.8       Isolation/Infection:   Isolation          No Isolation        Patient Infection Status     Infection Onset Added Last Indicated Last Indicated By Review Planned Expiration Resolved Resolved By    None active    Resolved    COVID-19 Rule Out 10/19/20 10/19/20 10/19/20 COVID-19 (Ordered)   10/20/20 Shagufta Moreno, RN    Per Dr Lashonda Rollins and Dr Viola Adler 10/15/20 10/15/20 10/15/20 COVID-19 (Ordered)   10/15/20 Rule-Out Test Resulted          Nurse Assessment:  Last Vital Signs: /62   Pulse 92   Temp 98.5 °F (36.9 °C) (Oral)   Resp 20   Ht 5' 6\" (1.676 m)   Wt 127 lb 9.6 oz (57.9 kg)   LMP 11/12/2012   SpO2 92%   BMI 20.60 kg/m²     Last documented pain score (0-10 scale): Pain Level: 10  Last Weight:   Wt Readings from Last 1 Encounters:   10/15/20 127 lb 9.6 oz (57.9 kg)     Mental Status:  oriented    IV Access:  - None    Nursing Mobility/ADLs:  Walking   Independent  Transfer  Independent  Bathing  Independent  Dressing  Independent  Toileting  Independent  Feeding  Independent  Med Admin  Independent  Med Delivery   none    Wound Care Documentation and Therapy:        Elimination:  Continence:   · Bowel: No  · Bladder: No  Urinary Catheter: None   Colostomy/Ileostomy/Ileal Conduit: No       Date of Last BM:10/22/20    Intake/Output Summary (Last 24 hours) at 10/23/2020 1029  Last data filed at 10/23/2020 0959  Gross per 24 hour   Intake 1210 ml   Output --   Net 1210 ml     I/O last 3 completed shifts: In: 36 [P.O.:720;  I.V.:10]  Out: -     Safety Concerns:     None    Impairments/Disabilities:      None    Nutrition Therapy:  Current Nutrition Therapy:   - Oral Diet:  General    Routes of Feeding: Oral  Liquids: No Liquids  Daily Fluid Restriction: no  Last Modified Barium Swallow with Video (Video Swallowing Test): not done    Treatments at the Time of Hospital Discharge:   Respiratory Treatments: ***  Oxygen Therapy:  is not on home oxygen

## 2020-10-23 NOTE — PROGRESS NOTES
Patient discharged to home. Expressed full understanding of discharge instructions both verbally and written. 4 prescriptions sent with patient along with all belongings. RA Sat= 95%. Patient left via w/c via car accompanied by spouse.

## 2020-10-23 NOTE — CARE COORDINATION
DISCHARGE ORDER  Date/Time 10/23/2020 10:23 AM  Completed by: Kusum Hinkle, Case Management    Patient Name: Heriberto Dominguez      : 1960  Admitting Diagnosis: Pneumonia [J18.9]  PNA (pneumonia) [J18.9]  PNA (pneumonia) [J18.9]      Admit order Date and Status: 10/15/20 inpt  (verify MD's last order for status of admission)      Noted discharge order. If applicable PT/OT recommendation at Discharge: N/A  DME recommendation by PT/OT:  Confirmed discharge plan  : Yes  with whom patient  If pt confirmed DC plan does family need to be contacted by CM No   Discharge Plan: Order for dc noted. Spoke at bedside with pt who cont plan for home. Discussed HHC and pt declines. Noted pt on RA and O2 sat 90% on RA with activity. Chart reviewed and no other dc needs identified. Reviewed chart. Role of discharge planner explained and patient verbalized understanding. Discharge order is noted. Has Home O2 in place on admit:  No  Informed of need to bring portable home O2 tank on day of discharge for nursing to connect prior to leaving:   Not Indicated  Verbalized agreement/Understanding:   Not Indicated  Pt is being d/c'd to home today. Pt's O2 sats are 90% on RA. Discharge timeout done with  Nsg, CM and pt. All discharge needs and concerns addressed.

## 2020-11-03 NOTE — ED NOTES
Pt is writhing on bed requesting more pain medication. Dr. Griselda Canton aware.       Ellis Harmon, RN  11/03/20 9813

## 2020-11-03 NOTE — TELEPHONE ENCOUNTER
RN Access contacted [employer] [facility] office to schedule ED f/u appt. Spoke with Kathy Dutton, she scheduled appt for Wednesday 11-4 @10:30am with Kya ARCINIEGA).

## 2020-11-03 NOTE — ED PROVIDER NOTES
Emergency Department Encounter  Location: [place] [address] ED  Open Note Time:  7:33 AM    Patient: Noelle Ku  MRN: [id number]  : 1960  Date of evaluation: 11/3/2020  ED Provider: Dave Rothman MD    7:33 AM  Noelle Ku was checked out to me by Dr. Madonna Monroe. Please see his/her initial documentation for details of the patient's initial ED presentation, physical exam and completed studies. In brief, Noelle Ku is a 64 y.o. female who presented to the emergency department abdominal pain and abdominal distention. .    Current studies pending and/or plan: Paracentesis    I wore goggles, surgical mask, N95 mask and gloves when I evaluated the patient. Physical Exam  Vitals signs and nursing note reviewed. Constitutional:       General: She is not in acute distress. Appearance: She is well-developed. She is not ill-appearing, toxic-appearing or diaphoretic. HENT:      Head: Normocephalic and atraumatic. Cardiovascular:      Rate and Rhythm: Normal rate and regular rhythm. Heart sounds: Normal heart sounds. No murmur. No friction rub. No gallop. Pulmonary:      Effort: Pulmonary effort is normal. No respiratory distress. Breath sounds: Normal breath sounds. No stridor. No wheezing, rhonchi or rales. Abdominal:      General: Bowel sounds are decreased. There is distension. Palpations: Abdomen is soft. There is fluid wave. Tenderness: There is no abdominal tenderness. Hernia: No hernia is present. Skin:     General: Skin is warm and dry. Coloration: Skin is pale. Neurological:      General: No focal deficit present. Mental Status: She is alert.    Psychiatric:         Mood and Affect: Mood normal.     I have reviewed and interpreted all of the currently available lab results and diagnostics from this visit:      Procedures/interventions/images ordered for this visit  Orders Placed This Encounter   Procedures    CT ABDOMEN PELVIS W IV CONTRAST Additional Contrast? None    US GUIDED PARACENTESIS    CBC Auto Differential    Comprehensive Metabolic Panel w/ Reflex to MG    APTT    Protime-INR    Urinalysis Reflex to Culture    Lactic Acid, Plasma    Magnesium    Ammonia       Medications ordered for this visit  Orders Placed This Encounter   Medications    morphine sulfate (PF) injection 4 mg    ondansetron (ZOFRAN) injection 4 mg    iopamidol (ISOVUE-370) 76 % injection 75 mL       LABS  Results for orders placed or performed during the hospital encounter of 11/03/20   Comprehensive Metabolic Panel w/ Reflex to MG   Result Value Ref Range    Sodium 133 (L) 136 - 145 mmol/L    Potassium reflex Magnesium 3.5 3.5 - 5.1 mmol/L    Chloride 100 99 - 110 mmol/L    CO2 26 21 - 32 mmol/L    Anion Gap 7 3 - 16    Glucose 113 (H) 70 - 99 mg/dL    BUN 13 7 - 20 mg/dL    CREATININE <0.5 (L) 0.6 - 1.1 mg/dL    GFR Non-African American >60 >60    GFR African American >60 >60    Calcium 8.3 8.3 - 10.6 mg/dL    Total Protein 6.6 6.4 - 8.2 g/dL    Alb 2.8 (L) 3.4 - 5.0 g/dL    Albumin/Globulin Ratio 0.7 (L) 1.1 - 2.2    Total Bilirubin 1.4 (H) 0.0 - 1.0 mg/dL    Alkaline Phosphatase 168 (H) 40 - 129 U/L    ALT 58 (H) 10 - 40 U/L    AST 96 (H) 15 - 37 U/L    Globulin 3.8 g/dL   APTT   Result Value Ref Range    aPTT 30.7 24.2 - 36.2 sec   Protime-INR   Result Value Ref Range    Protime 12.9 10.0 - 13.2 sec    INR 1.11 0.86 - 1.14   Lactic Acid, Plasma   Result Value Ref Range    Lactic Acid 1.5 0.4 - 2.0 mmol/L   Magnesium   Result Value Ref Range    Magnesium 1.90 1.80 - 2.40 mg/dL       IMAGING  Ct Abdomen Pelvis W Iv Contrast Additional Contrast? None    Result Date: 11/3/2020  EXAMINATION: CT OF THE ABDOMEN AND PELVIS WITH CONTRAST 11/3/2020 7:02 am TECHNIQUE: CT of the abdomen and pelvis was performed with the administration of intravenous contrast. Multiplanar reformatted images are provided for review.  Dose modulation, iterative reconstruction, and/or weight based adjustment of the mA/kV was utilized to reduce the radiation dose to as low as reasonably achievable. COMPARISON: 01/26/2019 HISTORY: ORDERING SYSTEM PROVIDED HISTORY: generalized abdominal pain TECHNOLOGIST PROVIDED HISTORY: Reason for exam:->generalized abdominal pain Additional Contrast?->None Reason for Exam: (EMS reports pt with abd distention and c/o abd pain. Pt with hx of hepatitis B and C. Pt tells ems she need to come to hosptal so \"they can get some of the fluid off of her\". PT states \"I have 14 things wrong with me\". ) Acuity: Unknown Type of Exam: Unknown Relevant Medical/Surgical History:  recurrent ascites with cirrhosis, ULTRASOUND GUIDED PARACENTESIS FINDINGS: Lower Chest: Moderate size right pleural effusion with dependent atelectasis. Organs: Morphologic findings of cirrhosis and portal hypertension are demonstrated. Para-esophageal, perigastric and perisplenic varices are noted. Small recanalized periumbilical vein. Normal size spleen. No suspicious liver lesion identified. The appendix things and portal veins are appropriately opacified. The pancreas, spleen, adrenals and kidneys reveal no acute findings. GI/Bowel: No evidence for bowel obstruction or inflammatory change. Pelvis: No acute findings. Peritoneum/Retroperitoneum: Large volume ascites. No free air. Small periportal lymph nodes are noted, likely related to underlying liver disease. Bones/Soft Tissues: No acute osseous or acute soft tissue abnormality identified. Degenerative change and Schmorl's node involving the superior endplate of U89 noted. Status post midline incision. Mild body wall edema. 1.  Morphologic findings of cirrhosis and portal hypertension are demonstrated with varices and large volume abdominopelvic ascites. 2.  Moderate size right pleural effusion and dependent atelectasis.        Final ED Course and MDM:    ED course notes for this visit       Patient did confirm that she has a primary care physician to follow-up however she has never been established with a GI specialist.  I encouraged her to see her primary care physician as well as get herself established with a GI specialist as they can start making arrangements for regular paracentesis in order to avoid further ER visits. Patient does have slightly elevated LFTs however they are within her previous lab values. I reevaluated the patient she had no abdominal pain, no tenderness to palpation with abdominal palpation. Patient is eager to be discharged and is asking for something to eat. This is a very pleasant patient with abdominal pain without significant evidence of toxicity, shock, sepsis, hemodynamic or cardiopulmonary instability, acute appendicitis, acute cholecystitis, AAA, bowel obstruction, bowel perforation, herpes zoster, a cardiac or pulmonary etiology as a cause for the abdominal symptoms, or any disease process requiring other immediate surgical or medical intervention at this time. After treatment in the ER, patient had improvement of their symptoms. On repeat physical exam, patient has a benign abdominal exam.  Pain management and follow-up plan were discussed with the patient. It is understood that if the patient is not improving as expected or if other new symptoms or signs of concern develop, other etiologies or diagnoses may need to be considered requiring other tests, treatments, consultations, and/or admission. The diagnosis, plan, expected course, follow-up, and return precautions were discussed and all questions were answered. Final Impression    1. Cirrhosis of liver with ascites, unspecified hepatic cirrhosis type (HCC)    2. Abdominal distension    3. S/P abdominal paracentesis        Blood pressure 115/72, pulse 84, temperature 98.5 °F (36.9 °C), temperature source Oral, resp. rate 22, height 5' 6\" (1.676 m), weight 150 lb (68 kg), last menstrual period 11/12/2012, SpO2 95 %.     Disposition  At this point I do not feel the patient requires further work up and it is reasonable to discharge the patient. I had a discussion with the patient and/or their surrogate regarding diagnosis, diagnostic testing results, treatment/ plan of care, and follow up. Patient and/or companions verbalized understanding of the ED workup, any relevant findings as well as any incidental findings, and the disposition and plan. There was shared decision-making between myself as well as the patient and/or their surrogate and we are all in agreement with discharge home. There was an opportunity for questions and all questions were answered to the best of my ability and to the satisfaction of the patient and/or patient family. Patient agreed to follow upas recommend for further evaluation/treatment. The patient was given strict return precautions as we discussed symptoms that would necessitate return to the ED. Patient will return to ED for new/worsening symptoms. The patient verbalized their understanding and agreement with the above plan. Please refer to AVS for further details regarding discharge instructions. Patient was given scripts for the following medications. I counseled patient how to take these medications. New Prescriptions    No medications on file       Pt is in stable condition upon Discharge to home. The note was completed using Dragon voice recognition transcription. Every effort was made to ensure accuracy; however, inadvertent transcription errors may be present despite my best efforts to edit errors.     Brad Blackmon MD  [address] [place] [address]        Brad Blackmon MD  11/03/20 8617

## 2020-11-03 NOTE — ED PROVIDER NOTES
[address] ED  EMERGENCYDEPARTMENT ENCOUNTER      Pt Name: Valeria Lombardo  MRN: [id number]  [place] 1960  Date of evaluation: 11/3/2020  Mahsa Patino MD    CHIEF COMPLAINT       Chief Complaint   Patient presents with    Abdominal Pain     EMS reports pt with abd distention and c/o abd pain. Pt with hx of hepatitis B and C. Pt tells ems she need to come to hosptal so \"they can get some of the fluid off of her\". PT states \"I have 14 things wrong with me\". HISTORY OF PRESENT ILLNESS   (Location/Symptom, Timing/Onset,Context/Setting, Quality, Duration, Modifying Factors, Severity)  Note limiting factors. Valeria Lombardo is a 64 y.o. female with history of seizure disorder, opiate abuse, alcoholic cirrhosis of the liver, hep B/C, COPD, back pain who presents to the emergency department for abdominal pain. Patient reports abdominal pain that started during last hospitalization 'when my stomach started swelling up.' patient states they removed 1.5L States she had the abdominal pain prior to paracentesis, distension and pain improved after the procedure, however returned soon after. States she told the hospitalist about the pain prior to discharge, however however was told to follow up with pcp.   -Patient reports abdominal distension is new for her and the reason she was admitted. Was not distended before then. Denies fever, emesis, cp, sob. Reports nausea, diarrhea     HPI    Nursing Notes were reviewed. REVIEW OF SYSTEMS    (2-9 systems for level 4, 10 or more for level 5)     Review of Systems   Constitutional: Negative for activity change, appetite change, diaphoresis and fever. HENT: Negative for congestion, rhinorrhea, sore throat and trouble swallowing. Respiratory: Negative for cough, chest tightness, shortness of breath, wheezing and stridor. Cardiovascular: Positive for leg swelling. Negative for chest pain and palpitations.    Gastrointestinal: Positive for abdominal pain, diarrhea and nausea. Negative for vomiting. Genitourinary: Negative for dysuria and flank pain. Skin: Negative for rash and wound. Neurological: Negative for dizziness, weakness, light-headedness, numbness and headaches. Except as noted above the remainder of the review of systems was reviewedand negative. PAST MEDICAL HISTORY     Past Medical History:   Diagnosis Date    Back pain     COPD [facility]chronic obstructive pulmonary disease) ([facility] [address]     Hepatitis B     Hepatitis C antibody positive in blood 2018    Seizures (HCC)          SURGICAL HISTORY       Past Surgical History:   Procedure Laterality Date    APPENDECTOMY      CARPAL TUNNEL RELEASE       SECTION      ROTATOR CUFF REPAIR Right     SKIN GRAFT      TUBAL LIGATION           CURRENT MEDICATIONS       Previous Medications    ALBUTEROL SULFATE HFA (VENTOLIN HFA) 108 (90 BASE) MCG/ACT INHALER    Inhale 2 puffs into the lungs 4 times daily as needed for Wheezing    FUROSEMIDE (LASIX) 20 MG TABLET    Take 1 tablet by mouth 2 times daily    GABAPENTIN PO    Take 800 mg by mouth 3 times daily Unsure of dosage     QUETIAPINE FUMARATE (SEROQUEL PO)    Take 100 mg by mouth nightly        ALLERGIES     Flexeril [cyclobenzaprine]; Ultram [tramadol hcl]; and Robaxin [methocarbamol]    FAMILY HISTORY     History reviewed. No pertinent family history. SOCIAL HISTORY       Social History     Socioeconomic History    Marital status:      Spouse name: None    Number of children: None    Years of education: None    Highest education level: None   Occupational History    None   Social Needs    Financial resource strain: None    Food insecurity     Worry: None     Inability: None    Transportation needs     Medical: None     Non-medical: None   Tobacco Use    Smoking status: Current Every Day Smoker     Packs/day: 1.00    Smokeless tobacco: Never Used   Substance and Sexual Activity    Alcohol use:  No  Drug use: Yes     Types: Methamphetamines, Marijuana    Sexual activity: None   Lifestyle    Physical activity     Days per week: None     Minutes per session: None    Stress: None   Relationships    Social connections     Talks on phone: None     Gets together: None     Attends Worship service: None     Active member of club or organization: None     Attends meetings of clubs or organizations: None     Relationship status: None    Intimate partner violence     Fear of current or ex partner: None     Emotionally abused: None     Physically abused: None     Forced sexual activity: None   Other Topics Concern    None   Social History Narrative    None       SCREENINGS    [place] Coma Scale  Eye Opening: Spontaneous  Best Verbal Response: Oriented  Best Motor Response: Obeys commands  [place] Coma Scale Score: 15        PHYSICAL EXAM    (up to 7 for level 4, 8 ormore for level 5)     ED Triage Vitals   BP Temp Temp src Pulse Resp SpO2 Height Weight   -- -- -- -- -- -- -- --       Physical Exam  Constitutional:       General: She is not in acute distress. Appearance: Normal appearance. She is well-developed. She is not ill-appearing or toxic-appearing. Comments: Laying in bed, moaning in pain, frequently requesting pain medication during evaluation. HENT:      Head: Normocephalic and atraumatic. Eyes:      Conjunctiva/sclera: Conjunctivae normal.      Pupils: Pupils are equal, round, and reactive to light. Neck:      Musculoskeletal: Normal range of motion and neck supple. Cardiovascular:      Rate and Rhythm: Normal rate and regular rhythm. Heart sounds: Normal heart sounds. No murmur. No friction rub. No gallop. Pulmonary:      Effort: Pulmonary effort is normal. No respiratory distress. Breath sounds: Normal breath sounds. No decreased breath sounds, wheezing, rhonchi or rales. Abdominal:      General: Abdomen is protuberant. Bowel sounds are normal. There is distension. Palpations: Abdomen is soft. Tenderness: There is generalized abdominal tenderness. There is no guarding or rebound. Musculoskeletal: Normal range of motion. General: No tenderness or deformity. Right lower le+ Pitting Edema present. Left lower le+ Pitting Edema present. Skin:     General: Skin is warm and dry. Findings: No rash. Neurological:      Mental Status: She is alert and oriented to person, place, and time. GCS: GCS eye subscore is 4. GCS verbal subscore is 5. GCS motor subscore is 6. Psychiatric:         Behavior: Behavior is cooperative.          DIAGNOSTIC RESULTS     EKG: All EKG's are interpreted by the Emergency Department Physicianwho either signs or Co-signs this chart in the absence of a cardiologist.      RADIOLOGY:   Non-plain film images such as CT, Ultrasound and MRI are read by the radiologist. Plain radiographic images are visualized and preliminarily interpreted by the emergency physician with the below findings:      Interpretation per the Radiologist below, if available at the time of this note:    CT ABDOMEN PELVIS W IV CONTRAST Additional Contrast? None    (Results Pending)         ED BEDSIDE ULTRASOUND:   Performed by ED Physician - none    LABS:  Labs Reviewed   COMPREHENSIVE METABOLIC PANEL W/ REFLEX TO MG FOR LOW K - Abnormal; Notable for the following components:       Result Value    Sodium 133 (*)     Glucose 113 (*)     CREATININE <0.5 (*)     Alb 2.8 (*)     Albumin/Globulin Ratio 0.7 (*)     Total Bilirubin 1.4 (*)     Alkaline Phosphatase 168 (*)     ALT 58 (*)     AST 96 (*)     All other components within normal limits    Narrative:     Performed at:  [facility],  [place], [facility]   Phone [phone]   LACTIC ACID, PLASMA    Narrative:     Performed at:  [facility],  [place], [facility]   Phone [phone] MAGNESIUM    Narrative:     Performed at:  [facility]  [address],  [place], [facility]   Phone [phone]   CBC WITH AUTO DIFFERENTIAL   APTT   PROTIME-INR   URINE RT REFLEX TO CULTURE       All other labs were within normal range ornot returned as of this dictation. EMERGENCY DEPARTMENT COURSE and DIFFERENTIAL DIAGNOSIS/MDM:   Vitals:    Vitals:    11/03/20 0606   BP: 111/73   Pulse: 78   Resp: 16   Temp: 98.5 °F (36.9 °C)   TempSrc: Oral   SpO2: 99%   Weight: 150 lb (68 kg)   Height: 5' 6\" (1.676 m)         MDM    ED COURSE/MDM    -Jeanne Conklin is a 64 y.o. female with a history of seizure disorder, opiate abuse, alcoholic cirrhosis of the liver, hep B/C, COPD, back pain who presents to ED for abdominal distention and pain. My evaluation patient laying moaning and requesting pain medications multiple times.  -Of note patient was admitted 10/15 and discharged 10/23 for shortness of breath and she did for pneumonia. She was treated with Rocephin and Zithromax and DC'd on Levaquin. Also evaluated for cirrhosis and decompensated with ascites had paracentesis (600ml removed), thoracentesis (450)  for evaluation of infection without any evidence of such. Patient was discharged with Lasix 20 mg twice daily. Per chart review it appears as the patient displayed some pain seeking behavior.  -Patient was given 4 mg morphine and Zofran  -CBC pretty unremarkable as compared to prior that was done on 10/23/2020. LFTs demonstrate mildly increased alk phos, ALT AST with increase in bilirubin from 0.5-1.4.  -Rest of the lab work is still pending as well as CT abdomen pelvis upon handoff to Dr. Lam Vela      Unchanged as noted above    [address] time was 0 minutes, excluding separately reportableprocedures.   There was a high probability of clinicallysignificant/life threatening deterioration in the patient's condition which required my urgent intervention. CONSULTS:  None    PROCEDURES:  Unless otherwise noted below, none     Procedures    FINAL IMPRESSION      1. Cirrhosis of liver with ascites, unspecified hepatic cirrhosis type ([facility] [address]    2. Abdominal distension          DISPOSITION/PLAN   DISPOSITION        PATIENT REFERREDTO:  No follow-up provider specified.     DISCHARGE MEDICATIONS:  New Prescriptions    No medications on file          (Please note that portions of this note were completed with a voice recognition program.  Efforts were made to edit the dictations but occasionally wordsare mis-transcribed.)    Tami Keller MD (electronically signed)  Attending Emergency Physician            Tami Keller MD  11/03/20 9221

## 2020-11-03 NOTE — TELEPHONE ENCOUNTER
RN Access attempted to contact pt to notify her of ED f/u appt. Attempt unsuccessful. Unable to leave voicemail.

## 2020-11-03 NOTE — ED NOTES
Saline lock removed intact. IV site looked unremarkable. Pressure dressing applied. Discharge instructions reviewed with Ms. Raeann Lazo. She verbalized understanding. Copy of discharge instructions and prescriptions given. She was advised not to drive, drink ETOH, operate machinery, or supervise small children after receiving pain medications here in the ED or while taking pain medications at home. She verbalized understanding. Ms. Raeann Lazo was discharged to home in good condition per personal vehicle, friend/family driving. She exited the ED without difficulty.  Xavier Bowers RN  11/03/20 101

## 2020-11-04 NOTE — TELEPHONE ENCOUNTER
Patient calling to schedule hospital follow up. Was discharged from [facility] on 11/3/20 with no indication on when she should follow up. Patient is requesting to be seen sooner than later. Patient has appt with Dr. David Rosenberg on 11/10. Last seen in hosp by Erickson on 10/22/20  ASSESSMENT:  · Acute hypoxemic respiratory failure, requiring up to 6 L in the emergency department  · Community acquired pneumonia   · COPD with exacerbation  · Strep viridians bacteremia  · R> Left pleural effusion: s/p thora 10/21 with 450cc out trasnudative  · Chest pain may be related to recent fall during seizure.    · Paraseptal emphysema  · ESLD with ascites s/p paracentesis 10/19/20  · H/O THC, opiate and amphetamine abuse  · H/O epilepsy     PLAN:  · Supplemental oxygen to maintain SaO2 >92%; wean as tolerated    · Prednisone taper  · Inhaled bronchodilators   · Ceftriaxone D#6 and completed and azithromycin D#5  · Repeat paracentesis planned today  · F/u pleural fluid labs  · Outpatient PFT   · Repeat chest ct in 2-3 months  · DC planning ok with pulm

## 2020-11-10 NOTE — TELEPHONE ENCOUNTER
Pt returned call and scheduled appt for 11/25/20 for IN OFFICE. Pt states that she does not have a reliable phone to use for a VV.

## 2020-11-10 NOTE — TELEPHONE ENCOUNTER
Called pt on mobile number, VM not set up. Called home number listed and male answered, L/M asking for pt to return call to office. Can offer held appt on 11/25/20.

## 2020-11-25 NOTE — PROGRESS NOTES
Pulmonary Follow-up Note  Chief Complaint: f/u COPD/hospital/ESLD  Referring Provider: hospital f/u    Interval history: she is back to her pre-morbid condition, no increased shortness of breath     Presenting history: 10/16/20 This is a 82-year-old female with a history of COPD and hepatitis C who presented to the emergency department on 10/15/2020 with a 2-day history of severe chest congestion, associated with cough and diffuse rib and chest discomfort and shortness of breath. This began after she had seizure and injured her anterior chest wall, making coughing very painful. No similar episode in the past.  She is feeling better after treatment with antibiotics. Of note, patient was scheduled to see Dr. Bridgette Lynn in the pulmonary clinic in August and did not keep appointment. reports that she quit smoking about 2 months ago. She has a 40.00 pack-year smoking history. She has never used smokeless tobacco.     Review of Systems:    Physical Exam:  Last menstrual period 11/12/2012.    Phone     Data:  Chest CT 10/19/20:   Bilateral pleural effusions, right greater than left with adjacent    consolidation the lung bases, either due to atelectasis or pneumonia         Scattered patchy ground-glass opacities are seen in the apices, left greater    than right, either due to early pneumonia or edema. Alfredito Zheng is a background of    pulmonary emphysema         Cirrhosis and moderate ascites.         ASSESSMENT:  · Recent Community acquired pneumonia   · COPD   · R> Left pleural effusion: s/p thora 10/21 with 450cc out transudative - favor hepatic hydrothorax  · Paraseptal emphysema  · ESLD with ascites s/p paracentesis 10/19/20  · H/O THC, opiate and amphetamine abuse  · H/O epilepsy     PLAN:  · Inhaled bronchodilators - has nebulizer, uses about once per day  · F/U with Gastroenterology/hepatology  · Outpatient PFT and chest ct in mid January 2021, dx COPD, abnormal imaging  · Tobacco abuse in remission    Peri Villalta

## 2021-01-01 ENCOUNTER — TELEPHONE (OUTPATIENT)
Dept: PULMONOLOGY | Age: 61
End: 2021-01-01

## 2021-01-01 ENCOUNTER — APPOINTMENT (OUTPATIENT)
Dept: GENERAL RADIOLOGY | Age: 61
DRG: 279 | End: 2021-01-01
Payer: MEDICAID

## 2021-01-01 ENCOUNTER — APPOINTMENT (OUTPATIENT)
Dept: CT IMAGING | Age: 61
DRG: 720 | End: 2021-01-01
Payer: MEDICAID

## 2021-01-01 ENCOUNTER — APPOINTMENT (OUTPATIENT)
Dept: ULTRASOUND IMAGING | Age: 61
DRG: 283 | End: 2021-01-01
Payer: MEDICAID

## 2021-01-01 ENCOUNTER — APPOINTMENT (OUTPATIENT)
Dept: GENERAL RADIOLOGY | Age: 61
DRG: 283 | End: 2021-01-01
Payer: MEDICAID

## 2021-01-01 ENCOUNTER — HOSPITAL ENCOUNTER (INPATIENT)
Age: 61
LOS: 3 days | Discharge: HOME | DRG: 283 | End: 2021-01-27
Attending: EMERGENCY MEDICINE | Admitting: INTERNAL MEDICINE
Payer: MEDICAID

## 2021-01-01 ENCOUNTER — APPOINTMENT (OUTPATIENT)
Dept: CT IMAGING | Age: 61
End: 2021-01-01
Payer: MEDICAID

## 2021-01-01 ENCOUNTER — CARE COORDINATION (OUTPATIENT)
Dept: CARE COORDINATION | Age: 61
End: 2021-01-01

## 2021-01-01 ENCOUNTER — APPOINTMENT (OUTPATIENT)
Dept: ULTRASOUND IMAGING | Age: 61
End: 2021-01-01
Payer: MEDICAID

## 2021-01-01 ENCOUNTER — APPOINTMENT (OUTPATIENT)
Dept: CT IMAGING | Age: 61
DRG: 279 | End: 2021-01-01
Payer: MEDICAID

## 2021-01-01 ENCOUNTER — APPOINTMENT (OUTPATIENT)
Dept: CT IMAGING | Age: 61
DRG: 283 | End: 2021-01-01
Payer: MEDICAID

## 2021-01-01 ENCOUNTER — HOSPITAL ENCOUNTER (INPATIENT)
Age: 61
LOS: 2 days | Discharge: HOME | DRG: 279 | End: 2021-02-04
Attending: EMERGENCY MEDICINE | Admitting: INTERNAL MEDICINE
Payer: MEDICAID

## 2021-01-01 ENCOUNTER — HOSPITAL ENCOUNTER (EMERGENCY)
Age: 61
Discharge: HOME | End: 2021-01-14
Attending: STUDENT IN AN ORGANIZED HEALTH CARE EDUCATION/TRAINING PROGRAM
Payer: MEDICAID

## 2021-01-01 ENCOUNTER — ANESTHESIA EVENT (OUTPATIENT)
Dept: ENDOSCOPY | Age: 61
DRG: 283 | End: 2021-01-01
Payer: MEDICAID

## 2021-01-01 ENCOUNTER — ANESTHESIA (OUTPATIENT)
Dept: ENDOSCOPY | Age: 61
DRG: 283 | End: 2021-01-01
Payer: MEDICAID

## 2021-01-01 ENCOUNTER — TELEPHONE (OUTPATIENT)
Dept: OTHER | Facility: CLINIC | Age: 61
End: 2021-01-01

## 2021-01-01 ENCOUNTER — APPOINTMENT (OUTPATIENT)
Dept: GENERAL RADIOLOGY | Age: 61
End: 2021-01-01
Payer: MEDICAID

## 2021-01-01 ENCOUNTER — HOSPITAL ENCOUNTER (INPATIENT)
Age: 61
LOS: 1 days | DRG: 720 | End: 2021-02-22
Attending: EMERGENCY MEDICINE | Admitting: INTERNAL MEDICINE
Payer: MEDICAID

## 2021-01-01 ENCOUNTER — APPOINTMENT (OUTPATIENT)
Dept: GENERAL RADIOLOGY | Age: 61
DRG: 720 | End: 2021-01-01
Payer: MEDICAID

## 2021-01-01 ENCOUNTER — APPOINTMENT (OUTPATIENT)
Dept: ULTRASOUND IMAGING | Age: 61
DRG: 279 | End: 2021-01-01
Payer: MEDICAID

## 2021-01-01 VITALS
BODY MASS INDEX: 21.69 KG/M2 | DIASTOLIC BLOOD PRESSURE: 41 MMHG | TEMPERATURE: 98.3 F | HEIGHT: 66 IN | OXYGEN SATURATION: 5 % | SYSTOLIC BLOOD PRESSURE: 53 MMHG | WEIGHT: 135 LBS

## 2021-01-01 VITALS
SYSTOLIC BLOOD PRESSURE: 105 MMHG | DIASTOLIC BLOOD PRESSURE: 58 MMHG | WEIGHT: 139 LBS | HEART RATE: 85 BPM | HEIGHT: 66 IN | RESPIRATION RATE: 16 BRPM | TEMPERATURE: 98.2 F | BODY MASS INDEX: 22.34 KG/M2 | OXYGEN SATURATION: 95 %

## 2021-01-01 VITALS
BODY MASS INDEX: 19.29 KG/M2 | RESPIRATION RATE: 20 BRPM | HEART RATE: 88 BPM | HEIGHT: 66 IN | SYSTOLIC BLOOD PRESSURE: 106 MMHG | OXYGEN SATURATION: 93 % | WEIGHT: 120 LBS | DIASTOLIC BLOOD PRESSURE: 69 MMHG | TEMPERATURE: 98 F

## 2021-01-01 VITALS
RESPIRATION RATE: 26 BRPM | OXYGEN SATURATION: 97 % | SYSTOLIC BLOOD PRESSURE: 114 MMHG | DIASTOLIC BLOOD PRESSURE: 65 MMHG

## 2021-01-01 VITALS
HEART RATE: 76 BPM | TEMPERATURE: 97.7 F | HEIGHT: 66 IN | OXYGEN SATURATION: 93 % | RESPIRATION RATE: 26 BRPM | BODY MASS INDEX: 20.75 KG/M2 | WEIGHT: 129.1 LBS | SYSTOLIC BLOOD PRESSURE: 99 MMHG | DIASTOLIC BLOOD PRESSURE: 67 MMHG

## 2021-01-01 DIAGNOSIS — K72.90 DECOMPENSATION OF CIRRHOSIS OF LIVER (HCC): Primary | ICD-10-CM

## 2021-01-01 DIAGNOSIS — E87.6 HYPOKALEMIA: ICD-10-CM

## 2021-01-01 DIAGNOSIS — E72.20 HYPERAMMONEMIA (HCC): ICD-10-CM

## 2021-01-01 DIAGNOSIS — R79.89 ELEVATED D-DIMER: ICD-10-CM

## 2021-01-01 DIAGNOSIS — K76.82 HEPATIC ENCEPHALOPATHY: ICD-10-CM

## 2021-01-01 DIAGNOSIS — J96.01 ACUTE RESPIRATORY FAILURE WITH HYPOXIA (HCC): Primary | ICD-10-CM

## 2021-01-01 DIAGNOSIS — R65.21 SEVERE SEPSIS WITH SEPTIC SHOCK (CODE) (HCC): ICD-10-CM

## 2021-01-01 DIAGNOSIS — N17.9 ACUTE KIDNEY INJURY (HCC): ICD-10-CM

## 2021-01-01 DIAGNOSIS — R41.82 ALTERED MENTAL STATUS, UNSPECIFIED ALTERED MENTAL STATUS TYPE: Primary | ICD-10-CM

## 2021-01-01 DIAGNOSIS — K70.31 ASCITES DUE TO ALCOHOLIC CIRRHOSIS (HCC): Primary | ICD-10-CM

## 2021-01-01 DIAGNOSIS — R10.84 GENERALIZED ABDOMINAL PAIN: ICD-10-CM

## 2021-01-01 DIAGNOSIS — J44.1 COPD EXACERBATION (HCC): ICD-10-CM

## 2021-01-01 DIAGNOSIS — K74.60 DECOMPENSATION OF CIRRHOSIS OF LIVER (HCC): Primary | ICD-10-CM

## 2021-01-01 DIAGNOSIS — K74.60 CIRRHOSIS OF LIVER WITH ASCITES, UNSPECIFIED HEPATIC CIRRHOSIS TYPE (HCC): ICD-10-CM

## 2021-01-01 DIAGNOSIS — R18.8 CIRRHOSIS OF LIVER WITH ASCITES, UNSPECIFIED HEPATIC CIRRHOSIS TYPE (HCC): ICD-10-CM

## 2021-01-01 LAB
A/G RATIO: 0.5 (ref 1.1–2.2)
A/G RATIO: 0.6 (ref 1.1–2.2)
A/G RATIO: 0.7 (ref 1.1–2.2)
A/G RATIO: 0.7 (ref 1.1–2.2)
A/G RATIO: 1.5 (ref 1.1–2.2)
ABO/RH: NORMAL
ALBUMIN FLUID: 0.3 G/DL
ALBUMIN FLUID: <0.2 G/DL
ALBUMIN FLUID: <0.2 G/DL
ALBUMIN SERPL-MCNC: 1.5 G/DL (ref 3.4–5)
ALBUMIN SERPL-MCNC: 1.9 G/DL (ref 3.4–5)
ALBUMIN SERPL-MCNC: 2.1 G/DL (ref 3.4–5)
ALBUMIN SERPL-MCNC: 2.3 G/DL (ref 3.4–5)
ALBUMIN SERPL-MCNC: 2.4 G/DL (ref 3.4–5)
ALBUMIN SERPL-MCNC: 2.5 G/DL (ref 3.4–5)
ALBUMIN SERPL-MCNC: 2.7 G/DL (ref 3.4–5)
ALP BLD-CCNC: 116 U/L (ref 40–129)
ALP BLD-CCNC: 129 U/L (ref 40–129)
ALP BLD-CCNC: 141 U/L (ref 40–129)
ALP BLD-CCNC: 147 U/L (ref 40–129)
ALP BLD-CCNC: 155 U/L (ref 40–129)
ALP BLD-CCNC: 38 U/L (ref 40–129)
ALP BLD-CCNC: 79 U/L (ref 40–129)
ALT SERPL-CCNC: 101 U/L (ref 10–40)
ALT SERPL-CCNC: 101 U/L (ref 10–40)
ALT SERPL-CCNC: 56 U/L (ref 10–40)
ALT SERPL-CCNC: 63 U/L (ref 10–40)
ALT SERPL-CCNC: 69 U/L (ref 10–40)
ALT SERPL-CCNC: 78 U/L (ref 10–40)
ALT SERPL-CCNC: 89 U/L (ref 10–40)
AMMONIA: 102 UMOL/L (ref 11–51)
AMMONIA: 31 UMOL/L (ref 11–51)
AMMONIA: 53 UMOL/L (ref 11–51)
AMMONIA: 83 UMOL/L (ref 11–51)
AMMONIA: 94 UMOL/L (ref 11–51)
AMPHETAMINE SCREEN, URINE: POSITIVE
AMPHETAMINE SCREEN, URINE: POSITIVE
AMYLASE FLUID: 25 U/L
AMYLASE FLUID: 7 U/L
ANION GAP SERPL CALCULATED.3IONS-SCNC: 17 MMOL/L (ref 3–16)
ANION GAP SERPL CALCULATED.3IONS-SCNC: 27 MMOL/L (ref 3–16)
ANION GAP SERPL CALCULATED.3IONS-SCNC: 5 MMOL/L (ref 3–16)
ANION GAP SERPL CALCULATED.3IONS-SCNC: 6 MMOL/L (ref 3–16)
ANION GAP SERPL CALCULATED.3IONS-SCNC: 8 MMOL/L (ref 3–16)
ANION GAP SERPL CALCULATED.3IONS-SCNC: 8 MMOL/L (ref 3–16)
ANION GAP SERPL CALCULATED.3IONS-SCNC: 9 MMOL/L (ref 3–16)
ANISOCYTOSIS: ABNORMAL
ANTIBODY SCREEN: NORMAL
APPEARANCE FLUID: CLEAR
APTT: 33.4 SEC (ref 24.2–36.2)
APTT: 35.7 SEC (ref 24.2–36.2)
APTT: 56.2 SEC (ref 24.2–36.2)
AST SERPL-CCNC: 116 U/L (ref 15–37)
AST SERPL-CCNC: 120 U/L (ref 15–37)
AST SERPL-CCNC: 137 U/L (ref 15–37)
AST SERPL-CCNC: 340 U/L (ref 15–37)
AST SERPL-CCNC: 60 U/L (ref 15–37)
AST SERPL-CCNC: 85 U/L (ref 15–37)
AST SERPL-CCNC: 86 U/L (ref 15–37)
ATYPICAL LYMPHOCYTE RELATIVE PERCENT: 5 % (ref 0–6)
BANDED NEUTROPHILS RELATIVE PERCENT: 4 % (ref 0–7)
BARBITURATE SCREEN URINE: ABNORMAL
BARBITURATE SCREEN URINE: ABNORMAL
BASE EXCESS ARTERIAL: -18 (ref -3–3)
BASE EXCESS ARTERIAL: -19.7 MMOL/L (ref -3–3)
BASE EXCESS ARTERIAL: -21.2 MMOL/L (ref -3–3)
BASE EXCESS ARTERIAL: -23.6 MMOL/L (ref -3–3)
BASE EXCESS ARTERIAL: -26.6 MMOL/L (ref -3–3)
BASE EXCESS ARTERIAL: 3.7 MMOL/L (ref -3–3)
BASE EXCESS VENOUS: -0.8 MMOL/L (ref -3–3)
BASE EXCESS VENOUS: -25 MMOL/L (ref -3–3)
BASOPHILS ABSOLUTE: 0 K/UL (ref 0–0.2)
BASOPHILS ABSOLUTE: 0.1 K/UL (ref 0–0.2)
BASOPHILS ABSOLUTE: 0.3 K/UL (ref 0–0.2)
BASOPHILS RELATIVE PERCENT: 0 %
BASOPHILS RELATIVE PERCENT: 0.1 %
BASOPHILS RELATIVE PERCENT: 0.3 %
BASOPHILS RELATIVE PERCENT: 0.9 %
BASOPHILS RELATIVE PERCENT: 1 %
BASOPHILS RELATIVE PERCENT: 1.1 %
BASOPHILS RELATIVE PERCENT: 3.2 %
BENZODIAZEPINE SCREEN, URINE: ABNORMAL
BENZODIAZEPINE SCREEN, URINE: ABNORMAL
BILIRUB SERPL-MCNC: 1.1 MG/DL (ref 0–1)
BILIRUB SERPL-MCNC: 1.2 MG/DL (ref 0–1)
BILIRUB SERPL-MCNC: 1.4 MG/DL (ref 0–1)
BILIRUB SERPL-MCNC: 1.6 MG/DL (ref 0–1)
BILIRUB SERPL-MCNC: 1.7 MG/DL (ref 0–1)
BILIRUB SERPL-MCNC: 1.8 MG/DL (ref 0–1)
BILIRUB SERPL-MCNC: 3 MG/DL (ref 0–1)
BILIRUBIN FLUID: 0.3 MG/DL
BILIRUBIN URINE: ABNORMAL
BILIRUBIN URINE: NEGATIVE
BLOOD CULTURE, ROUTINE: NORMAL
BLOOD CULTURE, ROUTINE: NORMAL
BLOOD, URINE: NEGATIVE
BLOOD, URINE: NEGATIVE
BODY FLUID CULTURE, STERILE: NORMAL
BUN BLDV-MCNC: 12 MG/DL (ref 7–20)
BUN BLDV-MCNC: 15 MG/DL (ref 7–20)
BUN BLDV-MCNC: 15 MG/DL (ref 7–20)
BUN BLDV-MCNC: 16 MG/DL (ref 7–20)
BUN BLDV-MCNC: 18 MG/DL (ref 7–20)
BUN BLDV-MCNC: 19 MG/DL (ref 7–20)
BUN BLDV-MCNC: 20 MG/DL (ref 7–20)
BUN BLDV-MCNC: 20 MG/DL (ref 7–20)
BUN BLDV-MCNC: 21 MG/DL (ref 7–20)
BURR CELLS: ABNORMAL
CALCIUM IONIZED: 1.13 MMOL/L (ref 1.12–1.32)
CALCIUM SERPL-MCNC: 6.4 MG/DL (ref 8.3–10.6)
CALCIUM SERPL-MCNC: 7.5 MG/DL (ref 8.3–10.6)
CALCIUM SERPL-MCNC: 7.8 MG/DL (ref 8.3–10.6)
CALCIUM SERPL-MCNC: 8.2 MG/DL (ref 8.3–10.6)
CALCIUM SERPL-MCNC: 8.3 MG/DL (ref 8.3–10.6)
CALCIUM SERPL-MCNC: 8.4 MG/DL (ref 8.3–10.6)
CALCIUM SERPL-MCNC: 8.5 MG/DL (ref 8.3–10.6)
CALCIUM SERPL-MCNC: 8.5 MG/DL (ref 8.3–10.6)
CALCIUM SERPL-MCNC: 8.7 MG/DL (ref 8.3–10.6)
CANNABINOID SCREEN URINE: POSITIVE
CANNABINOID SCREEN URINE: POSITIVE
CARBOXYHEMOGLOBIN ARTERIAL: 0.3 % (ref 0–1.5)
CARBOXYHEMOGLOBIN: 0.3 % (ref 0–1.5)
CARBOXYHEMOGLOBIN: 3.6 % (ref 0–1.5)
CELL COUNT FLUID TYPE: NORMAL
CHLORIDE BLD-SCNC: 101 MMOL/L (ref 99–110)
CHLORIDE BLD-SCNC: 101 MMOL/L (ref 99–110)
CHLORIDE BLD-SCNC: 102 MMOL/L (ref 99–110)
CHLORIDE BLD-SCNC: 102 MMOL/L (ref 99–110)
CHLORIDE BLD-SCNC: 104 MMOL/L (ref 99–110)
CHLORIDE BLD-SCNC: 91 MMOL/L (ref 99–110)
CHLORIDE BLD-SCNC: 92 MMOL/L (ref 99–110)
CHLORIDE BLD-SCNC: 96 MMOL/L (ref 99–110)
CHLORIDE BLD-SCNC: 99 MMOL/L (ref 99–110)
CHP ED QC CHECK: YES
CLARITY: ABNORMAL
CLARITY: CLEAR
CLOT EVALUATION: NORMAL
CO2: 11 MMOL/L (ref 21–32)
CO2: 14 MMOL/L (ref 21–32)
CO2: 21 MMOL/L (ref 21–32)
CO2: 23 MMOL/L (ref 21–32)
CO2: 23 MMOL/L (ref 21–32)
CO2: 25 MMOL/L (ref 21–32)
CO2: 33 MMOL/L (ref 21–32)
COCAINE METABOLITE SCREEN URINE: ABNORMAL
COCAINE METABOLITE SCREEN URINE: ABNORMAL
COLOR FLUID: NORMAL
COLOR FLUID: NORMAL
COLOR FLUID: YELLOW
COLOR: YELLOW
COLOR: YELLOW
CREAT SERPL-MCNC: 1.4 MG/DL (ref 0.6–1.1)
CREAT SERPL-MCNC: 2.1 MG/DL (ref 0.6–1.1)
CREAT SERPL-MCNC: <0.5 MG/DL (ref 0.6–1.1)
CULTURE, BLOOD 2: NORMAL
CULTURE, BLOOD 2: NORMAL
D DIMER: >5250 NG/ML DDU (ref 0–229)
EKG ATRIAL RATE: 106 BPM
EKG ATRIAL RATE: 139 BPM
EKG ATRIAL RATE: 140 BPM
EKG ATRIAL RATE: 86 BPM
EKG ATRIAL RATE: 88 BPM
EKG ATRIAL RATE: 89 BPM
EKG DIAGNOSIS: NORMAL
EKG P AXIS: 60 DEGREES
EKG P AXIS: 73 DEGREES
EKG P AXIS: 75 DEGREES
EKG P AXIS: 77 DEGREES
EKG P AXIS: 81 DEGREES
EKG P AXIS: 90 DEGREES
EKG P-R INTERVAL: 108 MS
EKG P-R INTERVAL: 116 MS
EKG P-R INTERVAL: 120 MS
EKG P-R INTERVAL: 126 MS
EKG P-R INTERVAL: 130 MS
EKG P-R INTERVAL: 80 MS
EKG Q-T INTERVAL: 268 MS
EKG Q-T INTERVAL: 292 MS
EKG Q-T INTERVAL: 334 MS
EKG Q-T INTERVAL: 360 MS
EKG Q-T INTERVAL: 370 MS
EKG Q-T INTERVAL: 426 MS
EKG QRS DURATION: 62 MS
EKG QRS DURATION: 66 MS
EKG QRS DURATION: 70 MS
EKG QRS DURATION: 70 MS
EKG QRS DURATION: 72 MS
EKG QRS DURATION: 78 MS
EKG QTC CALCULATION (BAZETT): 409 MS
EKG QTC CALCULATION (BAZETT): 430 MS
EKG QTC CALCULATION (BAZETT): 443 MS
EKG QTC CALCULATION (BAZETT): 444 MS
EKG QTC CALCULATION (BAZETT): 450 MS
EKG QTC CALCULATION (BAZETT): 515 MS
EKG R AXIS: 40 DEGREES
EKG R AXIS: 50 DEGREES
EKG R AXIS: 59 DEGREES
EKG R AXIS: 65 DEGREES
EKG R AXIS: 68 DEGREES
EKG R AXIS: 69 DEGREES
EKG T AXIS: -11 DEGREES
EKG T AXIS: -36 DEGREES
EKG T AXIS: 21 DEGREES
EKG T AXIS: 51 DEGREES
EKG T AXIS: 57 DEGREES
EKG T AXIS: 69 DEGREES
EKG VENTRICULAR RATE: 106 BPM
EKG VENTRICULAR RATE: 139 BPM
EKG VENTRICULAR RATE: 140 BPM
EKG VENTRICULAR RATE: 86 BPM
EKG VENTRICULAR RATE: 88 BPM
EKG VENTRICULAR RATE: 89 BPM
EOSINOPHILS ABSOLUTE: 0 K/UL (ref 0–0.6)
EOSINOPHILS ABSOLUTE: 0.1 K/UL (ref 0–0.6)
EOSINOPHILS ABSOLUTE: 0.2 K/UL (ref 0–0.6)
EOSINOPHILS RELATIVE PERCENT: 0 %
EOSINOPHILS RELATIVE PERCENT: 0.3 %
EOSINOPHILS RELATIVE PERCENT: 0.8 %
EOSINOPHILS RELATIVE PERCENT: 1.1 %
EOSINOPHILS RELATIVE PERCENT: 1.6 %
EOSINOPHILS RELATIVE PERCENT: 1.9 %
EOSINOPHILS RELATIVE PERCENT: 2 %
ETHANOL: NORMAL MG/DL (ref 0–0.08)
ETHANOL: NORMAL MG/DL (ref 0–0.08)
FLUID TYPE: NORMAL
GFR AFRICAN AMERICAN: 29
GFR AFRICAN AMERICAN: 47
GFR AFRICAN AMERICAN: >60
GFR NON-AFRICAN AMERICAN: 24
GFR NON-AFRICAN AMERICAN: 39
GFR NON-AFRICAN AMERICAN: >60
GLOBULIN: 1 G/DL
GLOBULIN: 2.6 G/DL
GLOBULIN: 3.8 G/DL
GLOBULIN: 4 G/DL
GLOBULIN: 4.5 G/DL
GLOBULIN: 4.5 G/DL
GLOBULIN: 4.7 G/DL
GLUCOSE BLD-MCNC: 107 MG/DL (ref 70–99)
GLUCOSE BLD-MCNC: 11 MG/DL
GLUCOSE BLD-MCNC: 11 MG/DL (ref 70–99)
GLUCOSE BLD-MCNC: 113 MG/DL (ref 70–99)
GLUCOSE BLD-MCNC: 125 MG/DL (ref 70–99)
GLUCOSE BLD-MCNC: 126 MG/DL (ref 70–99)
GLUCOSE BLD-MCNC: 133 MG/DL (ref 70–99)
GLUCOSE BLD-MCNC: 136 MG/DL (ref 70–99)
GLUCOSE BLD-MCNC: 139 MG/DL (ref 70–99)
GLUCOSE BLD-MCNC: 178 MG/DL (ref 70–99)
GLUCOSE BLD-MCNC: 214 MG/DL (ref 70–99)
GLUCOSE BLD-MCNC: 26 MG/DL (ref 70–99)
GLUCOSE BLD-MCNC: 31 MG/DL (ref 70–99)
GLUCOSE BLD-MCNC: 46 MG/DL (ref 70–99)
GLUCOSE BLD-MCNC: 522 MG/DL (ref 70–99)
GLUCOSE BLD-MCNC: 561 MG/DL (ref 70–99)
GLUCOSE BLD-MCNC: 57 MG/DL (ref 70–99)
GLUCOSE BLD-MCNC: 585 MG/DL (ref 70–99)
GLUCOSE BLD-MCNC: 59 MG/DL (ref 70–99)
GLUCOSE BLD-MCNC: 66 MG/DL (ref 70–99)
GLUCOSE BLD-MCNC: 66 MG/DL (ref 70–99)
GLUCOSE BLD-MCNC: 72 MG/DL (ref 70–99)
GLUCOSE BLD-MCNC: 77 MG/DL (ref 70–99)
GLUCOSE BLD-MCNC: 77 MG/DL (ref 70–99)
GLUCOSE BLD-MCNC: 8 MG/DL (ref 70–99)
GLUCOSE BLD-MCNC: 92 MG/DL (ref 70–99)
GLUCOSE BLD-MCNC: 93 MG/DL (ref 70–99)
GLUCOSE BLD-MCNC: 99 MG/DL (ref 70–99)
GLUCOSE BLD-MCNC: >600 MG/DL (ref 70–99)
GLUCOSE URINE: NEGATIVE MG/DL
GLUCOSE URINE: NEGATIVE MG/DL
GLUCOSE, FLUID: 133 MG/DL
GLUCOSE, FLUID: 60 MG/DL
GRAM STAIN RESULT: NORMAL
HBV SURFACE AB TITR SER: <3.5 MIU/ML
HCO3 ARTERIAL: 12.6 MMOL/L (ref 21–29)
HCO3 ARTERIAL: 15.1 MMOL/L (ref 21–29)
HCO3 ARTERIAL: 26.8 MMOL/L (ref 21–29)
HCO3 ARTERIAL: 6.3 MMOL/L (ref 21–29)
HCO3 ARTERIAL: 6.9 MMOL/L (ref 21–29)
HCO3 ARTERIAL: 9.6 MMOL/L (ref 21–29)
HCO3 VENOUS: 23.8 MMOL/L (ref 23–29)
HCO3 VENOUS: 8.1 MMOL/L (ref 23–29)
HCT VFR BLD CALC: 29.9 % (ref 36–48)
HCT VFR BLD CALC: 31.1 % (ref 36–48)
HCT VFR BLD CALC: 32 % (ref 36–48)
HCT VFR BLD CALC: 33.1 % (ref 36–48)
HCT VFR BLD CALC: 34.2 % (ref 36–48)
HCT VFR BLD CALC: 35.6 % (ref 36–48)
HCT VFR BLD CALC: 37 % (ref 36–48)
HCT VFR BLD CALC: 39 % (ref 36–48)
HCV QNT BY NAAT IU/ML: NOT DETECTED IU/ML
HCV QNT BY NAAT LOG IU/ML: NOT DETECTED LOG IU/ML
HEMATOLOGY PATH CONSULT: NORMAL
HEMATOLOGY PATH CONSULT: YES
HEMOGLOBIN, ART, EXTENDED: 10.4 G/DL (ref 12–16)
HEMOGLOBIN, ART, EXTENDED: 12.4 G/DL (ref 12–16)
HEMOGLOBIN, ART, EXTENDED: 7.7 G/DL (ref 12–16)
HEMOGLOBIN, ART, EXTENDED: 8.3 G/DL (ref 12–16)
HEMOGLOBIN, ART, EXTENDED: 9.4 G/DL (ref 12–16)
HEMOGLOBIN: 10.1 G/DL (ref 12–16)
HEMOGLOBIN: 10.3 G/DL (ref 12–16)
HEMOGLOBIN: 10.8 G/DL (ref 12–16)
HEMOGLOBIN: 11 G/DL (ref 12–16)
HEMOGLOBIN: 11.3 G/DL (ref 12–16)
HEMOGLOBIN: 12 G/DL (ref 12–16)
HEMOGLOBIN: 12.4 G/DL (ref 12–16)
HEMOGLOBIN: 13.2 G/DL (ref 12–16)
HEMOGLOBIN: 8.8 GM/DL (ref 12–16)
HEPATITIS B CORE TOTAL ANTIBODY: POSITIVE
HEPATITIS B SURFACE ANTIGEN CONFIRMATION: POSITIVE
HEPATITIS B SURFACE ANTIGEN INTERPRETATION: REACTIVE
HYPOCHROMIA: ABNORMAL
INR BLD: 1.43 (ref 0.86–1.14)
INR BLD: 1.43 (ref 0.86–1.14)
INR BLD: 1.52 (ref 0.86–1.14)
INR BLD: 3.13 (ref 0.86–1.14)
INTERPRETATION: NOT DETECTED
KETONES, URINE: NEGATIVE MG/DL
KETONES, URINE: NEGATIVE MG/DL
LACTATE: 9.41 MMOL/L (ref 0.4–2)
LACTIC ACID: 10.5 MMOL/L (ref 0.4–2)
LACTIC ACID: 2.9 MMOL/L (ref 0.4–2)
LACTIC ACID: 20.3 MMOL/L (ref 0.4–2)
LACTIC ACID: 9.6 MMOL/L (ref 0.4–2)
LD, FLUID: 55 U/L
LD, FLUID: 94 U/L
LEUKOCYTE ESTERASE, URINE: NEGATIVE
LEUKOCYTE ESTERASE, URINE: NEGATIVE
LIPASE: 45 U/L (ref 13–60)
LIPASE: 50 U/L (ref 13–60)
LYMPHOCYTES ABSOLUTE: 0.3 K/UL (ref 1–5.1)
LYMPHOCYTES ABSOLUTE: 0.5 K/UL (ref 1–5.1)
LYMPHOCYTES ABSOLUTE: 0.8 K/UL (ref 1–5.1)
LYMPHOCYTES ABSOLUTE: 1 K/UL (ref 1–5.1)
LYMPHOCYTES ABSOLUTE: 1.2 K/UL (ref 1–5.1)
LYMPHOCYTES ABSOLUTE: 1.5 K/UL (ref 1–5.1)
LYMPHOCYTES ABSOLUTE: 2.2 K/UL (ref 1–5.1)
LYMPHOCYTES RELATIVE PERCENT: 10.7 %
LYMPHOCYTES RELATIVE PERCENT: 18.4 %
LYMPHOCYTES RELATIVE PERCENT: 21 %
LYMPHOCYTES RELATIVE PERCENT: 29.7 %
LYMPHOCYTES RELATIVE PERCENT: 5.8 %
LYMPHOCYTES RELATIVE PERCENT: 9.1 %
LYMPHOCYTES RELATIVE PERCENT: 9.6 %
LYMPHOCYTES, BODY FLUID: 2 %
LYMPHOCYTES, BODY FLUID: 39 %
LYMPHOCYTES, BODY FLUID: 54 %
Lab: ABNORMAL
Lab: ABNORMAL
MACROPHAGE FLUID: 11 %
MACROPHAGE FLUID: 56 %
MAGNESIUM: 1.7 MG/DL (ref 1.8–2.4)
MCH RBC QN AUTO: 30 PG (ref 26–34)
MCH RBC QN AUTO: 30.2 PG (ref 26–34)
MCH RBC QN AUTO: 30.4 PG (ref 26–34)
MCH RBC QN AUTO: 30.8 PG (ref 26–34)
MCH RBC QN AUTO: 30.9 PG (ref 26–34)
MCH RBC QN AUTO: 31 PG (ref 26–34)
MCH RBC QN AUTO: 31.1 PG (ref 26–34)
MCH RBC QN AUTO: 31.7 PG (ref 26–34)
MCHC RBC AUTO-ENTMCNC: 33.1 G/DL (ref 31–36)
MCHC RBC AUTO-ENTMCNC: 33.5 G/DL (ref 31–36)
MCHC RBC AUTO-ENTMCNC: 33.6 G/DL (ref 31–36)
MCHC RBC AUTO-ENTMCNC: 33.7 G/DL (ref 31–36)
MCHC RBC AUTO-ENTMCNC: 33.8 G/DL (ref 31–36)
MCHC RBC AUTO-ENTMCNC: 33.9 G/DL (ref 31–36)
MCV RBC AUTO: 88.8 FL (ref 80–100)
MCV RBC AUTO: 91 FL (ref 80–100)
MCV RBC AUTO: 91.3 FL (ref 80–100)
MCV RBC AUTO: 91.9 FL (ref 80–100)
MCV RBC AUTO: 92 FL (ref 80–100)
MCV RBC AUTO: 92 FL (ref 80–100)
MCV RBC AUTO: 92.7 FL (ref 80–100)
MCV RBC AUTO: 95.8 FL (ref 80–100)
MESOTHELIAL FLUID: 2 %
METAMYELOCYTES RELATIVE PERCENT: 8 %
METHADONE SCREEN, URINE: ABNORMAL
METHADONE SCREEN, URINE: ABNORMAL
METHEMOGLOBIN ARTERIAL: 0.1 %
METHEMOGLOBIN ARTERIAL: 0.3 %
METHEMOGLOBIN VENOUS: 0.3 %
METHEMOGLOBIN VENOUS: 0.3 %
MICROSCOPIC EXAMINATION: ABNORMAL
MICROSCOPIC EXAMINATION: NORMAL
MONOCYTE, FLUID: 12 %
MONOCYTES ABSOLUTE: 0.1 K/UL (ref 0–1.3)
MONOCYTES ABSOLUTE: 0.7 K/UL (ref 0–1.3)
MONOCYTES ABSOLUTE: 0.8 K/UL (ref 0–1.3)
MONOCYTES ABSOLUTE: 1.1 K/UL (ref 0–1.3)
MONOCYTES RELATIVE PERCENT: 10 %
MONOCYTES RELATIVE PERCENT: 13 %
MONOCYTES RELATIVE PERCENT: 7 %
MONOCYTES RELATIVE PERCENT: 7.9 %
MONOCYTES RELATIVE PERCENT: 8.3 %
MONOCYTES RELATIVE PERCENT: 8.6 %
MONOCYTES RELATIVE PERCENT: 8.9 %
MYELOCYTE PERCENT: 4 %
NEUTROPHIL, FLUID: 3 %
NEUTROPHIL, FLUID: 34 %
NEUTROPHIL, FLUID: 87 %
NEUTROPHILS ABSOLUTE: 0.6 K/UL (ref 1.7–7.7)
NEUTROPHILS ABSOLUTE: 10.6 K/UL (ref 1.7–7.7)
NEUTROPHILS ABSOLUTE: 4.3 K/UL (ref 1.7–7.7)
NEUTROPHILS ABSOLUTE: 5.8 K/UL (ref 1.7–7.7)
NEUTROPHILS ABSOLUTE: 7.3 K/UL (ref 1.7–7.7)
NEUTROPHILS ABSOLUTE: 7.4 K/UL (ref 1.7–7.7)
NEUTROPHILS ABSOLUTE: 7.8 K/UL (ref 1.7–7.7)
NEUTROPHILS RELATIVE PERCENT: 43 %
NEUTROPHILS RELATIVE PERCENT: 57.8 %
NEUTROPHILS RELATIVE PERCENT: 69.8 %
NEUTROPHILS RELATIVE PERCENT: 79.5 %
NEUTROPHILS RELATIVE PERCENT: 81.7 %
NEUTROPHILS RELATIVE PERCENT: 82 %
NEUTROPHILS RELATIVE PERCENT: 82.9 %
NITRITE, URINE: NEGATIVE
NITRITE, URINE: NEGATIVE
NUCLEATED CELLS FLUID: 123 /CUMM
NUCLEATED CELLS FLUID: 565 /CUMM
NUCLEATED CELLS FLUID: 64 /CUMM
NUCLEATED RED BLOOD CELLS: 1 /100 WBC
NUMBER OF CELLS COUNTED FLUID: 100
O2 CONTENT ARTERIAL: 11 ML/DL
O2 CONTENT ARTERIAL: 11 ML/DL
O2 CONTENT ARTERIAL: 13 ML/DL
O2 CONTENT ARTERIAL: 14 ML/DL
O2 CONTENT ARTERIAL: 17 ML/DL
O2 CONTENT, VEN: 15 VOL %
O2 CONTENT, VEN: 5 VOL %
O2 SAT, ARTERIAL: 68 % (ref 93–100)
O2 SAT, ARTERIAL: 71.3 %
O2 SAT, ARTERIAL: 97.9 %
O2 SAT, ARTERIAL: 98.2 %
O2 SAT, ARTERIAL: 99.7 %
O2 SAT, ARTERIAL: 99.8 %
O2 SAT, VEN: 29 %
O2 SAT, VEN: 90 %
O2 THERAPY: ABNORMAL
OPIATE SCREEN URINE: ABNORMAL
OPIATE SCREEN URINE: ABNORMAL
OXYCODONE URINE: ABNORMAL
OXYCODONE URINE: ABNORMAL
PCO2 ARTERIAL: 32.1 MMHG (ref 35–45)
PCO2 ARTERIAL: 35.2 MMHG (ref 35–45)
PCO2 ARTERIAL: 36.1 MMHG (ref 35–45)
PCO2 ARTERIAL: 41.6 MMHG (ref 35–45)
PCO2 ARTERIAL: 75.4 MMHG (ref 35–45)
PCO2 ARTERIAL: 77.3 MM HG (ref 35–45)
PCO2, VEN: 39 MMHG (ref 40–50)
PCO2, VEN: 53.4 MMHG (ref 40–50)
PDW BLD-RTO: 17.4 % (ref 12.4–15.4)
PDW BLD-RTO: 18.1 % (ref 12.4–15.4)
PDW BLD-RTO: 18.9 % (ref 12.4–15.4)
PDW BLD-RTO: 19.3 % (ref 12.4–15.4)
PDW BLD-RTO: 20.8 % (ref 12.4–15.4)
PDW BLD-RTO: 20.8 % (ref 12.4–15.4)
PDW BLD-RTO: 20.9 % (ref 12.4–15.4)
PDW BLD-RTO: 23.3 % (ref 12.4–15.4)
PERFORMED ON: ABNORMAL
PERFORMED ON: NORMAL
PH ARTERIAL: 6.8 (ref 7.35–7.45)
PH ARTERIAL: 6.84 (ref 7.35–7.45)
PH ARTERIAL: 6.9 (ref 7.35–7.45)
PH ARTERIAL: 6.95 (ref 7.35–7.45)
PH ARTERIAL: 7.04 (ref 7.35–7.45)
PH ARTERIAL: 7.5 (ref 7.35–7.45)
PH UA: 6
PH UA: 6 (ref 5–8)
PH UA: 6.5
PH UA: 6.5 (ref 5–8)
PH VENOUS: 6.8 (ref 7.35–7.45)
PH VENOUS: 7.4 (ref 7.35–7.45)
PHENCYCLIDINE SCREEN URINE: ABNORMAL
PHENCYCLIDINE SCREEN URINE: ABNORMAL
PLATELET # BLD: 105 K/UL (ref 135–450)
PLATELET # BLD: 125 K/UL (ref 135–450)
PLATELET # BLD: 131 K/UL (ref 135–450)
PLATELET # BLD: 155 K/UL (ref 135–450)
PLATELET # BLD: 161 K/UL (ref 135–450)
PLATELET # BLD: 167 K/UL (ref 135–450)
PLATELET # BLD: 220 K/UL (ref 135–450)
PLATELET # BLD: 71 K/UL (ref 135–450)
PLATELET SLIDE REVIEW: ABNORMAL
PMV BLD AUTO: 7.5 FL (ref 5–10.5)
PMV BLD AUTO: 7.5 FL (ref 5–10.5)
PMV BLD AUTO: 7.6 FL (ref 5–10.5)
PMV BLD AUTO: 7.8 FL (ref 5–10.5)
PMV BLD AUTO: 7.8 FL (ref 5–10.5)
PMV BLD AUTO: 7.9 FL (ref 5–10.5)
PMV BLD AUTO: 8 FL (ref 5–10.5)
PMV BLD AUTO: 8.3 FL (ref 5–10.5)
PO2 ARTERIAL: 103.8 MMHG (ref 75–108)
PO2 ARTERIAL: 191.2 MMHG (ref 75–108)
PO2 ARTERIAL: 434.6 MMHG (ref 75–108)
PO2 ARTERIAL: 535.4 MMHG (ref 75–108)
PO2 ARTERIAL: 59.3 MM HG (ref 75–108)
PO2 ARTERIAL: 65.2 MMHG (ref 75–108)
PO2, VEN: 33.4 MMHG (ref 25–40)
PO2, VEN: 58.1 MMHG (ref 25–40)
POC HEMATOCRIT: 26 % (ref 36–48)
POC POTASSIUM: 3.9 MMOL/L (ref 3.5–5.1)
POC SAMPLE TYPE: ABNORMAL
POC SODIUM: 129 MMOL/L (ref 136–145)
POIKILOCYTES: ABNORMAL
POTASSIUM REFLEX MAGNESIUM: 3 MMOL/L (ref 3.5–5.1)
POTASSIUM REFLEX MAGNESIUM: 3.7 MMOL/L (ref 3.5–5.1)
POTASSIUM REFLEX MAGNESIUM: 3.9 MMOL/L (ref 3.5–5.1)
POTASSIUM REFLEX MAGNESIUM: 4 MMOL/L (ref 3.5–5.1)
POTASSIUM REFLEX MAGNESIUM: 4.2 MMOL/L (ref 3.5–5.1)
POTASSIUM REFLEX MAGNESIUM: 4.2 MMOL/L (ref 3.5–5.1)
POTASSIUM SERPL-SCNC: 3.3 MMOL/L (ref 3.5–5.1)
POTASSIUM SERPL-SCNC: 4.3 MMOL/L (ref 3.5–5.1)
POTASSIUM SERPL-SCNC: 4.3 MMOL/L (ref 3.5–5.1)
PRO-BNP: 132 PG/ML (ref 0–124)
PROCALCITONIN: 0.2 NG/ML (ref 0–0.15)
PROCALCITONIN: 0.32 NG/ML (ref 0–0.15)
PROPOXYPHENE SCREEN: ABNORMAL
PROPOXYPHENE SCREEN: ABNORMAL
PROTEIN FLUID: 0.4 G/DL
PROTEIN FLUID: 0.5 G/DL
PROTEIN FLUID: 0.6 G/DL
PROTEIN UA: NEGATIVE MG/DL
PROTEIN UA: NEGATIVE MG/DL
PROTHROMBIN TIME: 16.6 SEC (ref 10–13.2)
PROTHROMBIN TIME: 16.7 SEC (ref 10–13.2)
PROTHROMBIN TIME: 17.7 SEC (ref 10–13.2)
PROTHROMBIN TIME: 36.7 SEC (ref 10–13.2)
RBC # BLD: 3.25 M/UL (ref 4–5.2)
RBC # BLD: 3.29 M/UL (ref 4–5.2)
RBC # BLD: 3.48 M/UL (ref 4–5.2)
RBC # BLD: 3.63 M/UL (ref 4–5.2)
RBC # BLD: 3.73 M/UL (ref 4–5.2)
RBC # BLD: 3.87 M/UL (ref 4–5.2)
RBC # BLD: 3.99 M/UL (ref 4–5.2)
RBC # BLD: 4.4 M/UL (ref 4–5.2)
RBC FLUID: 200 /CUMM
RBC FLUID: 200 /CUMM
RBC FLUID: 500 /CUMM
SARS-COV-2, NAAT: NOT DETECTED
SARS-COV-2, PCR: NOT DETECTED
SLIDE REVIEW: ABNORMAL
SODIUM BLD-SCNC: 127 MMOL/L (ref 136–145)
SODIUM BLD-SCNC: 127 MMOL/L (ref 136–145)
SODIUM BLD-SCNC: 129 MMOL/L (ref 136–145)
SODIUM BLD-SCNC: 131 MMOL/L (ref 136–145)
SODIUM BLD-SCNC: 131 MMOL/L (ref 136–145)
SODIUM BLD-SCNC: 132 MMOL/L (ref 136–145)
SODIUM BLD-SCNC: 133 MMOL/L (ref 136–145)
SODIUM BLD-SCNC: 133 MMOL/L (ref 136–145)
SODIUM BLD-SCNC: 135 MMOL/L (ref 136–145)
SPECIFIC GRAVITY UA: 1.02 (ref 1–1.03)
SPECIFIC GRAVITY UA: 1.02 (ref 1–1.03)
TARGET CELLS: ABNORMAL
TCO2 ARTERIAL: 10.7 MMOL/L
TCO2 ARTERIAL: 14.9 MMOL/L
TCO2 ARTERIAL: 27.9 MMOL/L
TCO2 ARTERIAL: 7.6 MMOL/L
TCO2 ARTERIAL: 7.9 MMOL/L
TCO2 CALC VENOUS: 10 MMOL/L
TCO2 CALC VENOUS: 25 MMOL/L
TOTAL PROTEIN: 2.5 G/DL (ref 6.4–8.2)
TOTAL PROTEIN: 4.5 G/DL (ref 6.4–8.2)
TOTAL PROTEIN: 6.4 G/DL (ref 6.4–8.2)
TOTAL PROTEIN: 6.5 G/DL (ref 6.4–8.2)
TOTAL PROTEIN: 6.6 G/DL (ref 6.4–8.2)
TOTAL PROTEIN: 6.8 G/DL (ref 6.4–8.2)
TOTAL PROTEIN: 7.2 G/DL (ref 6.4–8.2)
TROPONIN: 0.04 NG/ML
TROPONIN: <0.01 NG/ML
TROPONIN: <0.01 NG/ML
URINE CULTURE, ROUTINE: NORMAL
URINE REFLEX TO CULTURE: ABNORMAL
URINE REFLEX TO CULTURE: NORMAL
URINE TYPE: ABNORMAL
URINE TYPE: NORMAL
UROBILINOGEN, URINE: 1 E.U./DL
UROBILINOGEN, URINE: 1 E.U./DL
WBC # BLD: 1.1 K/UL (ref 4–11)
WBC # BLD: 10.7 K/UL (ref 4–11)
WBC # BLD: 12.9 K/UL (ref 4–11)
WBC # BLD: 7.5 K/UL (ref 4–11)
WBC # BLD: 8.3 K/UL (ref 4–11)
WBC # BLD: 8.9 K/UL (ref 4–11)
WBC # BLD: 9.3 K/UL (ref 4–11)
WBC # BLD: 9.4 K/UL (ref 4–11)

## 2021-01-01 PROCEDURE — 82947 ASSAY GLUCOSE BLOOD QUANT: CPT

## 2021-01-01 PROCEDURE — 2500000003 HC RX 250 WO HCPCS: Performed by: INTERNAL MEDICINE

## 2021-01-01 PROCEDURE — 6370000000 HC RX 637 (ALT 250 FOR IP): Performed by: PHYSICIAN ASSISTANT

## 2021-01-01 PROCEDURE — 71045 X-RAY EXAM CHEST 1 VIEW: CPT

## 2021-01-01 PROCEDURE — 6370000000 HC RX 637 (ALT 250 FOR IP): Performed by: INTERNAL MEDICINE

## 2021-01-01 PROCEDURE — 6360000002 HC RX W HCPCS: Performed by: INTERNAL MEDICINE

## 2021-01-01 PROCEDURE — 83615 LACTATE (LD) (LDH) ENZYME: CPT

## 2021-01-01 PROCEDURE — 82803 BLOOD GASES ANY COMBINATION: CPT

## 2021-01-01 PROCEDURE — 2580000003 HC RX 258: Performed by: INTERNAL MEDICINE

## 2021-01-01 PROCEDURE — 2500000003 HC RX 250 WO HCPCS: Performed by: EMERGENCY MEDICINE

## 2021-01-01 PROCEDURE — 87341 HEP B SURFACE AG NEUTRLZJ IA: CPT

## 2021-01-01 PROCEDURE — 82140 ASSAY OF AMMONIA: CPT

## 2021-01-01 PROCEDURE — U0003 INFECTIOUS AGENT DETECTION BY NUCLEIC ACID (DNA OR RNA); SEVERE ACUTE RESPIRATORY SYNDROME CORONAVIRUS 2 (SARS-COV-2) (CORONAVIRUS DISEASE [COVID-19]), AMPLIFIED PROBE TECHNIQUE, MAKING USE OF HIGH THROUGHPUT TECHNOLOGIES AS DESCRIBED BY CMS-2020-01-R: HCPCS

## 2021-01-01 PROCEDURE — 85025 COMPLETE CBC W/AUTO DIFF WBC: CPT

## 2021-01-01 PROCEDURE — 87522 HEPATITIS C REVRS TRNSCRPJ: CPT

## 2021-01-01 PROCEDURE — 36415 COLL VENOUS BLD VENIPUNCTURE: CPT

## 2021-01-01 PROCEDURE — 2580000003 HC RX 258: Performed by: PHYSICIAN ASSISTANT

## 2021-01-01 PROCEDURE — 94761 N-INVAS EAR/PLS OXIMETRY MLT: CPT

## 2021-01-01 PROCEDURE — 80053 COMPREHEN METABOLIC PANEL: CPT

## 2021-01-01 PROCEDURE — 2580000003 HC RX 258: Performed by: EMERGENCY MEDICINE

## 2021-01-01 PROCEDURE — 2500000003 HC RX 250 WO HCPCS: Performed by: PHYSICIAN ASSISTANT

## 2021-01-01 PROCEDURE — 2580000003 HC RX 258

## 2021-01-01 PROCEDURE — 93010 ELECTROCARDIOGRAM REPORT: CPT | Performed by: INTERNAL MEDICINE

## 2021-01-01 PROCEDURE — 94003 VENT MGMT INPAT SUBQ DAY: CPT

## 2021-01-01 PROCEDURE — 6360000002 HC RX W HCPCS: Performed by: PHYSICIAN ASSISTANT

## 2021-01-01 PROCEDURE — 2580000003 HC RX 258: Performed by: NURSE ANESTHETIST, CERTIFIED REGISTERED

## 2021-01-01 PROCEDURE — 99232 SBSQ HOSP IP/OBS MODERATE 35: CPT | Performed by: INTERNAL MEDICINE

## 2021-01-01 PROCEDURE — 2700000000 HC OXYGEN THERAPY PER DAY

## 2021-01-01 PROCEDURE — 74177 CT ABD & PELVIS W/CONTRAST: CPT

## 2021-01-01 PROCEDURE — 82945 GLUCOSE OTHER FLUID: CPT

## 2021-01-01 PROCEDURE — P9045 ALBUMIN (HUMAN), 5%, 250 ML: HCPCS | Performed by: INTERNAL MEDICINE

## 2021-01-01 PROCEDURE — 99233 SBSQ HOSP IP/OBS HIGH 50: CPT | Performed by: INTERNAL MEDICINE

## 2021-01-01 PROCEDURE — 6360000002 HC RX W HCPCS: Performed by: EMERGENCY MEDICINE

## 2021-01-01 PROCEDURE — 83735 ASSAY OF MAGNESIUM: CPT

## 2021-01-01 PROCEDURE — 6370000000 HC RX 637 (ALT 250 FOR IP): Performed by: STUDENT IN AN ORGANIZED HEALTH CARE EDUCATION/TRAINING PROGRAM

## 2021-01-01 PROCEDURE — 1200000000 HC SEMI PRIVATE

## 2021-01-01 PROCEDURE — 88112 CYTOPATH CELL ENHANCE TECH: CPT

## 2021-01-01 PROCEDURE — 87086 URINE CULTURE/COLONY COUNT: CPT

## 2021-01-01 PROCEDURE — 87070 CULTURE OTHR SPECIMN AEROBIC: CPT

## 2021-01-01 PROCEDURE — U0002 COVID-19 LAB TEST NON-CDC: HCPCS

## 2021-01-01 PROCEDURE — 84295 ASSAY OF SERUM SODIUM: CPT

## 2021-01-01 PROCEDURE — 84157 ASSAY OF PROTEIN OTHER: CPT

## 2021-01-01 PROCEDURE — 6370000000 HC RX 637 (ALT 250 FOR IP): Performed by: EMERGENCY MEDICINE

## 2021-01-01 PROCEDURE — 88312 SPECIAL STAINS GROUP 1: CPT

## 2021-01-01 PROCEDURE — 87205 SMEAR GRAM STAIN: CPT

## 2021-01-01 PROCEDURE — C9113 INJ PANTOPRAZOLE SODIUM, VIA: HCPCS | Performed by: PHYSICIAN ASSISTANT

## 2021-01-01 PROCEDURE — 70450 CT HEAD/BRAIN W/O DYE: CPT

## 2021-01-01 PROCEDURE — 94640 AIRWAY INHALATION TREATMENT: CPT

## 2021-01-01 PROCEDURE — 93005 ELECTROCARDIOGRAM TRACING: CPT | Performed by: EMERGENCY MEDICINE

## 2021-01-01 PROCEDURE — 83605 ASSAY OF LACTIC ACID: CPT

## 2021-01-01 PROCEDURE — 88305 TISSUE EXAM BY PATHOLOGIST: CPT

## 2021-01-01 PROCEDURE — 89051 BODY FLUID CELL COUNT: CPT

## 2021-01-01 PROCEDURE — 99223 1ST HOSP IP/OBS HIGH 75: CPT | Performed by: INTERNAL MEDICINE

## 2021-01-01 PROCEDURE — P9047 ALBUMIN (HUMAN), 25%, 50ML: HCPCS | Performed by: INTERNAL MEDICINE

## 2021-01-01 PROCEDURE — 86850 RBC ANTIBODY SCREEN: CPT

## 2021-01-01 PROCEDURE — 86704 HEP B CORE ANTIBODY TOTAL: CPT

## 2021-01-01 PROCEDURE — 85610 PROTHROMBIN TIME: CPT

## 2021-01-01 PROCEDURE — 99285 EMERGENCY DEPT VISIT HI MDM: CPT

## 2021-01-01 PROCEDURE — 83880 ASSAY OF NATRIURETIC PEPTIDE: CPT

## 2021-01-01 PROCEDURE — 96374 THER/PROPH/DIAG INJ IV PUSH: CPT

## 2021-01-01 PROCEDURE — 87150 DNA/RNA AMPLIFIED PROBE: CPT

## 2021-01-01 PROCEDURE — 96361 HYDRATE IV INFUSION ADD-ON: CPT

## 2021-01-01 PROCEDURE — 71260 CT THORAX DX C+: CPT

## 2021-01-01 PROCEDURE — 87040 BLOOD CULTURE FOR BACTERIA: CPT

## 2021-01-01 PROCEDURE — 96365 THER/PROPH/DIAG IV INF INIT: CPT

## 2021-01-01 PROCEDURE — 82042 OTHER SOURCE ALBUMIN QUAN EA: CPT

## 2021-01-01 PROCEDURE — 84484 ASSAY OF TROPONIN QUANT: CPT

## 2021-01-01 PROCEDURE — 3609012400 HC EGD TRANSORAL BIOPSY SINGLE/MULTIPLE: Performed by: INTERNAL MEDICINE

## 2021-01-01 PROCEDURE — 82330 ASSAY OF CALCIUM: CPT

## 2021-01-01 PROCEDURE — 99283 EMERGENCY DEPT VISIT LOW MDM: CPT

## 2021-01-01 PROCEDURE — 6360000002 HC RX W HCPCS: Performed by: NURSE ANESTHETIST, CERTIFIED REGISTERED

## 2021-01-01 PROCEDURE — 99223 1ST HOSP IP/OBS HIGH 75: CPT | Performed by: PHYSICIAN ASSISTANT

## 2021-01-01 PROCEDURE — 83690 ASSAY OF LIPASE: CPT

## 2021-01-01 PROCEDURE — 93005 ELECTROCARDIOGRAM TRACING: CPT | Performed by: STUDENT IN AN ORGANIZED HEALTH CARE EDUCATION/TRAINING PROGRAM

## 2021-01-01 PROCEDURE — 6360000002 HC RX W HCPCS: Performed by: STUDENT IN AN ORGANIZED HEALTH CARE EDUCATION/TRAINING PROGRAM

## 2021-01-01 PROCEDURE — 81003 URINALYSIS AUTO W/O SCOPE: CPT

## 2021-01-01 PROCEDURE — 72125 CT NECK SPINE W/O DYE: CPT

## 2021-01-01 PROCEDURE — 88342 IMHCHEM/IMCYTCHM 1ST ANTB: CPT

## 2021-01-01 PROCEDURE — 49083 ABD PARACENTESIS W/IMAGING: CPT

## 2021-01-01 PROCEDURE — 6360000004 HC RX CONTRAST MEDICATION: Performed by: EMERGENCY MEDICINE

## 2021-01-01 PROCEDURE — 0W9G3ZZ DRAINAGE OF PERITONEAL CAVITY, PERCUTANEOUS APPROACH: ICD-10-PCS | Performed by: RADIOLOGY

## 2021-01-01 PROCEDURE — 0W9G3ZZ DRAINAGE OF PERITONEAL CAVITY, PERCUTANEOUS APPROACH: ICD-10-PCS | Performed by: INTERNAL MEDICINE

## 2021-01-01 PROCEDURE — 0DB28ZX EXCISION OF MIDDLE ESOPHAGUS, VIA NATURAL OR ARTIFICIAL OPENING ENDOSCOPIC, DIAGNOSTIC: ICD-10-PCS | Performed by: INTERNAL MEDICINE

## 2021-01-01 PROCEDURE — 80048 BASIC METABOLIC PNL TOTAL CA: CPT

## 2021-01-01 PROCEDURE — 84145 PROCALCITONIN (PCT): CPT

## 2021-01-01 PROCEDURE — 96375 TX/PRO/DX INJ NEW DRUG ADDON: CPT

## 2021-01-01 PROCEDURE — 86900 BLOOD TYPING SEROLOGIC ABO: CPT

## 2021-01-01 PROCEDURE — 85730 THROMBOPLASTIN TIME PARTIAL: CPT

## 2021-01-01 PROCEDURE — 3700000001 HC ADD 15 MINUTES (ANESTHESIA): Performed by: INTERNAL MEDICINE

## 2021-01-01 PROCEDURE — 80307 DRUG TEST PRSMV CHEM ANLYZR: CPT

## 2021-01-01 PROCEDURE — C9113 INJ PANTOPRAZOLE SODIUM, VIA: HCPCS | Performed by: INTERNAL MEDICINE

## 2021-01-01 PROCEDURE — 36600 WITHDRAWAL OF ARTERIAL BLOOD: CPT

## 2021-01-01 PROCEDURE — 7100000010 HC PHASE II RECOVERY - FIRST 15 MIN: Performed by: INTERNAL MEDICINE

## 2021-01-01 PROCEDURE — 2000000000 HC ICU R&B

## 2021-01-01 PROCEDURE — 0BH17EZ INSERTION OF ENDOTRACHEAL AIRWAY INTO TRACHEA, VIA NATURAL OR ARTIFICIAL OPENING: ICD-10-PCS | Performed by: INTERNAL MEDICINE

## 2021-01-01 PROCEDURE — G0480 DRUG TEST DEF 1-7 CLASSES: HCPCS

## 2021-01-01 PROCEDURE — 94002 VENT MGMT INPAT INIT DAY: CPT

## 2021-01-01 PROCEDURE — 5A1935Z RESPIRATORY VENTILATION, LESS THAN 24 CONSECUTIVE HOURS: ICD-10-PCS | Performed by: INTERNAL MEDICINE

## 2021-01-01 PROCEDURE — 82150 ASSAY OF AMYLASE: CPT

## 2021-01-01 PROCEDURE — 86901 BLOOD TYPING SEROLOGIC RH(D): CPT

## 2021-01-01 PROCEDURE — 99255 IP/OBS CONSLTJ NEW/EST HI 80: CPT | Performed by: INTERNAL MEDICINE

## 2021-01-01 PROCEDURE — 3700000000 HC ANESTHESIA ATTENDED CARE: Performed by: INTERNAL MEDICINE

## 2021-01-01 PROCEDURE — 7100000011 HC PHASE II RECOVERY - ADDTL 15 MIN: Performed by: INTERNAL MEDICINE

## 2021-01-01 PROCEDURE — 87340 HEPATITIS B SURFACE AG IA: CPT

## 2021-01-01 PROCEDURE — 2500000003 HC RX 250 WO HCPCS

## 2021-01-01 PROCEDURE — 84132 ASSAY OF SERUM POTASSIUM: CPT

## 2021-01-01 PROCEDURE — 93005 ELECTROCARDIOGRAM TRACING: CPT | Performed by: PHYSICIAN ASSISTANT

## 2021-01-01 PROCEDURE — 82077 ASSAY SPEC XCP UR&BREATH IA: CPT

## 2021-01-01 PROCEDURE — 90686 IIV4 VACC NO PRSV 0.5 ML IM: CPT | Performed by: INTERNAL MEDICINE

## 2021-01-01 PROCEDURE — 2060000000 HC ICU INTERMEDIATE R&B

## 2021-01-01 PROCEDURE — 85379 FIBRIN DEGRADATION QUANT: CPT

## 2021-01-01 PROCEDURE — 2500000003 HC RX 250 WO HCPCS: Performed by: NURSE ANESTHETIST, CERTIFIED REGISTERED

## 2021-01-01 PROCEDURE — 86706 HEP B SURFACE ANTIBODY: CPT

## 2021-01-01 PROCEDURE — 87186 SC STD MICRODIL/AGAR DIL: CPT

## 2021-01-01 PROCEDURE — 85014 HEMATOCRIT: CPT

## 2021-01-01 PROCEDURE — 2709999900 HC NON-CHARGEABLE SUPPLY: Performed by: INTERNAL MEDICINE

## 2021-01-01 PROCEDURE — 99239 HOSP IP/OBS DSCHRG MGMT >30: CPT | Performed by: INTERNAL MEDICINE

## 2021-01-01 PROCEDURE — 0DB68ZX EXCISION OF STOMACH, VIA NATURAL OR ARTIFICIAL OPENING ENDOSCOPIC, DIAGNOSTIC: ICD-10-PCS | Performed by: INTERNAL MEDICINE

## 2021-01-01 PROCEDURE — 87077 CULTURE AEROBIC IDENTIFY: CPT

## 2021-01-01 PROCEDURE — 85027 COMPLETE CBC AUTOMATED: CPT

## 2021-01-01 PROCEDURE — 6360000004 HC RX CONTRAST MEDICATION: Performed by: STUDENT IN AN ORGANIZED HEALTH CARE EDUCATION/TRAINING PROGRAM

## 2021-01-01 PROCEDURE — G0008 ADMIN INFLUENZA VIRUS VAC: HCPCS | Performed by: INTERNAL MEDICINE

## 2021-01-01 PROCEDURE — 82247 BILIRUBIN TOTAL: CPT

## 2021-01-01 PROCEDURE — 87635 SARS-COV-2 COVID-19 AMP PRB: CPT

## 2021-01-01 RX ORDER — DEXTROSE MONOHYDRATE 25 G/50ML
INJECTION, SOLUTION INTRAVENOUS
Status: COMPLETED
Start: 2021-01-01 | End: 2021-01-01

## 2021-01-01 RX ORDER — SPIRONOLACTONE 25 MG/1
50 TABLET ORAL 2 TIMES DAILY
Status: DISCONTINUED | OUTPATIENT
Start: 2021-01-01 | End: 2021-01-01 | Stop reason: HOSPADM

## 2021-01-01 RX ORDER — SODIUM CHLORIDE 0.9 % (FLUSH) 0.9 %
10 SYRINGE (ML) INJECTION EVERY 12 HOURS SCHEDULED
Status: DISCONTINUED | OUTPATIENT
Start: 2021-01-01 | End: 2021-01-01 | Stop reason: HOSPADM

## 2021-01-01 RX ORDER — FUROSEMIDE 10 MG/ML
20 INJECTION INTRAMUSCULAR; INTRAVENOUS DAILY
Status: DISCONTINUED | OUTPATIENT
Start: 2021-01-01 | End: 2021-01-01

## 2021-01-01 RX ORDER — DEXTROSE MONOHYDRATE 25 G/50ML
25 INJECTION, SOLUTION INTRAVENOUS ONCE
Status: COMPLETED | OUTPATIENT
Start: 2021-01-01 | End: 2021-01-01

## 2021-01-01 RX ORDER — DEXTROSE MONOHYDRATE 50 MG/ML
100 INJECTION, SOLUTION INTRAVENOUS PRN
Status: DISCONTINUED | OUTPATIENT
Start: 2021-01-01 | End: 2021-01-01 | Stop reason: HOSPADM

## 2021-01-01 RX ORDER — ACETAMINOPHEN 325 MG/1
650 TABLET ORAL EVERY 6 HOURS PRN
Status: DISCONTINUED | OUTPATIENT
Start: 2021-01-01 | End: 2021-01-01 | Stop reason: HOSPADM

## 2021-01-01 RX ORDER — PREDNISONE 20 MG/1
TABLET ORAL
Qty: 9 TABLET | Refills: 0 | Status: SHIPPED | OUTPATIENT
Start: 2021-01-01

## 2021-01-01 RX ORDER — LACTULOSE 10 G/15ML
30 SOLUTION ORAL 3 TIMES DAILY
Status: DISCONTINUED | OUTPATIENT
Start: 2021-01-01 | End: 2021-01-01

## 2021-01-01 RX ORDER — 0.9 % SODIUM CHLORIDE 0.9 %
500 INTRAVENOUS SOLUTION INTRAVENOUS ONCE
Status: COMPLETED | OUTPATIENT
Start: 2021-01-01 | End: 2021-01-01

## 2021-01-01 RX ORDER — DEXTROSE MONOHYDRATE 25 G/50ML
12.5 INJECTION, SOLUTION INTRAVENOUS PRN
Status: DISCONTINUED | OUTPATIENT
Start: 2021-01-01 | End: 2021-01-01 | Stop reason: HOSPADM

## 2021-01-01 RX ORDER — SODIUM CHLORIDE 9 MG/ML
INJECTION, SOLUTION INTRAVENOUS
Status: DISPENSED
Start: 2021-01-01 | End: 2021-01-01

## 2021-01-01 RX ORDER — SODIUM CHLORIDE 9 MG/ML
INJECTION, SOLUTION INTRAVENOUS
Status: COMPLETED
Start: 2021-01-01 | End: 2021-01-01

## 2021-01-01 RX ORDER — OXYCODONE HYDROCHLORIDE 5 MG/1
5 TABLET ORAL EVERY 6 HOURS PRN
Status: DISCONTINUED | OUTPATIENT
Start: 2021-01-01 | End: 2021-01-01 | Stop reason: HOSPADM

## 2021-01-01 RX ORDER — ONDANSETRON 2 MG/ML
4 INJECTION INTRAMUSCULAR; INTRAVENOUS EVERY 6 HOURS PRN
Status: DISCONTINUED | OUTPATIENT
Start: 2021-01-01 | End: 2021-01-01 | Stop reason: HOSPADM

## 2021-01-01 RX ORDER — ACETAMINOPHEN 650 MG/1
650 SUPPOSITORY RECTAL EVERY 6 HOURS PRN
Status: DISCONTINUED | OUTPATIENT
Start: 2021-01-01 | End: 2021-01-01

## 2021-01-01 RX ORDER — ACETAMINOPHEN 650 MG/1
650 SUPPOSITORY RECTAL EVERY 6 HOURS PRN
Status: DISCONTINUED | OUTPATIENT
Start: 2021-01-01 | End: 2021-01-01 | Stop reason: HOSPADM

## 2021-01-01 RX ORDER — FUROSEMIDE 10 MG/ML
20 INJECTION INTRAMUSCULAR; INTRAVENOUS 2 TIMES DAILY
Status: DISCONTINUED | OUTPATIENT
Start: 2021-01-01 | End: 2021-01-01 | Stop reason: HOSPADM

## 2021-01-01 RX ORDER — ALBUTEROL SULFATE 2.5 MG/3ML
2.5 SOLUTION RESPIRATORY (INHALATION) EVERY 4 HOURS
Status: DISCONTINUED | OUTPATIENT
Start: 2021-01-01 | End: 2021-01-01 | Stop reason: HOSPADM

## 2021-01-01 RX ORDER — SODIUM CHLORIDE 9 MG/ML
INJECTION, SOLUTION INTRAVENOUS CONTINUOUS
Status: DISCONTINUED | OUTPATIENT
Start: 2021-01-01 | End: 2021-01-01

## 2021-01-01 RX ORDER — QUETIAPINE FUMARATE 25 MG/1
50 TABLET, FILM COATED ORAL NIGHTLY
Status: DISCONTINUED | OUTPATIENT
Start: 2021-01-01 | End: 2021-01-01 | Stop reason: HOSPADM

## 2021-01-01 RX ORDER — CEFDINIR 250 MG/5ML
600 POWDER, FOR SUSPENSION ORAL DAILY
Status: DISCONTINUED | OUTPATIENT
Start: 2021-01-01 | End: 2021-01-01 | Stop reason: HOSPADM

## 2021-01-01 RX ORDER — POLYETHYLENE GLYCOL 3350 17 G/17G
17 POWDER, FOR SOLUTION ORAL DAILY PRN
Status: DISCONTINUED | OUTPATIENT
Start: 2021-01-01 | End: 2021-01-01 | Stop reason: HOSPADM

## 2021-01-01 RX ORDER — PANTOPRAZOLE SODIUM 40 MG/10ML
40 INJECTION, POWDER, LYOPHILIZED, FOR SOLUTION INTRAVENOUS DAILY
Status: DISCONTINUED | OUTPATIENT
Start: 2021-01-01 | End: 2021-01-01 | Stop reason: HOSPADM

## 2021-01-01 RX ORDER — MIDAZOLAM HYDROCHLORIDE 1 MG/ML
2 INJECTION INTRAMUSCULAR; INTRAVENOUS EVERY 5 MIN PRN
Status: DISCONTINUED | OUTPATIENT
Start: 2021-01-01 | End: 2021-01-01 | Stop reason: HOSPADM

## 2021-01-01 RX ORDER — SODIUM CHLORIDE 0.9 % (FLUSH) 0.9 %
10 SYRINGE (ML) INJECTION PRN
Status: DISCONTINUED | OUTPATIENT
Start: 2021-01-01 | End: 2021-01-01 | Stop reason: HOSPADM

## 2021-01-01 RX ORDER — MAGNESIUM SULFATE IN WATER 40 MG/ML
2000 INJECTION, SOLUTION INTRAVENOUS PRN
Status: DISCONTINUED | OUTPATIENT
Start: 2021-01-01 | End: 2021-01-01 | Stop reason: HOSPADM

## 2021-01-01 RX ORDER — DOXYCYCLINE HYCLATE 100 MG
100 TABLET ORAL EVERY 12 HOURS SCHEDULED
Status: DISCONTINUED | OUTPATIENT
Start: 2021-01-01 | End: 2021-01-01 | Stop reason: HOSPADM

## 2021-01-01 RX ORDER — LACTULOSE 10 G/15ML
20 SOLUTION ORAL ONCE
Status: COMPLETED | OUTPATIENT
Start: 2021-01-01 | End: 2021-01-01

## 2021-01-01 RX ORDER — OXYCODONE HYDROCHLORIDE 5 MG/1
5 TABLET ORAL EVERY 8 HOURS PRN
Qty: 9 TABLET | Refills: 0 | Status: SHIPPED | OUTPATIENT
Start: 2021-01-01 | End: 2021-01-01

## 2021-01-01 RX ORDER — ALBUTEROL SULFATE 2.5 MG/3ML
2.5 SOLUTION RESPIRATORY (INHALATION) EVERY 6 HOURS PRN
Status: DISCONTINUED | OUTPATIENT
Start: 2021-01-01 | End: 2021-01-01 | Stop reason: HOSPADM

## 2021-01-01 RX ORDER — FUROSEMIDE 40 MG/1
40 TABLET ORAL DAILY
Qty: 30 TABLET | Refills: 0 | Status: SHIPPED | OUTPATIENT
Start: 2021-01-01 | End: 2021-02-26

## 2021-01-01 RX ORDER — FUROSEMIDE 40 MG/1
40 TABLET ORAL DAILY
Status: CANCELLED | OUTPATIENT
Start: 2021-01-01

## 2021-01-01 RX ORDER — SODIUM CHLORIDE, SODIUM LACTATE, POTASSIUM CHLORIDE, CALCIUM CHLORIDE 600; 310; 30; 20 MG/100ML; MG/100ML; MG/100ML; MG/100ML
INJECTION, SOLUTION INTRAVENOUS CONTINUOUS PRN
Status: DISCONTINUED | OUTPATIENT
Start: 2021-01-01 | End: 2021-01-01 | Stop reason: SDUPTHER

## 2021-01-01 RX ORDER — PROMETHAZINE HYDROCHLORIDE 25 MG/1
12.5 TABLET ORAL EVERY 6 HOURS PRN
Status: DISCONTINUED | OUTPATIENT
Start: 2021-01-01 | End: 2021-01-01 | Stop reason: HOSPADM

## 2021-01-01 RX ORDER — PREDNISONE 20 MG/1
40 TABLET ORAL DAILY
Status: DISCONTINUED | OUTPATIENT
Start: 2021-01-01 | End: 2021-01-01 | Stop reason: HOSPADM

## 2021-01-01 RX ORDER — ALBUMIN, HUMAN INJ 5% 5 %
75 SOLUTION INTRAVENOUS ONCE
Status: COMPLETED | OUTPATIENT
Start: 2021-01-01 | End: 2021-01-01

## 2021-01-01 RX ORDER — LORAZEPAM 2 MG/ML
1 INJECTION INTRAMUSCULAR ONCE
Status: COMPLETED | OUTPATIENT
Start: 2021-01-01 | End: 2021-01-01

## 2021-01-01 RX ORDER — ALBUMIN (HUMAN) 12.5 G/50ML
25 SOLUTION INTRAVENOUS EVERY 8 HOURS
Status: DISCONTINUED | OUTPATIENT
Start: 2021-01-01 | End: 2021-01-01 | Stop reason: HOSPADM

## 2021-01-01 RX ORDER — ALBUMIN (HUMAN) 12.5 G/50ML
SOLUTION INTRAVENOUS
Status: DISCONTINUED
Start: 2021-01-01 | End: 2021-01-01 | Stop reason: HOSPADM

## 2021-01-01 RX ORDER — KETOROLAC TROMETHAMINE 30 MG/ML
15 INJECTION, SOLUTION INTRAMUSCULAR; INTRAVENOUS ONCE
Status: COMPLETED | OUTPATIENT
Start: 2021-01-01 | End: 2021-01-01

## 2021-01-01 RX ORDER — 0.9 % SODIUM CHLORIDE 0.9 %
30 INTRAVENOUS SOLUTION INTRAVENOUS ONCE
Status: COMPLETED | OUTPATIENT
Start: 2021-01-01 | End: 2021-01-01

## 2021-01-01 RX ORDER — DEXTROSE MONOHYDRATE 100 MG/ML
INJECTION, SOLUTION INTRAVENOUS CONTINUOUS
Status: DISCONTINUED | OUTPATIENT
Start: 2021-01-01 | End: 2021-01-01 | Stop reason: HOSPADM

## 2021-01-01 RX ORDER — PROPOFOL 10 MG/ML
INJECTION, EMULSION INTRAVENOUS PRN
Status: DISCONTINUED | OUTPATIENT
Start: 2021-01-01 | End: 2021-01-01 | Stop reason: SDUPTHER

## 2021-01-01 RX ORDER — METHYLPREDNISOLONE SODIUM SUCCINATE 40 MG/ML
40 INJECTION, POWDER, LYOPHILIZED, FOR SOLUTION INTRAMUSCULAR; INTRAVENOUS EVERY 12 HOURS
Status: DISCONTINUED | OUTPATIENT
Start: 2021-01-01 | End: 2021-01-01

## 2021-01-01 RX ORDER — PANTOPRAZOLE SODIUM 40 MG/1
40 TABLET, DELAYED RELEASE ORAL
Status: DISCONTINUED | OUTPATIENT
Start: 2021-01-01 | End: 2021-01-01 | Stop reason: HOSPADM

## 2021-01-01 RX ORDER — LACTULOSE 10 G/15ML
15 SOLUTION ORAL 3 TIMES DAILY
Status: DISCONTINUED | OUTPATIENT
Start: 2021-01-01 | End: 2021-01-01 | Stop reason: HOSPADM

## 2021-01-01 RX ORDER — GABAPENTIN 400 MG/1
800 CAPSULE ORAL 3 TIMES DAILY
Status: DISCONTINUED | OUTPATIENT
Start: 2021-01-01 | End: 2021-01-01

## 2021-01-01 RX ORDER — DEXTROSE MONOHYDRATE 50 MG/ML
1000 INJECTION, SOLUTION INTRAVENOUS CONTINUOUS
Status: DISCONTINUED | OUTPATIENT
Start: 2021-01-01 | End: 2021-01-01

## 2021-01-01 RX ORDER — PROPOFOL 10 MG/ML
5-50 INJECTION, EMULSION INTRAVENOUS ONCE
Status: DISCONTINUED | OUTPATIENT
Start: 2021-01-01 | End: 2021-01-01 | Stop reason: HOSPADM

## 2021-01-01 RX ORDER — QUETIAPINE FUMARATE 100 MG/1
100 TABLET, FILM COATED ORAL NIGHTLY
Status: DISCONTINUED | OUTPATIENT
Start: 2021-01-01 | End: 2021-01-01

## 2021-01-01 RX ORDER — NICOTINE POLACRILEX 4 MG
15 LOZENGE BUCCAL PRN
Status: DISCONTINUED | OUTPATIENT
Start: 2021-01-01 | End: 2021-01-01 | Stop reason: HOSPADM

## 2021-01-01 RX ORDER — BUDESONIDE AND FORMOTEROL FUMARATE DIHYDRATE 80; 4.5 UG/1; UG/1
1 AEROSOL RESPIRATORY (INHALATION) 2 TIMES DAILY
Status: DISCONTINUED | OUTPATIENT
Start: 2021-01-01 | End: 2021-01-01 | Stop reason: HOSPADM

## 2021-01-01 RX ORDER — POTASSIUM CHLORIDE 7.45 MG/ML
10 INJECTION INTRAVENOUS ONCE
Status: COMPLETED | OUTPATIENT
Start: 2021-01-01 | End: 2021-01-01

## 2021-01-01 RX ORDER — ALBUMIN (HUMAN) 12.5 G/50ML
50 SOLUTION INTRAVENOUS ONCE
Status: COMPLETED | OUTPATIENT
Start: 2021-01-01 | End: 2021-01-01

## 2021-01-01 RX ORDER — POTASSIUM CHLORIDE 20 MEQ/1
40 TABLET, EXTENDED RELEASE ORAL PRN
Status: DISCONTINUED | OUTPATIENT
Start: 2021-01-01 | End: 2021-01-01 | Stop reason: HOSPADM

## 2021-01-01 RX ORDER — DEXTROSE MONOHYDRATE 50 MG/ML
INJECTION, SOLUTION INTRAVENOUS
Status: COMPLETED
Start: 2021-01-01 | End: 2021-01-01

## 2021-01-01 RX ORDER — LIDOCAINE HYDROCHLORIDE 20 MG/ML
INJECTION, SOLUTION INFILTRATION; PERINEURAL PRN
Status: DISCONTINUED | OUTPATIENT
Start: 2021-01-01 | End: 2021-01-01 | Stop reason: SDUPTHER

## 2021-01-01 RX ORDER — IPRATROPIUM BROMIDE AND ALBUTEROL SULFATE 2.5; .5 MG/3ML; MG/3ML
3 SOLUTION RESPIRATORY (INHALATION) ONCE
Status: COMPLETED | OUTPATIENT
Start: 2021-01-01 | End: 2021-01-01

## 2021-01-01 RX ORDER — POTASSIUM CHLORIDE 7.45 MG/ML
10 INJECTION INTRAVENOUS PRN
Status: DISCONTINUED | OUTPATIENT
Start: 2021-01-01 | End: 2021-01-01 | Stop reason: HOSPADM

## 2021-01-01 RX ORDER — PANTOPRAZOLE SODIUM 40 MG/1
40 TABLET, DELAYED RELEASE ORAL
Qty: 60 TABLET | Refills: 1 | Status: SHIPPED | OUTPATIENT
Start: 2021-01-01

## 2021-01-01 RX ORDER — DEXAMETHASONE SODIUM PHOSPHATE 10 MG/ML
10 INJECTION, SOLUTION INTRAMUSCULAR; INTRAVENOUS ONCE
Status: COMPLETED | OUTPATIENT
Start: 2021-01-01 | End: 2021-01-01

## 2021-01-01 RX ORDER — ACETAMINOPHEN 325 MG/1
650 TABLET ORAL EVERY 6 HOURS PRN
Status: DISCONTINUED | OUTPATIENT
Start: 2021-01-01 | End: 2021-01-01

## 2021-01-01 RX ORDER — SODIUM CHLORIDE 9 MG/ML
INJECTION, SOLUTION INTRAVENOUS CONTINUOUS
Status: DISCONTINUED | OUTPATIENT
Start: 2021-01-01 | End: 2021-01-01 | Stop reason: HOSPADM

## 2021-01-01 RX ORDER — IPRATROPIUM BROMIDE AND ALBUTEROL SULFATE 2.5; .5 MG/3ML; MG/3ML
1 SOLUTION RESPIRATORY (INHALATION)
Status: DISCONTINUED | OUTPATIENT
Start: 2021-01-01 | End: 2021-01-01 | Stop reason: HOSPADM

## 2021-01-01 RX ORDER — LACTULOSE 10 G/15ML
20 SOLUTION ORAL 3 TIMES DAILY
Status: DISCONTINUED | OUTPATIENT
Start: 2021-01-01 | End: 2021-01-01 | Stop reason: HOSPADM

## 2021-01-01 RX ORDER — MORPHINE SULFATE 10 MG/ML
10 INJECTION, SOLUTION INTRAMUSCULAR; INTRAVENOUS ONCE
Status: COMPLETED | OUTPATIENT
Start: 2021-01-01 | End: 2021-01-01

## 2021-01-01 RX ORDER — FUROSEMIDE 40 MG/1
40 TABLET ORAL DAILY
Status: DISCONTINUED | OUTPATIENT
Start: 2021-01-01 | End: 2021-01-01 | Stop reason: HOSPADM

## 2021-01-01 RX ORDER — QUETIAPINE FUMARATE 100 MG/1
100 TABLET, FILM COATED ORAL NIGHTLY
Status: DISCONTINUED | OUTPATIENT
Start: 2021-01-01 | End: 2021-01-01 | Stop reason: HOSPADM

## 2021-01-01 RX ORDER — GABAPENTIN 400 MG/1
400 CAPSULE ORAL 3 TIMES DAILY
Status: DISCONTINUED | OUTPATIENT
Start: 2021-01-01 | End: 2021-01-01 | Stop reason: HOSPADM

## 2021-01-01 RX ORDER — METHYLPREDNISOLONE SODIUM SUCCINATE 40 MG/ML
40 INJECTION, POWDER, LYOPHILIZED, FOR SOLUTION INTRAMUSCULAR; INTRAVENOUS DAILY
Status: DISCONTINUED | OUTPATIENT
Start: 2021-01-01 | End: 2021-01-01

## 2021-01-01 RX ADMIN — IPRATROPIUM BROMIDE AND ALBUTEROL SULFATE 1 AMPULE: .5; 3 SOLUTION RESPIRATORY (INHALATION) at 07:24

## 2021-01-01 RX ADMIN — OXYCODONE 5 MG: 5 TABLET ORAL at 14:15

## 2021-01-01 RX ADMIN — POTASSIUM CHLORIDE 10 MEQ: 7.46 INJECTION, SOLUTION INTRAVENOUS at 12:41

## 2021-01-01 RX ADMIN — PANTOPRAZOLE SODIUM 40 MG: 40 INJECTION, POWDER, FOR SOLUTION INTRAVENOUS at 08:22

## 2021-01-01 RX ADMIN — POTASSIUM BICARBONATE 40 MEQ: 782 TABLET, EFFERVESCENT ORAL at 16:33

## 2021-01-01 RX ADMIN — ALBUTEROL SULFATE 2.5 MG: 2.5 SOLUTION RESPIRATORY (INHALATION) at 03:40

## 2021-01-01 RX ADMIN — LACTULOSE 30 G: 10 SOLUTION ORAL at 07:50

## 2021-01-01 RX ADMIN — SPIRONOLACTONE 50 MG: 25 TABLET ORAL at 22:24

## 2021-01-01 RX ADMIN — SODIUM BICARBONATE 50 MEQ: 84 INJECTION INTRAVENOUS at 18:45

## 2021-01-01 RX ADMIN — Medication 10 ML: at 07:50

## 2021-01-01 RX ADMIN — DEXTROSE MONOHYDRATE 12.5 G: 25 INJECTION, SOLUTION INTRAVENOUS at 19:49

## 2021-01-01 RX ADMIN — Medication 1 PUFF: at 19:32

## 2021-01-01 RX ADMIN — ALBUTEROL SULFATE 2.5 MG: 2.5 SOLUTION RESPIRATORY (INHALATION) at 11:21

## 2021-01-01 RX ADMIN — SPIRONOLACTONE 50 MG: 25 TABLET ORAL at 10:49

## 2021-01-01 RX ADMIN — OXYCODONE 5 MG: 5 TABLET ORAL at 16:13

## 2021-01-01 RX ADMIN — DEXTROSE MONOHYDRATE: 100 INJECTION, SOLUTION INTRAVENOUS at 20:57

## 2021-01-01 RX ADMIN — VANCOMYCIN HYDROCHLORIDE 1250 MG: 1 INJECTION, POWDER, LYOPHILIZED, FOR SOLUTION INTRAVENOUS at 16:25

## 2021-01-01 RX ADMIN — FUROSEMIDE 20 MG: 10 INJECTION, SOLUTION INTRAMUSCULAR; INTRAVENOUS at 08:22

## 2021-01-01 RX ADMIN — PANTOPRAZOLE SODIUM 40 MG: 40 TABLET, DELAYED RELEASE ORAL at 22:13

## 2021-01-01 RX ADMIN — ALBUTEROL SULFATE 2.5 MG: 2.5 SOLUTION RESPIRATORY (INHALATION) at 11:06

## 2021-01-01 RX ADMIN — DOXYCYCLINE 100 MG: 100 INJECTION, POWDER, LYOPHILIZED, FOR SOLUTION INTRAVENOUS at 08:22

## 2021-01-01 RX ADMIN — LACTULOSE: 10 SOLUTION ORAL at 22:24

## 2021-01-01 RX ADMIN — GABAPENTIN 400 MG: 400 CAPSULE ORAL at 14:14

## 2021-01-01 RX ADMIN — ALBUMIN (HUMAN) 75 G: 12.5 INJECTION, SOLUTION INTRAVENOUS at 14:44

## 2021-01-01 RX ADMIN — SODIUM CHLORIDE: 9 INJECTION, SOLUTION INTRAVENOUS at 18:24

## 2021-01-01 RX ADMIN — FUROSEMIDE 20 MG: 10 INJECTION, SOLUTION INTRAMUSCULAR; INTRAVENOUS at 08:23

## 2021-01-01 RX ADMIN — INFLUENZA A VIRUS A/VICTORIA/2454/2019 IVR-207 (H1N1) ANTIGEN (PROPIOLACTONE INACTIVATED), INFLUENZA A VIRUS A/HONG KONG/2671/2019 IVR-208 (H3N2) ANTIGEN (PROPIOLACTONE INACTIVATED), INFLUENZA B VIRUS B/VICTORIA/705/2018 BVR-11 ANTIGEN (PROPIOLACTONE INACTIVATED), INFLUENZA B VIRUS B/PHUKET/3073/2013 BVR-1B ANTIGEN (PROPIOLACTONE INACTIVATED) 0.5 ML: 15; 15; 15; 15 INJECTION, SUSPENSION INTRAMUSCULAR at 12:54

## 2021-01-01 RX ADMIN — FUROSEMIDE 20 MG: 10 INJECTION, SOLUTION INTRAMUSCULAR; INTRAVENOUS at 12:54

## 2021-01-01 RX ADMIN — FUROSEMIDE 20 MG: 10 INJECTION, SOLUTION INTRAMUSCULAR; INTRAVENOUS at 22:22

## 2021-01-01 RX ADMIN — SPIRONOLACTONE 50 MG: 25 TABLET ORAL at 11:03

## 2021-01-01 RX ADMIN — DEXTROSE MONOHYDRATE 12.5 G: 25 INJECTION, SOLUTION INTRAVENOUS at 17:59

## 2021-01-01 RX ADMIN — SODIUM CHLORIDE 500 ML: 9 INJECTION, SOLUTION INTRAVENOUS at 03:44

## 2021-01-01 RX ADMIN — LACTULOSE 30 G: 10 SOLUTION ORAL at 17:37

## 2021-01-01 RX ADMIN — SPIRONOLACTONE 50 MG: 25 TABLET ORAL at 09:08

## 2021-01-01 RX ADMIN — IOPAMIDOL 75 ML: 755 INJECTION, SOLUTION INTRAVENOUS at 12:52

## 2021-01-01 RX ADMIN — DEXTROSE MONOHYDRATE 12.5 G: 25 INJECTION, SOLUTION INTRAVENOUS at 20:50

## 2021-01-01 RX ADMIN — SODIUM BICARBONATE 50 MEQ: 84 INJECTION, SOLUTION INTRAVENOUS at 17:59

## 2021-01-01 RX ADMIN — Medication 1 PUFF: at 07:45

## 2021-01-01 RX ADMIN — ALBUTEROL SULFATE 2.5 MG: 2.5 SOLUTION RESPIRATORY (INHALATION) at 16:10

## 2021-01-01 RX ADMIN — Medication 10 ML: at 08:22

## 2021-01-01 RX ADMIN — APIXABAN 10 MG: 5 TABLET, FILM COATED ORAL at 09:08

## 2021-01-01 RX ADMIN — IPRATROPIUM BROMIDE AND ALBUTEROL SULFATE 3 AMPULE: .5; 3 SOLUTION RESPIRATORY (INHALATION) at 03:45

## 2021-01-01 RX ADMIN — Medication 1 PUFF: at 17:45

## 2021-01-01 RX ADMIN — DOXYCYCLINE 100 MG: 100 INJECTION, POWDER, LYOPHILIZED, FOR SOLUTION INTRAVENOUS at 20:12

## 2021-01-01 RX ADMIN — OXYCODONE 5 MG: 5 TABLET ORAL at 20:29

## 2021-01-01 RX ADMIN — Medication 10 ML: at 01:08

## 2021-01-01 RX ADMIN — GABAPENTIN 400 MG: 400 CAPSULE ORAL at 16:46

## 2021-01-01 RX ADMIN — MORPHINE SULFATE 10 MG: 10 INJECTION INTRAVENOUS at 03:35

## 2021-01-01 RX ADMIN — SPIRONOLACTONE 50 MG: 25 TABLET ORAL at 20:08

## 2021-01-01 RX ADMIN — DEXTROSE MONOHYDRATE 12.5 G: 25 INJECTION, SOLUTION INTRAVENOUS at 20:52

## 2021-01-01 RX ADMIN — PANTOPRAZOLE SODIUM 40 MG: 40 INJECTION, POWDER, FOR SOLUTION INTRAVENOUS at 22:22

## 2021-01-01 RX ADMIN — PIPERACILLIN SODIUM AND TAZOBACTAM SODIUM 3375 MG: 3; .375 INJECTION, POWDER, LYOPHILIZED, FOR SOLUTION INTRAVENOUS at 01:27

## 2021-01-01 RX ADMIN — DEXAMETHASONE SODIUM PHOSPHATE 10 MG: 10 INJECTION INTRAMUSCULAR; INTRAVENOUS at 03:45

## 2021-01-01 RX ADMIN — IPRATROPIUM BROMIDE AND ALBUTEROL SULFATE 1 AMPULE: .5; 3 SOLUTION RESPIRATORY (INHALATION) at 11:34

## 2021-01-01 RX ADMIN — OXYCODONE 5 MG: 5 TABLET ORAL at 22:32

## 2021-01-01 RX ADMIN — POTASSIUM BICARBONATE 40 MEQ: 782 TABLET, EFFERVESCENT ORAL at 07:51

## 2021-01-01 RX ADMIN — Medication 10 ML: at 10:50

## 2021-01-01 RX ADMIN — DEXTROSE MONOHYDRATE 12.5 G: 25 INJECTION, SOLUTION INTRAVENOUS at 21:23

## 2021-01-01 RX ADMIN — FUROSEMIDE 20 MG: 10 INJECTION, SOLUTION INTRAMUSCULAR; INTRAVENOUS at 10:50

## 2021-01-01 RX ADMIN — PREDNISONE 40 MG: 20 TABLET ORAL at 08:38

## 2021-01-01 RX ADMIN — Medication 1 PUFF: at 07:38

## 2021-01-01 RX ADMIN — MUPIROCIN: 20 OINTMENT TOPICAL at 15:13

## 2021-01-01 RX ADMIN — LORAZEPAM 1 MG: 2 INJECTION INTRAMUSCULAR; INTRAVENOUS at 06:08

## 2021-01-01 RX ADMIN — Medication 10 ML: at 21:26

## 2021-01-01 RX ADMIN — SPIRONOLACTONE 50 MG: 25 TABLET ORAL at 07:49

## 2021-01-01 RX ADMIN — ONDANSETRON HYDROCHLORIDE 4 MG: 2 INJECTION, SOLUTION INTRAMUSCULAR; INTRAVENOUS at 08:23

## 2021-01-01 RX ADMIN — DEXTROSE MONOHYDRATE 1000 ML: 50 INJECTION, SOLUTION INTRAVENOUS at 16:07

## 2021-01-01 RX ADMIN — LACTULOSE 20 G: 10 SOLUTION ORAL at 22:27

## 2021-01-01 RX ADMIN — LIDOCAINE HYDROCHLORIDE 40 MG: 20 INJECTION, SOLUTION INFILTRATION; PERINEURAL at 15:08

## 2021-01-01 RX ADMIN — DEXTROSE MONOHYDRATE 12.5 G: 25 INJECTION, SOLUTION INTRAVENOUS at 21:44

## 2021-01-01 RX ADMIN — EPINEPHRINE 2 MCG/MIN: 1 INJECTION INTRAMUSCULAR; INTRAVENOUS; SUBCUTANEOUS at 19:19

## 2021-01-01 RX ADMIN — ALBUMIN (HUMAN) 50 G: 0.25 INJECTION, SOLUTION INTRAVENOUS at 19:42

## 2021-01-01 RX ADMIN — MIDAZOLAM HYDROCHLORIDE 2 MG: 1 INJECTION, SOLUTION INTRAMUSCULAR; INTRAVENOUS at 11:29

## 2021-01-01 RX ADMIN — Medication 10 ML: at 20:09

## 2021-01-01 RX ADMIN — DEXTROSE MONOHYDRATE 45 MCG/MIN: 50 INJECTION, SOLUTION INTRAVENOUS at 01:09

## 2021-01-01 RX ADMIN — METRONIDAZOLE 500 MG: 500 INJECTION, SOLUTION INTRAVENOUS at 20:59

## 2021-01-01 RX ADMIN — FUROSEMIDE 20 MG: 10 INJECTION, SOLUTION INTRAMUSCULAR; INTRAVENOUS at 18:42

## 2021-01-01 RX ADMIN — GABAPENTIN 400 MG: 400 CAPSULE ORAL at 09:08

## 2021-01-01 RX ADMIN — MIDAZOLAM HYDROCHLORIDE 2 MG: 1 INJECTION, SOLUTION INTRAMUSCULAR; INTRAVENOUS at 11:36

## 2021-01-01 RX ADMIN — GABAPENTIN 400 MG: 400 CAPSULE ORAL at 20:28

## 2021-01-01 RX ADMIN — SODIUM CHLORIDE, POTASSIUM CHLORIDE, SODIUM LACTATE AND CALCIUM CHLORIDE: 600; 310; 30; 20 INJECTION, SOLUTION INTRAVENOUS at 15:03

## 2021-01-01 RX ADMIN — IPRATROPIUM BROMIDE AND ALBUTEROL SULFATE 1 AMPULE: .5; 3 SOLUTION RESPIRATORY (INHALATION) at 19:10

## 2021-01-01 RX ADMIN — SPIRONOLACTONE 50 MG: 25 TABLET ORAL at 08:38

## 2021-01-01 RX ADMIN — Medication 10 ML: at 08:23

## 2021-01-01 RX ADMIN — METHYLPREDNISOLONE SODIUM SUCCINATE 40 MG: 40 INJECTION, POWDER, FOR SOLUTION INTRAMUSCULAR; INTRAVENOUS at 09:37

## 2021-01-01 RX ADMIN — Medication 10 ML: at 11:03

## 2021-01-01 RX ADMIN — CEFTRIAXONE SODIUM 1000 MG: 1 INJECTION, POWDER, FOR SOLUTION INTRAMUSCULAR; INTRAVENOUS at 18:31

## 2021-01-01 RX ADMIN — CEFEPIME HYDROCHLORIDE 1000 MG: 1 INJECTION, POWDER, FOR SOLUTION INTRAMUSCULAR; INTRAVENOUS at 03:01

## 2021-01-01 RX ADMIN — PIPERACILLIN SODIUM AND TAZOBACTAM SODIUM 3375 MG: 3; .375 INJECTION, POWDER, LYOPHILIZED, FOR SOLUTION INTRAVENOUS at 11:03

## 2021-01-01 RX ADMIN — LACTULOSE: 10 SOLUTION ORAL at 09:10

## 2021-01-01 RX ADMIN — SODIUM CHLORIDE: 9 INJECTION, SOLUTION INTRAVENOUS at 19:43

## 2021-01-01 RX ADMIN — DEXTROSE MONOHYDRATE 12.5 G: 25 INJECTION, SOLUTION INTRAVENOUS at 22:00

## 2021-01-01 RX ADMIN — QUETIAPINE FUMARATE 50 MG: 25 TABLET ORAL at 22:22

## 2021-01-01 RX ADMIN — PANTOPRAZOLE SODIUM 40 MG: 40 TABLET, DELAYED RELEASE ORAL at 04:32

## 2021-01-01 RX ADMIN — DOXYCYCLINE 100 MG: 100 INJECTION, POWDER, LYOPHILIZED, FOR SOLUTION INTRAVENOUS at 22:23

## 2021-01-01 RX ADMIN — FOLIC ACID: 5 INJECTION, SOLUTION INTRAMUSCULAR; INTRAVENOUS; SUBCUTANEOUS at 08:08

## 2021-01-01 RX ADMIN — ALBUTEROL SULFATE 2.5 MG: 2.5 SOLUTION RESPIRATORY (INHALATION) at 07:45

## 2021-01-01 RX ADMIN — ALBUTEROL SULFATE 2.5 MG: 2.5 SOLUTION RESPIRATORY (INHALATION) at 11:33

## 2021-01-01 RX ADMIN — QUETIAPINE FUMARATE 50 MG: 25 TABLET ORAL at 20:28

## 2021-01-01 RX ADMIN — OXYCODONE 5 MG: 5 TABLET ORAL at 08:38

## 2021-01-01 RX ADMIN — GABAPENTIN 400 MG: 400 CAPSULE ORAL at 22:24

## 2021-01-01 RX ADMIN — CEFEPIME HYDROCHLORIDE 1000 MG: 1 INJECTION, POWDER, FOR SOLUTION INTRAMUSCULAR; INTRAVENOUS at 19:46

## 2021-01-01 RX ADMIN — IPRATROPIUM BROMIDE AND ALBUTEROL SULFATE 1 AMPULE: .5; 3 SOLUTION RESPIRATORY (INHALATION) at 11:48

## 2021-01-01 RX ADMIN — ALBUTEROL SULFATE 2.5 MG: 2.5 SOLUTION RESPIRATORY (INHALATION) at 07:38

## 2021-01-01 RX ADMIN — HYDROCORTISONE SODIUM SUCCINATE 100 MG: 100 INJECTION, POWDER, FOR SOLUTION INTRAMUSCULAR; INTRAVENOUS at 19:38

## 2021-01-01 RX ADMIN — OXYCODONE 5 MG: 5 TABLET ORAL at 04:26

## 2021-01-01 RX ADMIN — PANTOPRAZOLE SODIUM 40 MG: 40 INJECTION, POWDER, FOR SOLUTION INTRAVENOUS at 19:55

## 2021-01-01 RX ADMIN — DEXTROSE MONOHYDRATE 12.5 G: 25 INJECTION, SOLUTION INTRAVENOUS at 21:25

## 2021-01-01 RX ADMIN — GABAPENTIN 400 MG: 400 CAPSULE ORAL at 08:38

## 2021-01-01 RX ADMIN — NYSTATIN 500000 UNITS: 500000 SUSPENSION ORAL at 09:09

## 2021-01-01 RX ADMIN — NYSTATIN 500000 UNITS: 500000 SUSPENSION ORAL at 22:23

## 2021-01-01 RX ADMIN — DEXTROSE MONOHYDRATE 12.5 G: 25 INJECTION, SOLUTION INTRAVENOUS at 21:54

## 2021-01-01 RX ADMIN — SODIUM CHLORIDE 500 ML: 9 INJECTION, SOLUTION INTRAVENOUS at 18:44

## 2021-01-01 RX ADMIN — ALBUTEROL SULFATE 2.5 MG: 2.5 SOLUTION RESPIRATORY (INHALATION) at 23:08

## 2021-01-01 RX ADMIN — OXYCODONE 5 MG: 5 TABLET ORAL at 02:18

## 2021-01-01 RX ADMIN — ALBUTEROL SULFATE 2.5 MG: 2.5 SOLUTION RESPIRATORY (INHALATION) at 23:23

## 2021-01-01 RX ADMIN — ENOXAPARIN SODIUM 40 MG: 40 INJECTION SUBCUTANEOUS at 10:50

## 2021-01-01 RX ADMIN — KETOROLAC TROMETHAMINE 15 MG: 30 INJECTION, SOLUTION INTRAMUSCULAR at 11:45

## 2021-01-01 RX ADMIN — ALBUTEROL SULFATE 2.5 MG: 2.5 SOLUTION RESPIRATORY (INHALATION) at 20:47

## 2021-01-01 RX ADMIN — ALBUTEROL SULFATE 2.5 MG: 2.5 SOLUTION RESPIRATORY (INHALATION) at 15:00

## 2021-01-01 RX ADMIN — DEXTROSE MONOHYDRATE 25 G: 25 INJECTION, SOLUTION INTRAVENOUS at 14:45

## 2021-01-01 RX ADMIN — IOPAMIDOL 85 ML: 755 INJECTION, SOLUTION INTRAVENOUS at 07:07

## 2021-01-01 RX ADMIN — ALBUTEROL SULFATE 2.5 MG: 2.5 SOLUTION RESPIRATORY (INHALATION) at 07:39

## 2021-01-01 RX ADMIN — HYDROCORTISONE SODIUM SUCCINATE 100 MG: 100 INJECTION, POWDER, FOR SOLUTION INTRAMUSCULAR; INTRAVENOUS at 02:59

## 2021-01-01 RX ADMIN — QUETIAPINE FUMARATE 50 MG: 25 TABLET ORAL at 22:24

## 2021-01-01 RX ADMIN — CEFTRIAXONE SODIUM 1000 MG: 1 INJECTION, POWDER, FOR SOLUTION INTRAMUSCULAR; INTRAVENOUS at 22:23

## 2021-01-01 RX ADMIN — ALBUTEROL SULFATE 2.5 MG: 2.5 SOLUTION RESPIRATORY (INHALATION) at 15:19

## 2021-01-01 RX ADMIN — GABAPENTIN 800 MG: 400 CAPSULE ORAL at 11:02

## 2021-01-01 RX ADMIN — ALBUMIN (HUMAN) 25 G: 0.25 INJECTION, SOLUTION INTRAVENOUS at 08:24

## 2021-01-01 RX ADMIN — PREDNISONE 40 MG: 20 TABLET ORAL at 09:08

## 2021-01-01 RX ADMIN — ALBUTEROL SULFATE 2.5 MG: 2.5 SOLUTION RESPIRATORY (INHALATION) at 03:16

## 2021-01-01 RX ADMIN — ALBUTEROL SULFATE 2.5 MG: 2.5 SOLUTION RESPIRATORY (INHALATION) at 19:14

## 2021-01-01 RX ADMIN — Medication 1 PUFF: at 20:49

## 2021-01-01 RX ADMIN — SODIUM BICARBONATE: 84 INJECTION, SOLUTION INTRAVENOUS at 00:23

## 2021-01-01 RX ADMIN — VASOPRESSIN 0.04 UNITS/MIN: 20 INJECTION INTRAVENOUS at 00:32

## 2021-01-01 RX ADMIN — DEXTROSE MONOHYDRATE 8.53 MCG/MIN: 50 INJECTION, SOLUTION INTRAVENOUS at 16:30

## 2021-01-01 RX ADMIN — VASOPRESSIN 0.04 UNITS/MIN: 20 INJECTION INTRAVENOUS at 18:43

## 2021-01-01 RX ADMIN — DEXTROSE MONOHYDRATE 12.5 G: 25 INJECTION, SOLUTION INTRAVENOUS at 22:01

## 2021-01-01 RX ADMIN — SODIUM CHLORIDE 1836 ML: 9 INJECTION, SOLUTION INTRAVENOUS at 15:00

## 2021-01-01 RX ADMIN — ALBUTEROL SULFATE 2.5 MG: 2.5 SOLUTION RESPIRATORY (INHALATION) at 19:32

## 2021-01-01 RX ADMIN — PROPOFOL 200 MG: 10 INJECTION, EMULSION INTRAVENOUS at 15:08

## 2021-01-01 RX ADMIN — FUROSEMIDE 20 MG: 10 INJECTION, SOLUTION INTRAMUSCULAR; INTRAVENOUS at 07:49

## 2021-01-01 RX ADMIN — SODIUM BICARBONATE 150 MEQ: 84 INJECTION INTRAVENOUS at 21:45

## 2021-01-01 RX ADMIN — METHYLPREDNISOLONE SODIUM SUCCINATE 40 MG: 40 INJECTION, POWDER, FOR SOLUTION INTRAMUSCULAR; INTRAVENOUS at 11:34

## 2021-01-01 RX ADMIN — DOXYCYCLINE 100 MG: 100 INJECTION, POWDER, LYOPHILIZED, FOR SOLUTION INTRAVENOUS at 07:49

## 2021-01-01 RX ADMIN — LACTULOSE 20 G: 10 SOLUTION ORAL at 16:09

## 2021-01-01 RX ADMIN — GABAPENTIN 400 MG: 400 CAPSULE ORAL at 16:13

## 2021-01-01 RX ADMIN — APIXABAN 10 MG: 5 TABLET, FILM COATED ORAL at 22:24

## 2021-01-01 RX ADMIN — FUROSEMIDE 20 MG: 10 INJECTION, SOLUTION INTRAMUSCULAR; INTRAVENOUS at 16:45

## 2021-01-01 RX ADMIN — GABAPENTIN 400 MG: 400 CAPSULE ORAL at 22:22

## 2021-01-01 RX ADMIN — PROPOFOL 100 MG: 10 INJECTION, EMULSION INTRAVENOUS at 15:15

## 2021-01-01 RX ADMIN — ALBUMIN (HUMAN) 25 G: 0.25 INJECTION, SOLUTION INTRAVENOUS at 02:45

## 2021-01-01 RX ADMIN — FUROSEMIDE 20 MG: 10 INJECTION, SOLUTION INTRAMUSCULAR; INTRAVENOUS at 18:00

## 2021-01-01 RX ADMIN — IPRATROPIUM BROMIDE AND ALBUTEROL SULFATE 1 AMPULE: .5; 3 SOLUTION RESPIRATORY (INHALATION) at 15:12

## 2021-01-01 RX ADMIN — Medication 1 PUFF: at 11:32

## 2021-01-01 RX ADMIN — ENOXAPARIN SODIUM 40 MG: 40 INJECTION SUBCUTANEOUS at 08:34

## 2021-01-01 RX ADMIN — APIXABAN 5 MG: 5 TABLET, FILM COATED ORAL at 17:37

## 2021-01-01 RX ADMIN — Medication 10 ML: at 22:22

## 2021-01-01 RX ADMIN — LACTULOSE 30 G: 10 SOLUTION ORAL at 11:36

## 2021-01-01 RX ADMIN — SPIRONOLACTONE 50 MG: 25 TABLET ORAL at 20:28

## 2021-01-01 RX ADMIN — SODIUM CHLORIDE: 9 INJECTION, SOLUTION INTRAVENOUS at 02:10

## 2021-01-01 RX ADMIN — DEXTROSE MONOHYDRATE 25 G: 25 INJECTION, SOLUTION INTRAVENOUS at 16:00

## 2021-01-01 RX ADMIN — SODIUM BICARBONATE 150 MEQ: 84 INJECTION INTRAVENOUS at 01:20

## 2021-01-01 RX ADMIN — PIPERACILLIN SODIUM AND TAZOBACTAM SODIUM 4500 MG: 4; .5 INJECTION, POWDER, LYOPHILIZED, FOR SOLUTION INTRAVENOUS at 15:25

## 2021-01-01 RX ADMIN — ALBUMIN (HUMAN) 25 G: 0.25 INJECTION, SOLUTION INTRAVENOUS at 01:07

## 2021-01-01 RX ADMIN — DEXTROSE MONOHYDRATE 12.5 G: 25 INJECTION, SOLUTION INTRAVENOUS at 21:41

## 2021-01-01 RX ADMIN — EPINEPHRINE 30 MCG/MIN: 1 INJECTION INTRAMUSCULAR; INTRAVENOUS; SUBCUTANEOUS at 02:49

## 2021-01-01 RX ADMIN — SPIRONOLACTONE 50 MG: 25 TABLET ORAL at 22:22

## 2021-01-01 RX ADMIN — DEXTROSE MONOHYDRATE 12.5 G: 25 INJECTION, SOLUTION INTRAVENOUS at 19:52

## 2021-01-01 RX ADMIN — GABAPENTIN 400 MG: 400 CAPSULE ORAL at 10:49

## 2021-01-01 RX ADMIN — PANTOPRAZOLE SODIUM 40 MG: 40 INJECTION, POWDER, FOR SOLUTION INTRAVENOUS at 07:50

## 2021-01-01 RX ADMIN — SODIUM CHLORIDE: 9 INJECTION, SOLUTION INTRAVENOUS at 12:48

## 2021-01-01 RX ADMIN — DEXTROSE MONOHYDRATE 12.5 G: 25 INJECTION, SOLUTION INTRAVENOUS at 21:55

## 2021-01-01 RX ADMIN — MUPIROCIN: 20 OINTMENT TOPICAL at 07:50

## 2021-01-01 ASSESSMENT — ENCOUNTER SYMPTOMS
EYES NEGATIVE: 1
ALLERGIC/IMMUNOLOGIC NEGATIVE: 1
ALLERGIC/IMMUNOLOGIC NEGATIVE: 1
EYES NEGATIVE: 1
ABDOMINAL DISTENTION: 1
ABDOMINAL PAIN: 1
ABDOMINAL DISTENTION: 1
SHORTNESS OF BREATH: 1
RESPIRATORY NEGATIVE: 1
ABDOMINAL PAIN: 1

## 2021-01-01 ASSESSMENT — PULMONARY FUNCTION TESTS
PIF_VALUE: 41
PIF_VALUE: 39
PIF_VALUE: 38
PIF_VALUE: 40
PIF_VALUE: 17
PIF_VALUE: 42
PIF_VALUE: 41
PIF_VALUE: 38
PIF_VALUE: 39
PIF_VALUE: 38
PIF_VALUE: 41
PIF_VALUE: 40
PIF_VALUE: 40
PIF_VALUE: 41

## 2021-01-01 ASSESSMENT — PAIN SCALES - GENERAL
PAINLEVEL_OUTOF10: 6
PAINLEVEL_OUTOF10: 10
PAINLEVEL_OUTOF10: 5
PAINLEVEL_OUTOF10: 8
PAINLEVEL_OUTOF10: 6
PAINLEVEL_OUTOF10: 0
PAINLEVEL_OUTOF10: 8
PAINLEVEL_OUTOF10: 0
PAINLEVEL_OUTOF10: 0
PAINLEVEL_OUTOF10: 8

## 2021-01-01 ASSESSMENT — PAIN DESCRIPTION - LOCATION: LOCATION: ABDOMEN

## 2021-01-01 ASSESSMENT — PAIN DESCRIPTION - DESCRIPTORS
DESCRIPTORS: DISCOMFORT;SHARP
DESCRIPTORS: DISCOMFORT;SHARP

## 2021-01-01 ASSESSMENT — PAIN - FUNCTIONAL ASSESSMENT: PAIN_FUNCTIONAL_ASSESSMENT: 0-10

## 2021-01-12 NOTE — TELEPHONE ENCOUNTER
Patient did not show for CT/PFT follow-up appointment  with Nina Paz on 1/12/21    Called patient to check-in for VV and male answered stating that patient was at her cousins and that he would have the patient call the office back. Patient was also no show on: 8/26/20    LOV   ASSESSMENT:11/25/20  · Recent Community acquired pneumonia   · COPD   · R> Left pleural effusion: s/p thora 10/21 with 450cc out transudative - favor hepatic hydrothorax  · Paraseptal emphysema  · ESLD with ascites s/p paracentesis 10/19/20  · H/O THC, opiate and amphetamine abuse  · H/O epilepsy     PLAN:  · Inhaled bronchodilators - has nebulizer, uses about once per day  · F/U with Gastroenterology/hepatology  · Outpatient PFT and chest ct in mid January 2021, dx COPD, abnormal imaging  · Tobacco abuse in remission     Trevor Maharaj is a 64 y.o. female evaluated via telephone on 11/25/2020. Consent: She and/or health care decision maker is aware that that she may receive a bill for this telephone service, depending on her insurance coverage, and has provided verbal consent to proceed: YES. I communicated with the patient and/or health care decision maker about: see interval history above. Details of this discussion including any medical advice provided: see plan above. Total Time: minutes: 11-20 minutes. I affirm this is a Patient Initiated Episode with a Patient who has not had a related appointment within my department in the past 7 days or scheduled within the next 24 hours. Patient identification was verified at the start of the visit: YES  Pursuant to the emergency declaration under the [facility], [address] [place] [address] authority and the [employer] Act, this Telephone Visit was conducted with patient's (and/or legal guardian's) consent, to reduce the patient's risk of exposure to COVID-19 and provide necessary medical care.  The patient (and/or legal guardian) his advised to contact this office for worsening conditions or problems, and seek emergency medical treatment and/or call 911 if urgent care needed,

## 2021-01-14 NOTE — ED PROVIDER NOTES
[address] ED      CHIEF COMPLAINT  Abdominal Pain (hx of cirrhosis. pt states she is bloated and swollen. has had paracentesis in the past.  states she usually comes to get tapped when she starts to swell.)     [facility]  Charlotte Alba is a 64 y.o. female  who presents to the ED complaining of abdominal pain and distention. Patient has a history of alcoholic liver disease and cirrhosis requiring multiple episodes of paracentesis in the past.  She states that this feels similar. She denies any associated fevers or chills. She states that it has been several weeks since she has been tapped. She denies any chest pain or shortness of breath. Denies any other complaints or concerns. She states that she does not have a gastroenterologist who she follows with. She denies other complaints or concerns at this time. No other complaints, modifying factors or associated symptoms. I have reviewed the following from the nursing documentation.     Past Medical History:   Diagnosis Date    Back pain     COPD (chronic obstructive pulmonary disease) (HCC)     Hepatitis B     Hepatitis C antibody positive in blood 2018    Seizures (HCC)      Past Surgical History:   Procedure Laterality Date    APPENDECTOMY      CARPAL TUNNEL RELEASE       SECTION      ROTATOR CUFF REPAIR Right     SKIN GRAFT      TUBAL LIGATION       Family History   Problem Relation Age of Onset    Diabetes Father     Heart Disease Father      Social History     Socioeconomic History    Marital status:      Spouse name: Not on file    Number of children: Not on file    Years of education: Not on file    Highest education level: Not on file   Occupational History    Not on file   Social Needs    Financial resource strain: Not on file    Food insecurity     Worry: Not on file     Inability: Not on file    Transportation needs     Medical: Not on file     Non-medical: Not on file Tobacco Use    Smoking status: Former Smoker     Packs/day: 1.00     Years: 40.00     Pack years: 40.00     Quit date: 2020     Years since quittin.3    Smokeless tobacco: Never Used   Substance and Sexual Activity    Alcohol use: No    Drug use: Not Currently     Types: Methamphetamines, Marijuana    Sexual activity: Not on file   Lifestyle    Physical activity     Days per week: Not on file     Minutes per session: Not on file    Stress: Not on file   Relationships    Social connections     Talks on phone: Not on file     Gets together: Not on file     Attends Hindu service: Not on file     Active member of club or organization: Not on file     Attends meetings of clubs or organizations: Not on file     Relationship status: Not on file    Intimate partner violence     Fear of current or ex partner: Not on file     Emotionally abused: Not on file     Physically abused: Not on file     Forced sexual activity: Not on file   Other Topics Concern    Not on file   Social History Narrative    Not on file     Current Facility-Administered Medications   Medication Dose Route Frequency Provider Last Rate Last Admin    potassium bicarb-citric acid (EFFER-K) effervescent tablet 40 mEq  40 mEq Oral Once Sy Ocasio MD         Current Outpatient Medications   Medication Sig Dispense Refill    SYMBICORT 80-4.5 MCG/ACT AERO       beclomethasone (QVAR REDIHALER) 80 MCG/ACT AERB inhaler Inhale 2 puffs into the lungs      albuterol sulfate HFA (VENTOLIN HFA) 108 (90 Base) MCG/ACT inhaler Inhale 2 puffs into the lungs 4 times daily as needed for Wheezing 1 Inhaler 1    furosemide (LASIX) 20 MG tablet Take 1 tablet by mouth 2 times daily 60 tablet 0    GABAPENTIN PO Take 800 mg by mouth 3 times daily Unsure of dosage       QUEtiapine Fumarate (SEROQUEL PO) Take 100 mg by mouth nightly       spironolactone (ALDACTONE) 50 MG tablet TAKE 1 TABLET BY MOUTH TWICE A DAY       Allergies   Allergen Reactions    Flexeril [Cyclobenzaprine]      Causes legs to be restless    Ultram [Tramadol Hcl]     Robaxin [Methocarbamol] Nausea And Vomiting       REVIEW OF SYSTEMS  10 systems reviewed, pertinent positives per HPI otherwise noted to be negative. PHYSICAL EXAM  /69   Pulse 88   Temp 98 °F (36.7 °C) (Oral)   Resp 20   Ht 5' 6\" (1.676 m)   Wt 120 lb (54.4 kg)   LMP 11/12/2012   SpO2 93%   BMI 19.37 kg/m²    GENERAL APPEARANCE: Awake and alert. Cooperative. No acute distress. HENT: Normocephalic. Atraumatic. NECK: Supple. EYES: PERRL. EOM's grossly intact. HEART/CHEST: RRR. No murmurs. LUNGS: Respirations unlabored. CTAB. Good air exchange. Speaking comfortably in full sentences. ABDOMEN: Distended, tense. Mildly tender to palpation diffusely. No guarding or rigidity. Nonperitoneal.  MUSCULOSKELETAL: No extremity edema. Compartments soft. No deformity. No tenderness in the extremities. All extremities neurovascularly intact. SKIN: Warm and dry. No acute rashes. NEUROLOGICAL: Alert and oriented. CN's 2-12 intact. No gross facial drooping. Strength 5/5, sensation intact. PSYCHIATRIC: Normal mood and affect. LABS  I have reviewed all labs for this visit.    Results for orders placed or performed during the hospital encounter of 01/14/21   CBC auto differential   Result Value Ref Range    WBC 7.5 4.0 - 11.0 K/uL    RBC 4.40 4.00 - 5.20 M/uL    Hemoglobin 13.2 12.0 - 16.0 g/dL    Hematocrit 39.0 36.0 - 48.0 %    MCV 88.8 80.0 - 100.0 fL    MCH 30.0 26.0 - 34.0 pg    MCHC 33.8 31.0 - 36.0 g/dL    RDW 17.4 (H) 12.4 - 15.4 %    Platelets 421 096 - 457 K/uL    MPV 8.0 5.0 - 10.5 fL    Neutrophils % 57.8 %    Lymphocytes % 29.7 %    Monocytes % 10.0 %    Eosinophils % 1.6 %    Basophils % 0.9 %    Neutrophils Absolute 4.3 1.7 - 7.7 K/uL    Lymphocytes Absolute 2.2 1.0 - 5.1 K/uL    Monocytes Absolute 0.8 0.0 - 1.3 K/uL    Eosinophils Absolute 0.1 0.0 - 0.6 K/uL    Basophils Absolute 0.1 0.0 - 0.2 K/uL   Comprehensive Metabolic Panel w/ Reflex to MG   Result Value Ref Range    Sodium 133 (L) 136 - 145 mmol/L    Potassium reflex Magnesium 3.0 (L) 3.5 - 5.1 mmol/L    Chloride 92 (L) 99 - 110 mmol/L    CO2 33 (H) 21 - 32 mmol/L    Anion Gap 8 3 - 16    Glucose 126 (H) 70 - 99 mg/dL    BUN 15 7 - 20 mg/dL    CREATININE <0.5 (L) 0.6 - 1.1 mg/dL    GFR Non-African American >60 >60    GFR African American >60 >60    Calcium 7.8 (L) 8.3 - 10.6 mg/dL    Total Protein 7.2 6.4 - 8.2 g/dL    Alb 2.5 (L) 3.4 - 5.0 g/dL    Albumin/Globulin Ratio 0.5 (L) 1.1 - 2.2    Total Bilirubin 1.2 (H) 0.0 - 1.0 mg/dL    Alkaline Phosphatase 155 (H) 40 - 129 U/L     (H) 10 - 40 U/L     (H) 15 - 37 U/L    Globulin 4.7 g/dL   Lipase   Result Value Ref Range    Lipase 50.0 13.0 - 60.0 U/L   Magnesium   Result Value Ref Range    Magnesium 1.70 (L) 1.80 - 2.40 mg/dL   Protime-INR   Result Value Ref Range    Protime 16.6 (H) 10.0 - 13.2 sec    INR 1.43 (H) 0.86 - 1.14   Troponin   Result Value Ref Range    Troponin <0.01 <0.01 ng/mL   APTT   Result Value Ref Range    aPTT 35.7 24.2 - 36.2 sec   EKG 12 Lead   Result Value Ref Range    Ventricular Rate 88 BPM    Atrial Rate 88 BPM    P-R Interval 126 ms    QRS Duration 78 ms    Q-T Interval 426 ms    QTc Calculation (Bazett) 515 ms    P Axis 90 degrees    R Axis 50 degrees    T Axis 21 degrees    Diagnosis       Normal sinus rhythmProlonged QTAbnormal ECGNo previous ECGs available       ECG  The Ekg interpreted by me shows  normal sinus rhythm with a rate of 88  Axis is   Normal  QTc is  Prolonged at 515  Intervals and Durations are unremarkable. ST Segments: nonspecific changes  No significant change from prior EKG dated 10/15/2020    RADIOLOGY  CT ABDOMEN PELVIS W IV CONTRAST Additional Contrast? None   Final Result   1. Cirrhosis with large volume ascites. 2. Small chronic right pleural effusion. 3. Cholelithiasis.    4. Nonobstructing left injection 75 mL (75 mLs Intravenous Given 1/14/21 1252)        CLINICAL IMPRESSION  1. Ascites due to alcoholic cirrhosis ([address]    2. Hypokalemia        Blood pressure 106/69, pulse 88, temperature 98 °F (36.7 °C), temperature source Oral, resp. rate 20, height 5' 6\" (1.676 m), weight 120 lb (54.4 kg), last menstrual period 11/12/2012, SpO2 93 %. [address] was signed out in stable condition    Patient was given scripts for the following medications. I counseled patient how to take these medications. New Prescriptions    No medications on file       Follow-up with:  Ayesha Farris PA-C  [facility] [address]   [phone]    Schedule an appointment as soon as possible for a visit   As needed    [facility] [place] [facility]  [address] [facility]  [phone]  Go to   If symptoms worsen      DISCLAIMER: This chart was created using Dragon dictation software. Efforts were made by me to ensure accuracy, however some errors may be present due to limitations of this technology and occasionally words are not transcribed correctly.        Paul Hammonds MD  01/14/21 [address] Horacio Del Real MD  01/14/21 [id number]

## 2021-01-14 NOTE — TELEPHONE ENCOUNTER
Called patient and rescheduled CT and PFT to 2/3/21. Patient informed to have COVID test done 2/28/21 or 2/29/21. Follow-up with  scheduled 2/23/21.

## 2021-01-14 NOTE — ED TRIAGE NOTES
Chief Complaint   Patient presents with    Abdominal Pain     hx of cirrhosis. pt states she is bloated and swollen. has had paracentesis in the past.  states she usually comes to get tapped when she starts to swell.      Preferred [facility]acy:  [facility]  Primary Care Provider:  Pennie Roque  :  Ryalmitaamrit Benitez   Phone number:  [phone]  Relationship:  Queen Stoney

## 2021-01-15 NOTE — CARE COORDINATION
Ambulatory Care Coordination  ED Follow up Call    Reason for ED visit:  Ascites with associated abdominal pain   Status:     worsened    Did you call your PCP prior to going to the ED? Yes      Did you receive a discharge instructions from the Emergency Room? Yes  Review of Instructions:     Understands what to report/when to return?:  Yes   Understands discharge instructions?:  Yes   Following discharge instructions?:  Yes   If not why? Are there any new complaints of pain? No  New Pain Meds? No    Constipation prophylaxis needed? No    If you have a wound is the dressing clean, dry, and intact? N/A  Understands wound care regimen? N/A    Are there any other complaints/concerns that you wish to tell your provider? Abdominal pain 9/10  Denies need/want for ACM assistance for outreach to PCP stating, \"no one will give me anything to help me. I can call them myself\" . ...... Kathleen Severance \"they (hospital care team) would not even give me anything in the emergency, so I'm not going back there. \"    FU appts/Provider:    Future Appointments   Date Time Provider Steffanie Peguero   2/3/2021 11:30 AM [facility]   2/3/2021  2:00 PM [facility] PULMONARY FUNCTION TESTING [facility] [place] [facility]   2/23/2021  9:15 AM Deonte Cain MD CLERM PULM [facility]       New Medications?:   No    Medication Reconciliation by phone - No; pt declines line review, stating discomfort & 'already review this every time'  Understands Medications? Yes  Taking Medications? Yes  Can you swallow your pills? Yes    Any further needs in the home i.e. Equipment? No    Link to services in community?:  No   Which services:      Patient contacted regarding COVID-19 risk and screening. Discussed COVID-19 related testing which was available at this time. Test results were negative. Patient informed of results, if available? Yes. Care Transition Nurse/ Ambulatory Care Manager contacted the patient by telephone to perform follow-up assessment. Pt Rights - no further Care Transition outreach will be made.

## 2021-01-15 NOTE — CARE COORDINATION
Man who answered advised \"I'll have her call you right back in like 15 minutes ok? \"  Doritaamrit Shaw requested AC to text callback number to [phone] (same number attempted for this call, per primary number listed on pt record)  He added that \"I (he) should be there in about 15 minutes and I can have her call you back, then. \"    In absence of HIPAA consent to offer review w/the man this writer successfully reached, accepted the man's plan to have intended return callback to reach this writer today. Advised callback number is for today (Friday), noting this nurse is not on duty this weekend. Man assures he will see that pt returns call today.

## 2021-01-24 PROBLEM — K72.90 DECOMPENSATION OF CIRRHOSIS OF LIVER (HCC): Status: ACTIVE | Noted: 2021-01-01

## 2021-01-24 PROBLEM — K74.60 DECOMPENSATION OF CIRRHOSIS OF LIVER (HCC): Status: ACTIVE | Noted: 2021-01-01

## 2021-01-24 NOTE — H&P
inhaler Inhale 2 puffs into the lungs 4 times daily as needed for Wheezing 10/23/20   Ishan Green MD   GABAPENTIN PO Take 800 mg by mouth 3 times daily Unsure of dosage     Historical Provider, MD   QUEtiapine Fumarate (SEROQUEL PO) Take 100 mg by mouth nightly     Historical Provider, MD       Allergies:  Flexeril [cyclobenzaprine], Ultram [tramadol hcl], and Robaxin [methocarbamol]    Social History:  The patient currently lives at home. TOBACCO:   reports that she quit smoking about 4 months ago. She has a 40.00 pack-year smoking history. She has never used smokeless tobacco.  ETOH:   reports no history of alcohol use. Family History:   Positive as follows:        Problem Relation Age of Onset    Diabetes Father     Heart Disease Father        REVIEW OF SYSTEMS:     Constitutional: Negative for fever   HENT: Negative for sore throat   Eyes: Negative for redness   Respiratory: + dyspnea, cough productive of black sputum   Cardiovascular: Negative for chest pain   Gastrointestinal: Negative for vomiting, diarrhea, + abdominal distention   Genitourinary: Negative for hematuria   Musculoskeletal: Negative for arthralgias   Skin: Negative for rash   Neurological: Negative for syncope   Hematological: Negative for adenopathy   Psychiatric/Behavorial: Negative for anxiety    PHYSICAL EXAM:    /63   Pulse 78   Temp 97.8 °F (36.6 °C) (Oral)   Resp 22   Ht 5' 6\" (1.676 m)   Wt 139 lb (63 kg)   LMP 11/12/2012   SpO2 91%   BMI 22.44 kg/m²         General:  Middle aged female, chroically sick appearing  Awake, alert and oriented. Appears to be not in any distress  Mucous Membranes:  Pink , anicteric  Neck: No JVD, no carotid bruit, no thyromegaly  Chest:  Diminished alva with scattered wheeze .  No crackles  Cardiovascular:  RRR S1S2 heard, no murmurs or gallops  Abdomen: ++ distended, ++ tense ascites, non tender, no organomegaly, BS present  2 + edema extending from abd wall to thighs and lower ext   Extremities: . Distal pulses well felt  Neurological : grossly normal      CBC:   Recent Labs     01/24/21  0330   WBC 8.9   HGB 11.3*   HCT 34.2*   MCV 91.9        BMP:   Recent Labs     01/24/21 0415   *   K 4.3   CL 99   CO2 23   BUN 12   CREATININE <0.5*     LIVER PROFILE:   Recent Labs     01/24/21 0415   *   ALT 89*   LIPASE 45.0   BILITOT 1.4*   ALKPHOS 141*     PT/INR:   Recent Labs     01/24/21 0330   PROTIME 16.7*   INR 1.43*     APTT:   Recent Labs     01/24/21 0330   APTT 33.4     CARDIAC ENZYMES  Recent Labs     01/24/21 0415   TROPONINI <0.01     U/A:    Lab Results   Component Value Date    COLORU Yellow 05/26/2019    CLARITYU Clear 05/26/2019    SPECGRAV <=1.005 05/26/2019    LEUKOCYTESUR Negative 05/26/2019    BLOODU Negative 05/26/2019    GLUCOSEU Negative 05/26/2019     CULTURES    Blood cx x2: pending      RADIOLOGY    CT CHEST PULMONARY EMBOLISM W CONTRAST   Final Result   1. No pulmonary embolism. 2. Chronic bilateral pleural effusions with perhaps slight decrease in volume   on the right. There is significant atelectasis of the right lower lobe, also   slightly improved. 3. Patchy ground-glass opacification in the upper lobes. Change and pattern   would favor edema or chronic inflammatory process. 4. Cirrhosis and portal hypertension. Near occlusive thrombus in the main   portal vein just proximal to the portal confluence. 5. Massive ascites. 6. Limited evaluation of GI tract given above. Questionable mucosal edema of   the cecum and right colon. This may be artifact. Underlying colitis may be   considered clinically. CT ABDOMEN PELVIS W IV CONTRAST Additional Contrast? None   Final Result   1. No pulmonary embolism. 2. Chronic bilateral pleural effusions with perhaps slight decrease in volume   on the right. There is significant atelectasis of the right lower lobe, also   slightly improved.    3. Patchy ground-glass opacification in the upper lobes. Change and pattern   would favor edema or chronic inflammatory process. 4. Cirrhosis and portal hypertension. Near occlusive thrombus in the main   portal vein just proximal to the portal confluence. 5. Massive ascites. 6. Limited evaluation of GI tract given above. Questionable mucosal edema of   the cecum and right colon. This may be artifact. Underlying colitis may be   considered clinically. XR CHEST PORTABLE   Final Result   1. Interval increased volume of now moderate right-sided layering pleural   effusion with adjacent compressive right basilar atelectasis. 2. Diffuse mild interstitial prominence suggestive of mild interstitial edema.          IR US GUIDED PARACENTESIS    (Results Pending)     ASSESSMENT/PLAN:      Decompensated Cirrhosis of the Liver with ascites and abd distention     Pt with hx of Chronic Hepatitis C/B, reports no recent alcohol use   - CT abdomen: cirrhosis and portal HTN, new occlusive thrombus in main portal vein just proximal to the portal confluence, massive ascites  - Admitted to Med Surg  - plan for paracentesis in am   - resume diuresis lasix and aldactone   - GI consult for opinion of portal vein thrombosis       COPD AE   - dyspnea likely combination of copd flare up and worsening ascites   - CXR with interval increased volume of now moderate right sided layering pleural effusions with adjacent compressive right basilar atelectasis  - CT w/o PE  - start on steroids, HHN     Hyponatremia  - Na 127, in setting cirrhosis of the liver  - montior    Lactic Acidosis  - in setting of liver disease- monitor    Portal Vein Thrombosis-unsure new or chronic, need to d.w Radiology  GI to comment on anticoagulation   Consider eliquis       DVT Prophylaxis: lovenox  Diet: Diet NPO, After Midnight  DIET LOW FAT;  Code Status: Full Code      Audie Davis MD

## 2021-01-24 NOTE — PLAN OF CARE
64 yoF p/w abd pain/distension, SOB. Last paracentesis about 2 weeks ago, continues to drink. Hx of polysubstance abuse, Hep B/C, ETOH, cirrhosis.       Decompensated cirrhosis  Abd distension/ascites  ETOH  Hyponatremia  Elevated LFT

## 2021-01-24 NOTE — FLOWSHEET NOTE
01/24/21 1045   Vital Signs   Temp 97.8 °F (36.6 °C)   Temp Source Oral   Pulse 78   Heart Rate Source Monitor   Resp 16   /63   Patient Position Semi fowlers   Level of Consciousness Alert (0)   MEWS Score 1   Oxygen Therapy   SpO2 95 %   O2 Device None (Room air)   Patient admitted to room _235_ from ER. Patient oriented to room, call light, bed rails, phone, lights and bathroom. Patient instructed about the schedule of the day including: vital sign frequency, lab draws, possible tests, frequency of MD and staff rounds, daily weights, I &O's and prescribed diet. Bed alarm deferred patient low fall risk and refuses alarm. Telemetry box in place, patient aware of placement and reason. Bed locked, in lowest position, side rails up 2/4, call light within reach. Recliner Assessment:     Patient is able to demonstrate the ability to move from a reclining position to an upright position within the recliner. 4 Eyes Skin Assessment:     The patient is being assess for   Admission    I agree that 2 RN's have performed a thorough Head to Toe Skin Assessment on the patient. ALL assessment sites listed below have been assessed. Areas assessed by both nurses:   [x]   Head, Face, and Ears   [x]   Shoulders, Back, and Chest, Abdomen  [x]   Arms, Elbows, and Hands   [x]   Coccyx, Sacrum, and Ischium  [x]   Legs, Feet, and Heels        Scattered bruses    **SHARE this note so that the co-signing nurse is able to place an eSignature**    Co-signer eSignature: Electronically signed by Flora Hamman, RN on 1/24/21 at 11:12 AM EST    Does the Patient have Skin Breakdown?   No          Godfrey Prevention initiated:  No   Wound Care Orders initiated:  No      [facility] nurse consulted for Pressure Injury (Stage 3,4, Unstageable, DTI, NWPT, Complex wounds)and New or Established Ostomies:  No      Primary Nurse eSignature: Electronically signed by Flora Hamman, RN on 1/24/21 at 11:12 AM EST

## 2021-01-24 NOTE — PROGRESS NOTES
RESPIRATORY THERAPY ASSESSMENT    Name:  Jorge Landers Record Number:  [id number]  Age: 64 y.o. Gender: female  : 1960  Today's Date:  2021  Room:  [address] [place] [address]-    Assessment     Is the patient being admitted for a COPD or Asthma exacerbation? Yes   (If yes the patient will be seen every 4 hours for the first 24 hours and then reassessed)    Patient Admission Diagnosis      Allergies  Allergies   Allergen Reactions    Flexeril [Cyclobenzaprine]      Causes legs to be restless    Ultram [Tramadol Hcl]     Robaxin [Methocarbamol] Nausea And Vomiting       Minimum Predicted Vital Capacity:               Actual Vital Capacity:                    Pulmonary History:COPD  Home Oxygen Therapy:  room air  Home Respiratory Therapy:Albuterol PRN and Symbicort BID  Current Respiratory Therapy:  Albuterol PRN and Symbicort BID  Treatment Type: HHN  Medications: Albuterol    Respiratory Severity Index(RSI)   Patients with orders for inhalation medications, oxygen, or any therapeutic treatment modality will be placed on Respiratory Protocol. They will be assessed with the first treatment and at least every 72 hours thereafter. The following severity scale will be used to determine frequency of treatment intervention.     Smoking History: Mild Exacerbation = 3    Social History  Social History     Tobacco Use    Smoking status: Former Smoker     Packs/day: 1.00     Years: 40.00     Pack years: 40.00     Quit date: 2020     Years since quittin.3    Smokeless tobacco: Never Used   Substance Use Topics    Alcohol use: No    Drug use: Not Currently     Types: Methamphetamines, Marijuana       Recent Surgical History: None = 0  Past Surgical History  Past Surgical History:   Procedure Laterality Date    APPENDECTOMY      CARPAL TUNNEL RELEASE       SECTION      ROTATOR CUFF REPAIR Right     SKIN GRAFT      TUBAL LIGATION         Level of Consciousness: Alert, Oriented, and Cooperative = 0    Level of Activity: Walking unassisted = 0    Respiratory Pattern: Dyspnea with exertion;Irregular pattern;or RR less than 6 = 2    Breath Sounds: Absent bilaterally and/or with wheezes = 3    Sputum   ,  ,    Cough: Strong, spontaneous, non-productive = 0    Vital Signs   /63   Pulse 78   Temp 97.8 °F (36.6 °C) (Oral)   Resp 20   Ht 5' 6\" (1.676 m)   Wt 139 lb (63 kg)   LMP 11/12/2012   SpO2 93%   BMI 22.44 kg/m²   SPO2 (COPD values may differ): Greater than or equal to 92% on room air = 0    Peak Flow (asthma only): not applicable = 0    RSI: 7-8 = BID and Q4HPRN (every four hours as needed) for dyspnea        Plan       Goals: medication delivery, mobilize retained secretions, volume expansion and improve oxygenation    Patient/caregiver was educated on the proper method of use for Respiratory Care Devices:  Yes      Level of patient/caregiver understanding able to:   ? Verbalize understanding   ? Demonstrate understanding       ? Teach back        ? Needs reinforcement       ? No available caregiver               ? Other:     Response to education:  Excellent     Is patient being placed on Home Treatment Regimen? No     Does the patient have everything they need prior to discharge? Yes     Comments: Patient interviewed and assessed    Plan of Care: Albuterol HHN Q4 and Symbicort BID    Electronically signed by Pastor Mae RCP on 1/24/2021 at 11:38 AM    Respiratory Protocol Guidelines     1. Assessment and treatment by Respiratory Therapy will be initiated for medication and therapeutic interventions upon initiation of aerosolized medication. 2. Physician will be contacted for respiratory rate (RR) greater than 35 breaths per minute. Therapy will be held for heart rate (HR) greater than 140 beats per minute, pending direction from physician.   3. Bronchodilators will be administered via Metered Dose Inhaler (MDI) with spacer when the following criteria are wheezing by examination or by history for at least 24 hours, contact physician for possible discontinuation.

## 2021-01-24 NOTE — PROGRESS NOTES
Pt resting in bed quietly at this time. Denies any needs. All needs and call light within reach. Admin influenza shot.

## 2021-01-24 NOTE — ED NOTES
Report given to Lady Esau RN on situation, Hx, labs and scans.  PT stable; A/O x4     Salvador Shelton RN  01/24/21 7308

## 2021-01-24 NOTE — ED PROVIDER NOTES
[address] ED      CHIEF COMPLAINT  Shortness of Breath (Patient called ems due to sob from earlier tonight. Patient states that she needs a paracentesis. )       [facility]  Rene Fuentes is a 64 y.o. female with history of cirrhosis secondary to hepatitis B and C and ascites requiring paracentesis who presents to the ED for evaluation of soa. Patient reports having increasing ascites over the past several weeks. Says she was feeling increasingly short of breath since 7 PM.  Reports she also has COPD from prior smoking. She tried her last breathing treatments around noon with minimal improvement of symptoms. Denies wearing oxygen at home although she says she had conversations with her doctors in the past about maybe starting PRN oxygen. Reports last paracentesis was 3 to 4 weeks ago. Reports paracentesis before that was about 2 months prior to that. Reports getting paracentesis in hospitals. Denies having regular outpatient appointments for paracentesis. Denies any recent illnesses. Denies having any fevers, chills, cough, congestion. No other complaints, modifying factors or associated symptoms. I have reviewed the following from the nursing documentation.     Past Medical History:   Diagnosis Date    Back pain     COPD (chronic obstructive pulmonary disease) (HCC)     Hepatitis B     Hepatitis C antibody positive in blood 2018    Seizures (HCC)      Past Surgical History:   Procedure Laterality Date    APPENDECTOMY      CARPAL TUNNEL RELEASE       SECTION      ROTATOR CUFF REPAIR Right     SKIN GRAFT      TUBAL LIGATION       Family History   Problem Relation Age of Onset    Diabetes Father     Heart Disease Father      Social History     Socioeconomic History    Marital status:      Spouse name: Not on file    Number of children: Not on file    Years of education: Not on file    Highest education level: Not on file   Occupational Fumarate (SEROQUEL PO) Take 100 mg by mouth nightly        Allergies   Allergen Reactions    Flexeril [Cyclobenzaprine]      Causes legs to be restless    Ultram [Tramadol Hcl]     Robaxin [Methocarbamol] Nausea And Vomiting       REVIEW OF SYSTEMS  10 systems reviewed, pertinent positives per HPI otherwise noted to be negative. PHYSICAL EXAM  /70   Pulse 92   Resp 16   Ht 5' 6\" (1.676 m)   Wt 139 lb (63 kg)   LMP 11/12/2012   SpO2 96%   BMI 22.44 kg/m²    GENERAL APPEARANCE: Awake and alert. Ill-appearing  HENT: Normocephalic. Atraumatic. PERRL. EOMI. No facial droop. HEART/CHEST: RRR. LUNGS: Tachypneic. Increased work of breathing. Wheezing present. ABDOMEN: distended abdomen. Fluid wave present. Non tender to palpation. No guarding. No rebound. EXTREMITIES: No gross deformities. Moving all extremities. SKIN: Warm and dry. No acute rashes. NEUROLOGICAL: Alert and oriented. No gross facial drooping. Answering questions appropriately. Moving all extremities. PSYCHIATRIC: Pleasant. Normal mood and affect.     LABS  Results for orders placed or performed during the hospital encounter of 01/24/21   CBC Auto Differential   Result Value Ref Range    WBC 8.9 4.0 - 11.0 K/uL    RBC 3.73 (L) 4.00 - 5.20 M/uL    Hemoglobin 11.3 (L) 12.0 - 16.0 g/dL    Hematocrit 34.2 (L) 36.0 - 48.0 %    MCV 91.9 80.0 - 100.0 fL    MCH 30.4 26.0 - 34.0 pg    MCHC 33.1 31.0 - 36.0 g/dL    RDW 18.1 (H) 12.4 - 15.4 %    Platelets 924 963 - 668 K/uL    MPV 7.5 5.0 - 10.5 fL    Neutrophils % 82.0 %    Lymphocytes % 9.1 %    Monocytes % 8.3 %    Eosinophils % 0.3 %    Basophils % 0.3 %    Neutrophils Absolute 7.3 1.7 - 7.7 K/uL    Lymphocytes Absolute 0.8 (L) 1.0 - 5.1 K/uL    Monocytes Absolute 0.7 0.0 - 1.3 K/uL    Eosinophils Absolute 0.0 0.0 - 0.6 K/uL    Basophils Absolute 0.0 0.0 - 0.2 K/uL   Comprehensive Metabolic Panel   Result Value Ref Range    Sodium 127 (L) 136 - 145 mmol/L    Potassium 4.3 3.5 - 5.1 mmol/L    Chloride 99 99 - 110 mmol/L    CO2 23 21 - 32 mmol/L    Anion Gap 5 3 - 16    Glucose 214 (H) 70 - 99 mg/dL    BUN 12 7 - 20 mg/dL    CREATININE <0.5 (L) 0.6 - 1.1 mg/dL    GFR Non-African American >60 >60    GFR African American >60 >60    Calcium 8.4 8.3 - 10.6 mg/dL    Total Protein 6.8 6.4 - 8.2 g/dL    Alb 2.3 (L) 3.4 - 5.0 g/dL    Albumin/Globulin Ratio 0.5 (L) 1.1 - 2.2    Total Bilirubin 1.4 (H) 0.0 - 1.0 mg/dL    Alkaline Phosphatase 141 (H) 40 - 129 U/L    ALT 89 (H) 10 - 40 U/L     (H) 15 - 37 U/L    Globulin 4.5 g/dL   Troponin   Result Value Ref Range    Troponin <0.01 <0.01 ng/mL   Lipase   Result Value Ref Range    Lipase 45.0 13.0 - 60.0 U/L   Lactic Acid, Plasma   Result Value Ref Range    Lactic Acid 2.9 (H) 0.4 - 2.0 mmol/L   Blood Gas, Venous   Result Value Ref Range    pH, Dante 7.403 7.350 - 7.450    pCO2, Dante 39.0 (L) 40.0 - 50.0 mmHg    pO2, Dante 58.1 (H) 25.0 - 40.0 mmHg    HCO3, Venous 23.8 23.0 - 29.0 mmol/L    Base Excess, Dante -0.8 -3.0 - 3.0 mmol/L    O2 Sat, Dante 90 Not Established %    Carboxyhemoglobin 3.6 (H) 0.0 - 1.5 %    MetHgb, Dante 0.3 <1.5 %    TC02 (Calc), Dante 25 Not Established mmol/L    O2 Content, Dante 15 Not Established VOL %    O2 Therapy Unknown    Brain Natriuretic Peptide   Result Value Ref Range    Pro- (H) 0 - 124 pg/mL   Protime-INR   Result Value Ref Range    Protime 16.7 (H) 10.0 - 13.2 sec    INR 1.43 (H) 0.86 - 1.14   D-Dimer, Quantitative   Result Value Ref Range    D-Dimer, Quant >5250 (H) 0 - 229 ng/mL DDU   APTT   Result Value Ref Range    aPTT 33.4 24.2 - 36.2 sec   Ammonia   Result Value Ref Range    Ammonia 31 11 - 51 umol/L   COVID-19   Result Value Ref Range    SARS-CoV-2, NAAT Not Detected Not Detected   Procalcitonin   Result Value Ref Range    Procalcitonin 0.20 (H) 0.00 - 0.15 ng/mL   TYPE AND SCREEN   Result Value Ref Range    ABO/Rh A POS     Antibody Screen NEG        I have reviewed all labs for this visit.      ECG  The Ekg interpreted by me shows  Sinus tachycardia with a rate of 106  Axis is normal  QTc is normal  Intervals and Durations are unremarkable. ST Segments: Nonspecific ST changes  No significant change from prior EKG dated 1/14, 2021    RADIOLOGY  Ct Abdomen Pelvis W Iv Contrast Additional Contrast? None    Result Date: 1/14/2021  EXAMINATION: CT OF THE ABDOMEN AND PELVIS WITH CONTRAST 1/14/2021 12:39 pm TECHNIQUE: CT of the abdomen and pelvis was performed with the administration of intravenous contrast. Multiplanar reformatted images are provided for review. Dose modulation, iterative reconstruction, and/or weight based adjustment of the mA/kV was utilized to reduce the radiation dose to as low as reasonably achievable. COMPARISON: 11/03/2020 HISTORY: ORDERING SYSTEM PROVIDED HISTORY: abdominal pain TECHNOLOGIST PROVIDED HISTORY: Reason for exam:->abdominal pain Additional Contrast?->None Reason for Exam: ABDOMINAL PAIN/ ASCITES Acuity: Chronic Type of Exam: Initial FINDINGS: Lower Chest: Chronic small right pleural effusion and compressive right basilar atelectasis is unchanged from prior. Organs: The liver is cirrhotic in appearance. Several nonobstructing left intrarenal calculi are present, none of which measure greater than 1 mm. The remainder of the solid abdominal organs are unremarkable. Tiny gallstones layer dependently within the gallbladder. GI/Bowel: There is diffuse mucosal fold thickening throughout the small bowel. There is no bowel dilatation or obstruction. Pelvis: The bladder is decompressed and thus not evaluated. The uterus is grossly unremarkable aside from a small calcified fibroid. Peritoneum/Retroperitoneum: There is large volume ascites present throughout the abdomen and pelvis. Enumerable collateral vessels/varicosities are scattered throughout the abdominal cavity. Bones/Soft Tissues: There is no acute fracture or aggressive osseous lesion. 1. Cirrhosis with large volume ascites. 2. Small chronic right pleural effusion. 3. Cholelithiasis. 4. Nonobstructing left nephrolithiasis. Xr Chest Portable    Result Date: 1/24/2021  EXAMINATION: ONE XRAY VIEW OF THE CHEST 1/24/2021 2:57 am COMPARISON: Chest portable January 14, 2021. HISTORY: ORDERING SYSTEM PROVIDED HISTORY: other TECHNOLOGIST PROVIDED HISTORY: Reason for exam:->other Reason for Exam: SOB Acuity: Acute Type of Exam: Initial Additional signs and symptoms: SOB pt denies chest pain FINDINGS: The heart is normal in size and configuration. The mediastinal contours are within normal limits. Interval increased volume of now moderate right-sided layering pleural effusion is seen with adjacent compressive atelectasis present. Diffuse interstitial prominence is noted within the lungs. . Right 7th rib healed fracture is again evident. Bones and soft tissues are otherwise unremarkable. 1. Interval increased volume of now moderate right-sided layering pleural effusion with adjacent compressive right basilar atelectasis. 2. Diffuse mild interstitial prominence suggestive of mild interstitial edema. Xr Chest Portable    Result Date: 1/14/2021  EXAMINATION: ONE XRAY VIEW OF THE CHEST 1/14/2021 11:38 am COMPARISON: 10/21/2020 HISTORY: ORDERING SYSTEM PROVIDED HISTORY: shortness of breath TECHNOLOGIST PROVIDED HISTORY: Reason for exam:->shortness of breath Reason for Exam: SOB Acuity: Acute Type of Exam: Initial FINDINGS: There is a small right pleural effusion with bibasilar atelectasis. The cardiac silhouette is within normal limits. There is no pneumothorax. There is an old healed right rib fracture. 1. Small right pleural effusion.      Us Guided Paracentesis    Result Date: 1/14/2021  PROCEDURE: ULTRASOUND GUIDED PARACENTESIS 1/14/2021 HISTORY: ORDERING SYSTEM PROVIDED HISTORY: Large Acites TECHNOLOGIST PROVIDED HISTORY: Reason for exam:->Large Acites TECHNIQUE: Informed consent was obtained after a detailed explanation of the procedure including risks, benefits, and alternatives. Universal protocol was followed. The right abdomen was prepped and draped in sterile fashion and local anesthesia was achieved with lidocaine. A 5 Bulgarian needle sheath was advanced under ultrasound guidance into ascites and paracentesis was performed. The patient tolerated the procedure well. FINDINGS: A total of 2000 mL was removed. Successful ultrasound guided paracentesis. ED COURSE/MDM  Patient seen and evaluated. At presentation, patient was awake, alert, tachycardic to 107, tachypneic, had increased work of breathing with subcostal retractions, had wheezing on auscultation, and maintaining sat above 90 on room air. Abdomen was distended and fluid wave present. Differential diagnosis for the shortness of breath includes COVID-19, pneumonia, COPD exacerbation, ACS, arrhythmia, HF, decompensated cirrhosis, among others. Patient given DuoNeb and Decadron. On reassessment, patient reported somewhat improved symptoms. COVID-19 negative. BNP within normal limits. Troponin less than 0.01. vbg shows co2 39, normal ph. Chest x-ray shows interval increase volume of now moderate right-sided layering of the pleural effusion with adjacent compressive right basilar atelectasis. On reassessment, patient reported improved symptoms after Decadron and Solu-Medrol. Patient maintaining sat above 90 on room air. D dimer elevated >5250. CT PE showed no PE, chronic bilateral pleural effusions, significant atelectasis of the right lower lob. CT abdomen shows cirrhosis and portal hypertension and massive ascites. Labs indicative of decompensated cirrhosis with sodium 127, INR 1.43. I suspect patient's symptoms secondary to decompensated cirrhosis causing increasing ascites and pleural effusion causing increasing shortness of breath. Hospitalist consulted for admission. I provided at least 30 minutes of critical care excluding separately billable procedures. Pt was seen during the [place] 19 pandemic. Appropriate PPE worn by ME during patient encounters. Pt seen during a time with constrained hospital bed capacity and other potential inpatient and outpatient resources were constrained due to the viral pandemic. During the patient's ED course, the patient was given:  Medications   0.9 % sodium chloride bolus (500 mLs Intravenous New Bag 1/24/21 0344)   dexamethasone (PF) (DECADRON) injection 10 mg (10 mg Intravenous Given 1/24/21 0345)   ipratropium-albuterol (DUONEB) nebulizer solution 3 ampule (3 ampules Inhalation Given 1/24/21 0345)        CLINICAL IMPRESSION  1. Decompensation of cirrhosis of liver ([facility] [address]    2. COPD exacerbation (HCC)        Blood pressure 112/70, pulse 92, resp. rate 16, height 5' 6\" (1.676 m), weight 139 lb (63 kg), last menstrual period 11/12/2012, SpO2 96 %. [address] [facility] was admitted to the hospital.       Patient was given scripts for the following medications. I counseled patient how to take these medications. New Prescriptions    No medications on file       Follow-up with:  No follow-up provider specified. DISCLAIMER: This chart was created using Dragon dictation software. Efforts were made by me to ensure accuracy, however some errors may be present due to limitations of this technology and occasionally words are not transcribed correctly.        Tamica Lara MD  01/24/21 2025

## 2021-01-25 NOTE — CARE COORDINATION
Case Management Assessment  Initial Evaluation      Patient Name: Diamond Velazquez  YOB: 1960  Diagnosis: Decompensation of cirrhosis of liver ([facility] [K72.90, D44.14]  Date / Time: 1/24/2021  2:03 AM    Admission status/Date: IP 01/24/2021  Chart Reviewed: Yes      Patient Interviewed: Yes   Family Interviewed:  No      Hospitalization in the last 30 days:  No      Health Care Decision Maker :   Pt states that she does not want her mother or friend called regarding her health care  Defers medical decision making to her friend Shanel Angel [phone]   (CM - must 1st enter selection under Navigator - emergency contact- [place] Relationship and pick relationship)   Who do you trust or have selected to make healthcare decisions for you      Met with: patient  Kasey Ramos conducted  (bedside/phone): phone    Current PCP: Celine Gooden, [address] required for SNF : WAIVED         3 night stay required - NA    ADLS  Support Systems/Care Needs:    Transportation: friend    Meal Preparation: self    Housing  Living Arrangements: trailer w/friend  Steps: NA  Intent for return to present living arrangements: YES  Identified Issues: [address] [facility] with [address] : No Agency:(Services)     Passport/Waiver : No  :                      Phone Number:    Passport/Waiver Services: NA       Durable Medical Equiptment   DME Provider:   Equipment:   Walker___Cane___RTS___ BSC___Shower Chair___Hospital Bed___W/C____Other________  02 at ____Liter(s)---wears(frequency)_______ HHN _X__ CPAP___ BiPap___   N/A____      Home O2 Use :  No  - pt states that she feels she needs home O2 as she gets SOB frequently. CM explained that pt will need further testing to qualify. Community Service Affiliation  Dialysis:  No    · Agency:  · Location:  · Dialysis Schedule:  · Phone:   · Fax:     Other Community Services: (ex:PT/OT,Mental Health,Wound Clinic, Cardio/Pul [facility])    DISCHARGE PLAN: Explained Case Management role/services. Chart reviewed. Met with pt via phone explained the role of the CM. Plans to return home. Requests eval for home O2 r/t SOB. Denies any HHC needs. +CM will follow and reassess.

## 2021-01-25 NOTE — PROGRESS NOTES
RESPIRATORY THERAPY ASSESSMENT    Name:  Jorge Landers Record Number:  [id number]  Age: 64 y.o. Gender: female  : 1960  Today's Date:  2021  Room:  [address] [place], [place] [address]    Assessment     Is the patient being admitted for a COPD or Asthma exacerbation? yes}   (If yes the patient will be seen every 4 hours for the first 24 hours and then reassessed)    Patient Admission Diagnosis      Allergies  Allergies   Allergen Reactions    Flexeril [Cyclobenzaprine]      Causes legs to be restless    Ultram [Tramadol Hcl]     Robaxin [Methocarbamol] Nausea And Vomiting       Minimum Predicted Vital Capacity:     na          Actual Vital Capacity:      na              Pulmonary History:COPD  Home Oxygen Therapy:  room air  Home Respiratory Therapy:Albuterol PRN symbicort  Current Respiratory Therapy:  Symbi and alb hhn q4  Treatment Type: HHN  Medications: Albuterol    Respiratory Severity Index(RSI)   Patients with orders for inhalation medications, oxygen, or any therapeutic treatment modality will be placed on Respiratory Protocol. They will be assessed with the first treatment and at least every 72 hours thereafter. The following severity scale will be used to determine frequency of treatment intervention.     Smoking History: Pulmonary Disease or Smoking History, Greater than 15 pack year = 2    Social History  Social History     Tobacco Use    Smoking status: Former Smoker     Packs/day: 1.00     Years: 40.00     Pack years: 40.00     Quit date: 2020     Years since quittin.4    Smokeless tobacco: Never Used   Substance Use Topics    Alcohol use: No    Drug use: Not Currently     Types: Methamphetamines, Marijuana       Recent Surgical History: None = 0  Past Surgical History  Past Surgical History:   Procedure Laterality Date    APPENDECTOMY      CARPAL TUNNEL RELEASE       SECTION      ROTATOR CUFF REPAIR Right     SKIN GRAFT      TUBAL LIGATION         Level of Consciousness: Alert, Oriented, and Cooperative = 0    Level of Activity: Walking unassisted = 0    Respiratory Pattern: Dyspnea with exertion;Irregular pattern;or RR less than 6 = 2    Breath Sounds: Absent bilaterally and/or with wheezes = 3    Sputum   ,  , Sputum How Obtained: Spontaneous cough  Cough: Strong, spontaneous, non-productive = 0    Vital Signs   /69   Pulse 91   Temp 98.1 °F (36.7 °C) (Oral)   Resp 16   Ht 5' 6\" (1.676 m)   Wt 139 lb (63 kg)   LMP 11/12/2012   SpO2 94%   BMI 22.44 kg/m²   SPO2 (COPD values may differ): 90-91% on room air or greater than 92% on FiO2 24- 28% = 1    Peak Flow (asthma only): not applicable = 0    RSI: 7-8 = BID and Q4HPRN (every four hours as needed) for dyspnea        Plan       Goals: medication delivery, mobilize retained secretions, volume expansion and improve oxygenation    Patient/caregiver was educated on the proper method of use for Respiratory Care Devices:  Yes      Level of patient/caregiver understanding able to:   ? Verbalize understanding   ? Demonstrate understanding       ? Teach back        ? Needs reinforcement       ? No available caregiver               ? Other:     Response to education:  Excellent     Is patient being placed on Home Treatment Regimen? No     Does the patient have everything they need prior to discharge? NA     Comments: chart reviewed    Plan of Care: Alb HHN Q4 due to wheezing     Electronically signed by Gerson Perez RCP on 1/25/2021 at 4:20 PM    Respiratory Protocol Guidelines     1. Assessment and treatment by Respiratory Therapy will be initiated for medication and therapeutic interventions upon initiation of aerosolized medication. 2. Physician will be contacted for respiratory rate (RR) greater than 35 breaths per minute. Therapy will be held for heart rate (HR) greater than 140 beats per minute, pending direction from physician.   3. Bronchodilators will be administered via Metered Dose Inhaler (MDI) with spacer when the following criteria are met:  a. Alert and cooperative     b. HR < 140 bpm  c. RR < 30 bpm                d. Can demonstrate a 2-3 second inspiratory hold  4. Bronchodilators will be administered via Hand Held Nebulizer [facility]) to patients when ANY of the following criteria are met  a. Incognizant or uncooperative          b. Patients treated with HHN at Home        c. Unable to demonstrate proper use of MDI with spacer     d. RR > 30 bpm   5. Bronchodilators will be delivered via Metered Dose Inhaler (MDI), HHN, Aerogen to intubated patients on mechanical ventilation. 6. Inhalation medication orders will be delivered and/or substituted as outlined below. Aerosolized Medications Ordering and Administration Guidelines:    1. All Medications will be ordered by a physician, and their frequency and/or modality will be adjusted as defined by the patients Respiratory Severity Index (RSI) score. 2. If the patient does not have documented COPD, consider discontinuing anticholinergics when RSI is less than 9.  3. If the bronchospasm worsens (increased RSI), then the bronchodilator frequency can be increased to a maximum of every 4 hours. If greater than every 4 hours is required, the physician will be contacted. 4. If the bronchospasm improves, the frequency of the bronchodilator can be decreased, based on the patient's RSI, but not less than home treatment regimen frequency. 5. Bronchodilator(s) will be discontinued if patient has a RSI less than 9 and has received no scheduled or as needed treatment for 72  Hrs. Patients Ordered on a Mucolytic Agent:    1. Must always be administered with a bronchodilator. 2. Discontinue if patient experiences worsened bronchospasm, or secretions have lessened to the point that the patient is able to clear them with a cough. Anti-inflammatory and Combination Medications:    1.  If the patient lacks prior history of lung disease, is not using inhaled anti-inflammatory medication at home, and lacks wheezing by examination or by history for at least 24 hours, contact physician for possible discontinuation.

## 2021-01-25 NOTE — PROGRESS NOTES
Progress Note    Admit Date:  1/24/2021    64 y.o. female with a PMH of COPD, Chronic Hepatitis C and B, Cirrhosis of the Liver who presented to [facility] ED with complaint of restive increasing shortness of breath. Patient was admitted for decompensated cirrhosis and COPD exacerbation. GI was consulted. Subjective:  Ms. Jonathon Bence c/o abd pain   S/p Paracentesis. 2 L removed    Objective:   Patient Vitals for the past 4 hrs:   BP Temp Temp src Pulse Resp SpO2   01/25/21 0801 (!) 98/53 98.2 °F (36.8 °C) Oral 98 16 92 %   01/25/21 0745 -- -- -- -- -- 92 %            Intake/Output Summary (Last 24 hours) at 1/25/2021 1053  Last data filed at 1/24/2021 1449  Gross per 24 hour   Intake 260 ml   Output --   Net 260 ml       Physical Exam:  General:  Middle aged female, chroically sick appearing  Awake, alert and oriented. Appears to be not in any distress  Mucous Membranes:  Pink , anicteric  Neck: No JVD, no carotid bruit, no thyromegaly  Chest:  Diminished alva with scattered wheeze .  No crackles  Cardiovascular:  RRR S1S2 heard, no murmurs or gallops  Abdomen: ++ distended, ++ tense ascites, non tender, no organomegaly, BS present  2 + edema extending from abd wall to thighs and lower ext   Extremities: . Distal pulses well felt  Neurological : grossly normal    Data:  CBC:   Recent Labs     01/24/21 0330 01/25/21  0631   WBC 8.9 12.9*   HGB 11.3* 11.0*   HCT 34.2* 33.1*   MCV 91.9 91.3    161     BMP:   Recent Labs     01/24/21 0415 01/25/21  0631   * 131*   K 4.3 4.2   CL 99 101   CO2 23 25   BUN 12 15   CREATININE <0.5* <0.5*     LIVER PROFILE:   Recent Labs     01/24/21 0415 01/25/21  0631   * 85*   ALT 89* 78*   LIPASE 45.0  --    BILITOT 1.4* 1.1*   ALKPHOS 141* 147*     PT/INR:   Recent Labs     01/24/21 0330   PROTIME 16.7*   INR 1.43*       CULTURES  Blood cx x2: pending  Rapid COVID: not detected    RADIOLOGY  US GUIDED PARACENTESIS   Final Result   Successful ultrasound guided CT abdomen: cirrhosis and portal HTN, new occlusive thrombus in main portal vein just proximal to the portal confluence, massive ascites  - Admitted to Med Surg  - s/p paracentesis this morning. 2 L removed. - resume diuresis lasix and aldactone   - GI consult for opinion of portal vein thrombosis      COPD AE   - dyspnea likely combination of copd flare up and worsening ascites   - CXR with interval increased volume of now moderate right sided layering pleural effusions with adjacent compressive right basilar atelectasis  - CT w/o PE  - started on steroids, HHN      Hyponatremia  - Na 127, in setting cirrhosis of the liver  - monitor, improved to 131     Lactic Acidosis  - in setting of liver disease- monitor     Portal Vein Thrombosis-unsure new or chronic, need to d.w Radiology  GI to comment on anticoagulation   Consider eliquis      DVT Prophylaxis: lovenox  Diet: DIET LOW FAT;  Code Status: Full Code      JERSON Casas.

## 2021-01-25 NOTE — PLAN OF CARE
Problem: Safety:  Goal: Free from accidental physical injury  Description: Free from accidental physical injury  Outcome: Ongoing  Goal: Free from intentional harm  Description: Free from intentional harm  Outcome: Ongoing     Problem: Daily Care:  Goal: Daily care needs are met  Description: Daily care needs are met  Outcome: Ongoing     Problem: Pain:  Goal: Patient's pain/discomfort is manageable  Description: Patient's pain/discomfort is manageable  Outcome: Ongoing     Problem: Skin Integrity:  Goal: Skin integrity will stabilize  Description: Skin integrity will stabilize  Outcome: Ongoing     Problem: Discharge Planning:  Goal: Patients continuum of care needs are met  Description: Patients continuum of care needs are met  Outcome: Ongoing

## 2021-01-25 NOTE — PROGRESS NOTES
Pt requesting to be put on O2. Pt states she needs it to help with her breathing. Pt appears to be in no resp. distress. SpO2=94% on RA.   Placed pt on 2L NC per her request.

## 2021-01-25 NOTE — PROGRESS NOTES
Received consult for 65 yo female with decompenstated cirrhosis and newly diagnosed portal vein thrombosis. Full consult to follow. Would recommend anticoagulation for new thrombus however would recommend EGD with potential variceal banding prior to starting.

## 2021-01-26 NOTE — ANESTHESIA POSTPROCEDURE EVALUATION
Department of Anesthesiology  Postprocedure Note    Patient: Jimmy Harmon  MRN: [id number]  YOB: 1960  Date of evaluation: 1/26/2021  Time:  3:38 PM     Procedure Summary     Date: 01/26/21 Room / Location: [facility] ENDO 01 / [facility]    Anesthesia Start: [phone] Anesthesia Stop: 7986    Procedure: EGD BIOPSY (N/A ) Diagnosis: (?)    Surgeons: Pennie Pepper DO Responsible Provider: Orion Dandy, MD    Anesthesia Type: TIVA ASA Status: 3          Anesthesia Type: TIVA    Samm Phase I: Samm Score: 10    Samm Phase II: Samm Score: 9    Last vitals: Reviewed and per EMR flowsheets.        Anesthesia Post Evaluation    Patient location during evaluation: bedside  Level of consciousness: awake  Airway patency: patent  Nausea & Vomiting: no nausea  Complications: no  Cardiovascular status: blood pressure returned to baseline  Respiratory status: acceptable  Hydration status: euvolemic

## 2021-01-26 NOTE — H&P
Gastroenterology Preop Assessment    Patient:   Amy Moore   :    1960   Facility:   [facility]  Referring/PCP: Mane Garza  Date:     2021    Subjective:   Procedure: EGD    HPI/Reason for procedure:  Patient with portal vein thrombus. Planning EGD with possible variceal banding. Past Medical History:   Diagnosis Date    Back pain     COPD (chronic obstructive pulmonary disease) (HCC)     Hepatitis B     Hepatitis C antibody positive in blood 2018    Seizures (HCC)      Past Surgical History:   Procedure Laterality Date    APPENDECTOMY      CARPAL TUNNEL RELEASE       SECTION      ROTATOR CUFF REPAIR Right     SKIN GRAFT      TUBAL LIGATION         Social:   Social History     Tobacco Use    Smoking status: Former Smoker     Packs/day: 1.00     Years: 40.00     Pack years: 40.00     Quit date: 2020     Years since quittin.4    Smokeless tobacco: Never Used   Substance Use Topics    Alcohol use: No     Family:   Family History   Problem Relation Age of Onset    Diabetes Father     Heart Disease Father        Scheduled Medications:    apixaban  10 mg Oral BID    predniSONE  40 mg Oral Daily    albumin human  25 g Intravenous Q8H    spironolactone  50 mg Oral BID    budesonide-formoterol  1 puff Inhalation BID    sodium chloride flush  10 mL Intravenous 2 times per day    albuterol  2.5 mg Nebulization Q4H    furosemide  20 mg Intravenous BID    gabapentin  400 mg Oral TID    QUEtiapine  50 mg Oral Nightly       Current Medications:    Prior to Admission medications    Medication Sig Start Date End Date Taking?  Authorizing Provider   SYMBICORT 80-4.5 MCG/ACT AERO  20   Historical Provider, MD   spironolactone (ALDACTONE) 50 MG tablet TAKE 1 TABLET BY MOUTH TWICE A DAY 20   Historical Provider, MD   albuterol sulfate HFA (VENTOLIN HFA) 108 (90 Base) MCG/ACT inhaler Inhale 2 puffs into the lungs 4 times daily as needed for Wheezing 10/23/20   Jhonatan Laboy MD   GABAPENTIN PO Take 800 mg by mouth 3 times daily Unsure of dosage     Historical Provider, MD   QUEtiapine Fumarate (SEROQUEL PO) Take 100 mg by mouth nightly     Historical Provider, MD         Current Facility-Administered Medications:     apixaban (ELIQUIS) tablet 10 mg, 10 mg, Oral, BID, Nisha Liz MD    oxyCODONE (ROXICODONE) immediate release tablet 5 mg, 5 mg, Oral, Q6H PRN, Nisha Liz MD, 5 mg at 01/26/21 0838    predniSONE (DELTASONE) tablet 40 mg, 40 mg, Oral, Daily, Nisha Liz MD, 40 mg at 01/26/21 0838    albumin human 25 % IV solution 25 g, 25 g, Intravenous, Q8H, Meghan Peña DO, Last Rate: 100 mL/hr at 01/26/21 0824, 25 g at 01/26/21 0824    albuterol (PROVENTIL) nebulizer solution 2.5 mg, 2.5 mg, Nebulization, Q6H PRN, Regina Soni MD, 2.5 mg at 01/24/21 1133    spironolactone (ALDACTONE) tablet 50 mg, 50 mg, Oral, BID, Regina Soni MD, 50 mg at 01/26/21 0838    budesonide-formoterol (SYMBICORT) 80-4.5 MCG/ACT inhaler 1 puff, 1 puff, Inhalation, BID, Regina Soni MD, 1 puff at 01/26/21 0738    sodium chloride flush 0.9 % injection 10 mL, 10 mL, Intravenous, 2 times per day, Regina Soni MD, 10 mL at 01/26/21 4948    sodium chloride flush 0.9 % injection 10 mL, 10 mL, Intravenous, PRN, Regina Soni MD    polyethylene glycol [facility]) packet 17 g, 17 g, Oral, Daily PRN, Regina Soni MD    acetaminophen (TYLENOL) tablet 650 mg, 650 mg, Oral, Q6H PRN **OR** acetaminophen (TYLENOL) suppository 650 mg, 650 mg, Rectal, Q6H PRN, Regina Soni MD    potassium chloride (KLOR-CON M) extended release tablet 40 mEq, 40 mEq, Oral, PRN **OR** potassium bicarb-citric acid (EFFER-K) effervescent tablet 40 mEq, 40 mEq, Oral, PRN **OR** potassium chloride 10 mEq/100 mL IVPB (Peripheral Line), 10 mEq, Intravenous, PRN, Regina Soni MD    magnesium sulfate 2000 mg in 50 mL IVPB premix, 2,000 mg, Intravenous, PRN, Corey Gandhi MD    ondansetron [facility]) injection 4 mg, 4 mg, Intravenous, Q6H PRN, Corey Gandhi MD, 4 mg at 01/26/21 0823    albuterol (PROVENTIL) nebulizer solution 2.5 mg, 2.5 mg, Nebulization, Q4H, Damari Harley MD, 2.5 mg at 01/26/21 1106    furosemide (LASIX) injection 20 mg, 20 mg, Intravenous, BID, Jorge Henry MD, 20 mg at 01/26/21 7460    gabapentin (NEURONTIN) capsule 400 mg, 400 mg, Oral, TID, Jorge Henry MD, 400 mg at 01/26/21 9915    QUEtiapine (SEROQUEL) tablet 50 mg, 50 mg, Oral, Nightly, Jorge Henry MD, 50 mg at 01/25/21 2028      Infusions:   PRN Medications: oxyCODONE, albuterol, sodium chloride flush, polyethylene glycol, acetaminophen **OR** acetaminophen, potassium chloride **OR** potassium alternative oral replacement **OR** potassium chloride, magnesium sulfate, ondansetron  Allergies:    Allergies   Allergen Reactions    Flexeril [Cyclobenzaprine]      Causes legs to be restless    Ultram [Tramadol Hcl]     Robaxin [Methocarbamol] Nausea And Vomiting         Objective:     Physical Exam:   /67   Pulse 77   Temp 99.1 °F (37.3 °C) (Temporal)   Resp 20   Ht 5' 6\" (1.676 m)   Wt 139 lb (63 kg)   LMP 11/12/2012   SpO2 97%   BMI 22.44 kg/m²     HEENT: NCAT  Lungs: CTAB  CV: RRR  Abd: soft, ntd  Ext: dpi    Lab and Imaging Review   Labs:  CBC:   Recent Labs     01/24/21  0330 01/25/21  0631   WBC 8.9 12.9*   HGB 11.3* 11.0*   HCT 34.2* 33.1*   MCV 91.9 91.3    161     BMP:   Recent Labs     01/24/21  0415 01/25/21  0631   * 131*   K 4.3 4.2   CL 99 101   CO2 23 25   BUN 12 15   CREATININE <0.5* <0.5*     LIVER PROFILE:   Recent Labs     01/24/21  0415 01/25/21  0631   * 85*   ALT 89* 78*   LIPASE 45.0  --    PROT 6.8 6.6   BILITOT 1.4* 1.1*   ALKPHOS 141* 147*     PT/INR:   Recent Labs     01/24/21  0330   INR 1.43*       Pre-Procedure Assessment / Plan:  ASA: Class 3 - A patient with severe systemic disease that limits activity but is not incapacitating  Airway: Mallampati: II (soft palate, uvula, fauces visible)  Level of Sedation Plan:Deep sedation  Post Procedure plan: Return to same level of care      Plan:   1. EGD with variceal banding    I assessed the patient and find that the patient is in satisfactory condition to proceed with the planned procedure and sedation plan. I have explained the risk, benefits, and alternatives to the procedure; the patient understands and agrees to proceed.        Mara Deaner  1/26/2021

## 2021-01-26 NOTE — CONSULTS
Gastroenterology Consult Note    Patient:   Trevor Maharaj   :    1960   Facility:     [address] [facility] [id number]   Referring/PCP: Maryanne Wang  Date:     2021  Consultant:   Nathaly Holland DO    Subjective: This 64 y.o. female was admitted 2021 with a diagnosis of \"Decompensation of cirrhosis of liver ([facility] [K72.90, K74.60]\" and is seen in consultation regarding  Cirrhosis    65 yo female with history of hep b/c cirrhosis, COPD with shortness of breath. Patient with significant abdominal distension. CT scan demonstrated new portal vein thrombosis. Denies history of EGD. Had 2 liters of ascites drained. Abdomen still tense and distended. Lower extremity edema.     Past Medical History:   Diagnosis Date    Back pain     COPD (chronic obstructive pulmonary disease) (HCC)     Hepatitis B     Hepatitis C antibody positive in blood 2018    Seizures (HCC)      Past Surgical History:   Procedure Laterality Date    APPENDECTOMY      CARPAL TUNNEL RELEASE       SECTION      ROTATOR CUFF REPAIR Right     SKIN GRAFT      TUBAL LIGATION         Social:   Social History     Tobacco Use    Smoking status: Former Smoker     Packs/day: 1.00     Years: 40.00     Pack years: 40.00     Quit date: 2020     Years since quittin.4    Smokeless tobacco: Never Used   Substance Use Topics    Alcohol use: No     Family:   Family History   Problem Relation Age of Onset    Diabetes Father     Heart Disease Father        Scheduled Medications:    [START ON 2021] predniSONE  40 mg Oral Daily    spironolactone  50 mg Oral BID    budesonide-formoterol  1 puff Inhalation BID    sodium chloride flush  10 mL Intravenous 2 times per day    enoxaparin  40 mg Subcutaneous Daily    albuterol  2.5 mg Nebulization Q4H    furosemide  20 mg Intravenous BID    gabapentin  400 mg Oral TID    QUEtiapine  50 mg Oral Nightly     Infusions:   PRN Medications: oxyCODONE, albuterol, sodium chloride flush, polyethylene glycol, acetaminophen **OR** acetaminophen, potassium chloride **OR** potassium alternative oral replacement **OR** potassium chloride, magnesium sulfate, ondansetron  Allergies: Allergies   Allergen Reactions    Flexeril [Cyclobenzaprine]      Causes legs to be restless    Ultram [Tramadol Hcl]     Robaxin [Methocarbamol] Nausea And Vomiting       ROS:   Review of Systems   Constitutional: Negative. HENT: Negative. Eyes: Negative. Respiratory: Positive for shortness of breath. Cardiovascular: Negative. Gastrointestinal: Positive for abdominal distention and abdominal pain. Endocrine: Negative. Genitourinary: Negative. Allergic/Immunologic: Negative. Neurological: Negative. Hematological: Negative. Psychiatric/Behavioral: Negative. Objective:     Physical Exam:   Vitals:    01/25/21 2007   BP: (!) 114/53   Pulse: 94   Resp: 18   Temp: 98.6 °F (37 °C)   SpO2: 96%     I/O last 3 completed shifts:   In: 5 [P.O.:720]  Out: -    General appearance: alert, appears stated age and cooperative  HEENT: PERRLA  Neck: no adenopathy, no carotid bruit, no JVD, supple, symmetrical, trachea midline and thyroid not enlarged, symmetric, no tenderness/mass/nodules  Lungs: clear to auscultation bilaterally  Heart: regular rate and rhythm, S1, S2 normal, no murmur, click, rub or gallop  Abdomen: soft, non-tender; bowel sounds normal; no masses,  no organomegaly  Extremities: extremities normal, atraumatic, no cyanosis or edema     Lab and Imaging Review   Labs:  CBC:   Recent Labs     01/24/21  0330 01/25/21  0631   WBC 8.9 12.9*   HGB 11.3* 11.0*   HCT 34.2* 33.1*   MCV 91.9 91.3    161     BMP:   Recent Labs     01/24/21  0415 01/25/21  0631   * 131*   K 4.3 4.2   CL 99 101   CO2 23 25   BUN 12 15   CREATININE <0.5* <0.5*     LIVER PROFILE:   Recent Labs 01/24/21  0415 01/25/21  0631   * 85*   ALT 89* 78*   LIPASE 45.0  --    PROT 6.8 6.6   BILITOT 1.4* 1.1*   ALKPHOS 141* 147*     PT/INR:   Recent Labs     01/24/21  0330   INR 1.43*       IMAGING:  Ct Abdomen Pelvis W Iv Contrast Additional Contrast? None    Result Date: 1/24/2021  EXAMINATION: CTA OF THE CHEST; CT OF THE ABDOMEN AND PELVIS WITH CONTRAST 1/24/2021 6:49 am TECHNIQUE: CTA of the chest was performed after the administration of intravenous contrast.  Multiplanar reformatted images are provided for review. MIP images are provided for review. Dose modulation, iterative reconstruction, and/or weight based adjustment of the mA/kV was utilized to reduce the radiation dose to as low as reasonably achievable.; CT of the abdomen and pelvis was performed with the administration of intravenous contrast. Multiplanar reformatted images are provided for review. Dose modulation, iterative reconstruction, and/or weight based adjustment of the mA/kV was utilized to reduce the radiation dose to as low as reasonably achievable. COMPARISON: 10/19/2020 and 01/14/2021 CT HISTORY: ORDERING SYSTEM PROVIDED HISTORY: elevated d dimer. soa TECHNOLOGIST PROVIDED HISTORY: Reason for exam:->elevated d dimer. soa Decision Support Exception->Emergency Medical Condition (MA) Reason for Exam: sob, abd distention Acuity: Unknown Type of Exam: Unknown; ORDERING SYSTEM PROVIDED HISTORY: distended abdomen. TECHNOLOGIST PROVIDED HISTORY: Reason for exam:->distended abdomen. Additional Contrast?->None Reason for Exam: sob, abd distention FINDINGS: Chest: Pulmonary arteries: Study is of good technical quality for evaluation of pulmonary embolism. There are no filling defects to suggest pulmonary embolism. Main pulmonary artery is normal in caliber. No evidence of right ventricular strain.  Mediastinum: The heart size is normal.  The aorta is normal.  Ill-defined mediastinal and hilar lymph nodes do not appear to be significantly enlarged, similar to prior CT. Thyroid and esophagus are unremarkable. Lungs/pleura: Airways are patent. Moderate peribronchial thickening in the bilateral lower lobes. Large right pleural effusion appears chronic with significant atelectasis of the right lower lobe. Perhaps slight decrease in volume of fluid and improved aeration of the superior segment since prior CT. Small left pleural effusion stable. Moderate underlying centrilobular emphysema. Scattered ground-glass airspace opacification in the bilateral upper lobes with distribution and extent different from prior CT. Overall slight improvement. No discrete mass or nodule. Soft Tissues/Bones: Remote T11 compression fracture stable with no interval detrimental change. Abdomen/Pelvis: Organs: Cirrhotic morphology of the liver with no focal lesion detected on current exam.  There is dilation of the portal veins with near occlusive thrombus in close proximity to the portal confluence that is new from prior imaging. The spleen is not enlarged. Massive ascites throughout the peritoneum. Cholelithiasis without biliary dilatation. Pancreas is normal. The adrenal glands are normal.  Punctate nonobstructing calculi are present in both kidneys. GI/Bowel: Limited evaluation of GI tract given pattern of ascites. No obvious mucosal abnormality proximally. The appendix is not clearly identified. Questionable mucosal edema involving the cecum and right colon which is difficult to characterize. Pelvis: The bladder is distended. No mucosal thickening. The uterus and adnexa are normal.  Calcified leiomyoma demonstrated. Peritoneum/Retroperitoneum: The aorta tapers normally. No lymphadenopathy. Bones/Soft Tissues: No significant skeletal abnormalities. 1. No pulmonary embolism. 2. Chronic bilateral pleural effusions with perhaps slight decrease in volume on the right. There is significant atelectasis of the right lower lobe, also slightly improved.  3. achievable.; CT of the abdomen and pelvis was performed with the administration of intravenous contrast. Multiplanar reformatted images are provided for review. Dose modulation, iterative reconstruction, and/or weight based adjustment of the mA/kV was utilized to reduce the radiation dose to as low as reasonably achievable. COMPARISON: 10/19/2020 and 01/14/2021 CT HISTORY: ORDERING SYSTEM PROVIDED HISTORY: elevated d dimer. soa TECHNOLOGIST PROVIDED HISTORY: Reason for exam:->elevated d dimer. soa Decision Support Exception->Emergency Medical Condition (MA) Reason for Exam: sob, abd distention Acuity: Unknown Type of Exam: Unknown; ORDERING SYSTEM PROVIDED HISTORY: distended abdomen. TECHNOLOGIST PROVIDED HISTORY: Reason for exam:->distended abdomen. Additional Contrast?->None Reason for Exam: sob, abd distention FINDINGS: Chest: Pulmonary arteries: Study is of good technical quality for evaluation of pulmonary embolism. There are no filling defects to suggest pulmonary embolism. Main pulmonary artery is normal in caliber. No evidence of right ventricular strain. Mediastinum: The heart size is normal.  The aorta is normal.  Ill-defined mediastinal and hilar lymph nodes do not appear to be significantly enlarged, similar to prior CT. Thyroid and esophagus are unremarkable. Lungs/pleura: Airways are patent. Moderate peribronchial thickening in the bilateral lower lobes. Large right pleural effusion appears chronic with significant atelectasis of the right lower lobe. Perhaps slight decrease in volume of fluid and improved aeration of the superior segment since prior CT. Small left pleural effusion stable. Moderate underlying centrilobular emphysema. Scattered ground-glass airspace opacification in the bilateral upper lobes with distribution and extent different from prior CT. Overall slight improvement. No discrete mass or nodule.  Soft Tissues/Bones: Remote T11 compression fracture stable with no interval detrimental change. Abdomen/Pelvis: Organs: Cirrhotic morphology of the liver with no focal lesion detected on current exam.  There is dilation of the portal veins with near occlusive thrombus in close proximity to the portal confluence that is new from prior imaging. The spleen is not enlarged. Massive ascites throughout the peritoneum. Cholelithiasis without biliary dilatation. Pancreas is normal. The adrenal glands are normal.  Punctate nonobstructing calculi are present in both kidneys. GI/Bowel: Limited evaluation of GI tract given pattern of ascites. No obvious mucosal abnormality proximally. The appendix is not clearly identified. Questionable mucosal edema involving the cecum and right colon which is difficult to characterize. Pelvis: The bladder is distended. No mucosal thickening. The uterus and adnexa are normal.  Calcified leiomyoma demonstrated. Peritoneum/Retroperitoneum: The aorta tapers normally. No lymphadenopathy. Bones/Soft Tissues: No significant skeletal abnormalities. 1. No pulmonary embolism. 2. Chronic bilateral pleural effusions with perhaps slight decrease in volume on the right. There is significant atelectasis of the right lower lobe, also slightly improved. 3. Patchy ground-glass opacification in the upper lobes. Change and pattern would favor edema or chronic inflammatory process. 4. Cirrhosis and portal hypertension. Near occlusive thrombus in the main portal vein just proximal to the portal confluence. 5. Massive ascites. 6. Limited evaluation of GI tract given above. Questionable mucosal edema of the cecum and right colon. This may be artifact. Underlying colitis may be considered clinically.      Us Guided Paracentesis    Result Date: 1/25/2021  PROCEDURE: ULTRASOUND GUIDED PARACENTESIS 1/25/2021 HISTORY: ORDERING SYSTEM PROVIDED HISTORY: Decompensated cirrhosis TECHNOLOGIST PROVIDED HISTORY: Reason for exam:->Decompensated cirrhosis TECHNIQUE: Informed consent was obtained after a detailed explanation of the procedure including risks, benefits, and alternatives. Universal protocol was followed. The right abdomen was prepped and draped in sterile fashion and local anesthesia was achieved with lidocaine. A 5 Syrian needle sheath was advanced under ultrasound guidance into ascites and paracentesis was performed. The patient tolerated the procedure well. FINDINGS: A total of 2 L was removed. Successful ultrasound guided paracentesis. Hospital Problems           Last Modified POA    Elevated d-dimer 1/24/2021 Yes    Decompensation of cirrhosis of liver ([address] 1/24/2021 Yes    Generalized abdominal pain 1/24/2021 Yes    Portal vein thrombosis 1/25/2021 Yes        Assessment:   65 yo female with decompensated cirrhosis, COPD, new portal vein thrombus    Plan:   1. Will give iv albumin, would benefit from large volume paracentesis. Will plan again tomorrow  2. Would recommend anticoagulation for new portal vein thrombus. Will plan EGD tomorrow prior to starting.       Hadley Martin DO  8:46 PM 1/25/2021            [facility]    [address]      [address] [place] [address]     Phone: [phone]     Fax: [phone]

## 2021-01-26 NOTE — PROGRESS NOTES
Pt awake most of the night. Pt seems anxious due to abd pain also with spasms & cramping in hands & feet. MD perfect served about need for muscle relaxer with no response. Call light within reach.

## 2021-01-26 NOTE — PROGRESS NOTES
Transport here to take pt back to room. Pt in stable condition at this time. Pt denies pain or any needs.

## 2021-01-26 NOTE — PROGRESS NOTES
Shift assessment complete. See doc flow. Nightly medications given see MAR. Patient c/o abdominal 8/10 PRN oxy given. Pt states she takes 800mg of gabapentin TID not what is ordered 400mg BID. Pt also would like a muscle relaxer for the chronic cramping in hands & feet. Call light and bedside table within easy reach. Will continue to monitor.

## 2021-01-26 NOTE — PROGRESS NOTES
paracentesis. CT CHEST PULMONARY EMBOLISM W CONTRAST   Final Result   1. No pulmonary embolism. 2. Chronic bilateral pleural effusions with perhaps slight decrease in volume   on the right. There is significant atelectasis of the right lower lobe, also   slightly improved. 3. Patchy ground-glass opacification in the upper lobes. Change and pattern   would favor edema or chronic inflammatory process. 4. Cirrhosis and portal hypertension. Near occlusive thrombus in the main   portal vein just proximal to the portal confluence. 5. Massive ascites. 6. Limited evaluation of GI tract given above. Questionable mucosal edema of   the cecum and right colon. This may be artifact. Underlying colitis may be   considered clinically. CT ABDOMEN PELVIS W IV CONTRAST Additional Contrast? None   Final Result   1. No pulmonary embolism. 2. Chronic bilateral pleural effusions with perhaps slight decrease in volume   on the right. There is significant atelectasis of the right lower lobe, also   slightly improved. 3. Patchy ground-glass opacification in the upper lobes. Change and pattern   would favor edema or chronic inflammatory process. 4. Cirrhosis and portal hypertension. Near occlusive thrombus in the main   portal vein just proximal to the portal confluence. 5. Massive ascites. 6. Limited evaluation of GI tract given above. Questionable mucosal edema of   the cecum and right colon. This may be artifact. Underlying colitis may be   considered clinically. XR CHEST PORTABLE   Final Result   1. Interval increased volume of now moderate right-sided layering pleural   effusion with adjacent compressive right basilar atelectasis. 2. Diffuse mild interstitial prominence suggestive of mild interstitial edema.          US GUIDED PARACENTESIS    (Results Pending)         Assessment/Plan:  Decompensated Cirrhosis of the Liver with ascites and abd distention   Pt with hx of Chronic Hepatitis C/B, reports no recent alcohol use   - CT abdomen: cirrhosis and portal HTN, new occlusive thrombus in main portal vein just proximal to the portal confluence, massive ascites  - Admitted to Med Surg  - s/p paracentesis this morning. 2 L removed. - resume diuresis lasix and aldactone   - GI consult   EGD today   Large volume paracentesis ordered today  IV albumin      COPD AE   - dyspnea likely combination of copd flare up and worsening ascites   - CXR with interval increased volume of now moderate right sided layering pleural effusions with adjacent compressive right basilar atelectasis  - CT w/o PE  - started on steroids, HHN      Hyponatremia  - Na 127, in setting cirrhosis of the liver  - monitor, improved to 131     Lactic Acidosis  - in setting of liver disease- monitor     Portal Vein Thrombosis-unsure new or chronic, need to d.w Radiology  GI to comment on anticoagulation   Start  eliquis      DVT Prophylaxis: lovenox  Diet: Diet NPO Time Specified  Code Status: Full Code      JERSON Casas.

## 2021-01-26 NOTE — ANESTHESIA PRE PROCEDURE
Department of Anesthesiology  Preprocedure Note       Name:  Donavon Nash   Age:  64 y.o.  :  1960                                          MRN:  [id number]         Date:  2021      Surgeon: Neva Lopez):  Loretta Hernandez DO    Procedure: Procedure(s):  EGD W/ANES. NEG. COVID    Medications prior to admission:   Prior to Admission medications    Medication Sig Start Date End Date Taking?  Authorizing Provider   SYMBICORT 80-4.5 MCG/ACT AERO  20   Historical Provider, MD   spironolactone (ALDACTONE) 50 MG tablet TAKE 1 TABLET BY MOUTH TWICE A DAY 20   Historical Provider, MD   albuterol sulfate HFA (VENTOLIN HFA) 108 (90 Base) MCG/ACT inhaler Inhale 2 puffs into the lungs 4 times daily as needed for Wheezing 10/23/20   Brianna Henson MD   GABAPENTIN PO Take 800 mg by mouth 3 times daily Unsure of dosage     Historical Provider, MD   QUEtiapine Fumarate (SEROQUEL PO) Take 100 mg by mouth nightly     Historical Provider, MD       Current medications:    Current Facility-Administered Medications   Medication Dose Route Frequency Provider Last Rate Last Admin    apixaban (ELIQUIS) tablet 10 mg  10 mg Oral BID Mohamud Figueroa MD        oxyCODONE (ROXICODONE) immediate release tablet 5 mg  5 mg Oral Q6H PRN Mohamud Figueroa MD   5 mg at 21 0838    predniSONE (DELTASONE) tablet 40 mg  40 mg Oral Daily Mohamud Figueroa MD   40 mg at 21 0838    albumin human 25 % IV solution 25 g  25 g Intravenous Q8H [facility],  mL/hr at 21 0824 25 g at 21 0824    albuterol (PROVENTIL) nebulizer solution 2.5 mg  2.5 mg Nebulization Q6H PRN Page Lombardi MD   2.5 mg at 21 1133    spironolactone (ALDACTONE) tablet 50 mg  50 mg Oral BID Page Lombardi MD   50 mg at 21 0838    budesonide-formoterol (SYMBICORT) 80-4.5 MCG/ACT inhaler 1 puff  1 puff Inhalation BID Page Lombardi MD   1 puff at 21 4985  Opioid abuse ([facility] F11.10    Elevated d-dimer R79.89    Acute encephalopathy G93.40    Pneumonia J18.9    Community acquired pneumonia J18.9    Acute respiratory failure with hypoxia (HCC) J96.01    COPD exacerbation (HCC) J44.1    Hyponatremia E87.1    Streptococcal bacteremia R78.81, B95.5    Cirrhosis of liver with ascites (HCC) K74.60, R18.8    Decompensation of cirrhosis of liver (HCC) K72.90, K74.60    Generalized abdominal pain R10.84    Portal vein thrombosis I81       Past Medical History:        Diagnosis Date    Back pain     COPD (chronic obstructive pulmonary disease) (HCC)     Hepatitis B     Hepatitis C antibody positive in blood 2018    Seizures ([facility]        Past Surgical History:        Procedure Laterality Date    APPENDECTOMY      CARPAL TUNNEL RELEASE       SECTION      ROTATOR CUFF REPAIR Right     SKIN GRAFT      TUBAL LIGATION         Social History:    Social History     Tobacco Use    Smoking status: Former Smoker     Packs/day: 1.00     Years: 40.00     Pack years: 40.00     Quit date: 2020     Years since quittin.4    Smokeless tobacco: Never Used   Substance Use Topics    Alcohol use: No                                Counseling given: Not Answered      Vital Signs (Current):   Vitals:    21 0724 21 0739 21 1106 21 1320   BP: 108/62   121/60   Pulse: 98 100 100 73   Resp: 18 18 18 18   Temp: 98.2 °F (36.8 °C)   98 °F (36.7 °C)   TempSrc: Oral   Oral   SpO2: 95% 96% 96% 93%   Weight:       Height:                                                  BP Readings from Last 3 Encounters:   21 121/60   21 106/69   20 112/69       NPO Status:                                                                                 BMI:   Wt Readings from Last 3 Encounters:   21 139 lb (63 kg)   21 120 lb (54.4 kg)   20 150 lb (68 kg)     Body mass index is 22.44 kg/m².     CBC:   Lab Results Component Value Date    WBC 12.9 01/25/2021    RBC 3.63 01/25/2021    HGB 11.0 01/25/2021    HCT 33.1 01/25/2021    MCV 91.3 01/25/2021    RDW 18.9 01/25/2021     01/25/2021       CMP:   Lab Results   Component Value Date     01/25/2021    K 4.2 01/25/2021     01/25/2021    CO2 25 01/25/2021    BUN 15 01/25/2021    CREATININE <0.5 01/25/2021    GFRAA >60 01/25/2021    AGRATIO 0.5 01/25/2021    LABGLOM >60 01/25/2021    GLUCOSE 136 01/25/2021    PROT 6.6 01/25/2021    CALCIUM 8.5 01/25/2021    BILITOT 1.1 01/25/2021    ALKPHOS 147 01/25/2021    AST 85 01/25/2021    ALT 78 01/25/2021       POC Tests: No results for input(s): POCGLU, POCNA, POCK, POCCL, POCBUN, POCHEMO, POCHCT in the last 72 hours.     Coags:   Lab Results   Component Value Date    PROTIME 16.7 01/24/2021    INR 1.43 01/24/2021    APTT 33.4 01/24/2021       HCG (If Applicable):   Lab Results   Component Value Date    PREGTESTUR Negative 11/29/2016        ABGs: No results found for: PHART, PO2ART, SPS6CBX, VQD8BAK, BEART, E1NPUXSK     Type & Screen (If Applicable):  No results found for: LABABO, LABRH    Drug/Infectious Status (If Applicable):  No results found for: HIV, HEPCAB    COVID-19 Screening (If Applicable):   Lab Results   Component Value Date    COVID19 Not Detected 01/24/2021    COVID19 Not Detected 01/14/2021         Anesthesia Evaluation  Patient summary reviewed and Nursing notes reviewed no history of anesthetic complications:   Airway: Mallampati: II  TM distance: >3 FB   Neck ROM: full  Mouth opening: > = 3 FB Dental:    (+) upper dentures and edentulous      Pulmonary:   (+) pneumonia:  COPD:                             Cardiovascular:Negative CV ROS                      Neuro/Psych:   (+) seizures: well controlled, neuromuscular disease:,             GI/Hepatic/Renal:   (+) hepatitis: B and C, liver disease (Cirrhosis, Ascites):,      (-) GERD and no renal disease       Endo/Other: Negative Endo/Other ROS (-) diabetes mellitus               Abdominal:           Vascular: negative vascular ROS. Anesthesia Plan      TIVA     ASA 3       Induction: intravenous. Anesthetic plan and risks discussed with patient. Plan discussed with CRNA. All questions answered and agrees with plan.         Sudha Rodriguez MD   1/26/2021

## 2021-01-26 NOTE — PROCEDURES
Radiology Procedure Note    Patient Name: Benedict Wahl  Date of Birth 1960  MRN: [id number]    Procedure:US guided paracentesis  Pre-Procedure Diagnosis: Ascites    Post- Procedure Diagnosis: Same    Findings: The procedure was performed in the usual fashion, detailed in the full report provided separately. There were no immediate post-procedure complications. Right lateral abdominal puncture with 5 Fr catheter. Free flow of fluid. Complications: None    Estimated Blood Loss: less than 50     Specimens: Was Obtained: 1550 ml clear yellow fluid drained and sent to lab.       Electronically signed by Dick Pichardo MD on 1/26/2021 at 12:48 PM

## 2021-01-26 NOTE — OP NOTE
obtained of the angularis. No gastric varices were noted. Duodenum: Superficial ulcerations noted in the duodenal bulb and second portion.     Recommendations:  Await pathology results  Start anticoagulation for 6 month duration  BID PPI for 8 weeks  Devendra Grimm DO    [address]     Phone: [phone]     Fax: [phone]

## 2021-01-27 NOTE — PROGRESS NOTES
Patient requesting to be discharged by 10 AM, states \"I do not want home oxygen, I can get it somewhere else. \" Spoke with Case management.

## 2021-01-27 NOTE — DISCHARGE SUMMARY
Name:  Jumana Nolen  Room:  4286/0967-78  MRN:    [id number]    Discharge Summary      This discharge summary is in conjunction with a complete physical exam done on the day of discharge. Attending Physician: Dr. Myron Wolfe  Discharging Physician: Dr. Diego Trell: 1/24/2021  Discharge:   1/27/2021    HPI:  The patient is a 64 y.o. female with a PMH of COPD, Chronic Hepatitis C and B, Cirrhosis of the Liver who presented to [facility] ED with complaint of restive increasing shortness of breath. She reported coming to the ED for paracentesis on 1/14 he had 2 L removed. Reports now her abdomen is full and patient complains of bloating. She is been compliant with her diuretics. Reports she is no longer on Lasix. She follows with Dr. Sweta Christianson outpatient. She does report quitting smoking. She uses inhalers at home for her breathing. No fevers or chills. Did report black-looking sputum. Denies any history of alcohol abuse. Does not take lactulose. Patient was admitted for decompensated cirrhosis and COPD exacerbation. GI was consulted.       She does not wear oxygen at baseline    Diagnoses this Admission and Hospital Course   Decompensated Cirrhosis of the Liver with ascites and abd distention   Pt with hx of Chronic Hepatitis C/B, reports no recent alcohol use   - CT abdomen: cirrhosis and portal HTN, new occlusive thrombus in main portal vein just proximal to the portal confluence, massive ascites  - Admitted to Med Surg  - s/p paracentesis this morning. 2 L removed.   - resumed diuresis lasix and aldactone   - GI consulted   EGD - Portal hypertensive gastropathy  Large volume paracentesis - only 1.5 L removed   IV albumin      COPD AE   - dyspnea likely combination of copd flare up and worsening ascites   - CXR with interval increased volume of now moderate right sided layering pleural effusions with adjacent compressive right basilar atelectasis  - CT w/o PE  - started on steroids, of   the cecum and right colon. This may be artifact. Underlying colitis may be   considered clinically. CT ABDOMEN PELVIS W IV CONTRAST Additional Contrast? None   Final Result   1. No pulmonary embolism. 2. Chronic bilateral pleural effusions with perhaps slight decrease in volume   on the right. There is significant atelectasis of the right lower lobe, also   slightly improved. 3. Patchy ground-glass opacification in the upper lobes. Change and pattern   would favor edema or chronic inflammatory process. 4. Cirrhosis and portal hypertension. Near occlusive thrombus in the main   portal vein just proximal to the portal confluence. 5. Massive ascites. 6. Limited evaluation of GI tract given above. Questionable mucosal edema of   the cecum and right colon. This may be artifact. Underlying colitis may be   considered clinically. XR CHEST PORTABLE   Final Result   1. Interval increased volume of now moderate right-sided layering pleural   effusion with adjacent compressive right basilar atelectasis. 2. Diffuse mild interstitial prominence suggestive of mild interstitial edema. Discharge Medications     Medication List      START taking these medications    apixaban 5 MG Tabs tablet  Commonly known as: ELIQUIS  2 bid- 6 days, 1 bid thereafter     nystatin 503420 UNIT/ML suspension  Commonly known as: MYCOSTATIN  Take 5 mLs by mouth 4 times daily for 7 days     oxyCODONE 5 MG immediate release tablet  Commonly known as: ROXICODONE  Take 1 tablet by mouth every 8 hours as needed for Pain for up to 3 days.      pantoprazole 40 MG tablet  Commonly known as: PROTONIX  Take 1 tablet by mouth 2 times daily (before meals)     predniSONE 20 MG tablet  Commonly known as: DELTASONE  1 1/2 qd- 3 days,1 qd- 3 days,1/2 qd- 3 days        CHANGE how you take these medications    furosemide 40 MG tablet  Commonly known as: Lasix  Take 1 tablet by mouth daily  What changed:   · medication strength  · how much to take  · when to take this        CONTINUE taking these medications    albuterol sulfate  (90 Base) MCG/ACT inhaler  Commonly known as: Ventolin HFA  Inhale 2 puffs into the lungs 4 times daily as needed for Wheezing     GABAPENTIN PO     SEROQUEL PO     spironolactone 50 MG tablet  Commonly known as: ALDACTONE     Symbicort 80-4.5 MCG/ACT Aero  Generic drug: budesonide-formoterol           Where to Get Your Medications      These medications were sent to [facility] [address], [address] [place] [id number]    Phone: [phone]   · furosemide 40 MG tablet  · nystatin 601058 UNIT/ML suspension  · oxyCODONE 5 MG immediate release tablet  · pantoprazole 40 MG tablet  · predniSONE 20 MG tablet     You can get these medications from any pharmacy    Bring a paper prescription for each of these medications  · apixaban 5 MG Tabs tablet           Discharged in stable condition to home. Follow Up: Follow up with PCP. JERSON Casas.

## 2021-01-27 NOTE — CARE COORDINATION
DISCHARGE ORDER  Date/Time 2021 9:07 AM  Completed by: Zahra Hdz, Case Management    Patient Name: Mainor Herzog      : 1960  Admitting Diagnosis: Decompensation of cirrhosis of liver ([facility] [address] [K72.90, K65.59]      Admit order Date and Status: IP 2021  (verify MD's last order for status of admission)      Noted discharge order. If applicable PT/OT recommendation at Discharge: NA  DME recommendation by PT/OT: NA  Discharge Plan: Per bedside nurse pt is planning to leave the hospital todyy by 10 am,. DC order noted. Scripts e-faxed to [facility] pharmacy in [facility]. Eliquis called in for benefits check- per Roscoe Hogue ([facility] pharmacist) the patient is covered at 100% for Eliquis. Pt declines home O2 or any further O2 testing. Reviewed chart. Role of discharge planner explained and patient verbalized understanding. Discharge order is noted. Has Home O2 in place on admit:  No  Informed of need to bring portable home O2 tank on day of discharge for nursing to connect prior to leaving:   No  Verbalized agreement/Understanding:   No  Pt is being d/c'd to home today. Pt's O2 sats are 96% on RA. Discharge timeout done with Armando Pacheco. All discharge needs and concerns addressed.

## 2021-01-27 NOTE — PROGRESS NOTES
Progress Note    Admit Date:  1/24/2021    64 y.o. female with a PMH of COPD, Chronic Hepatitis C and B, Cirrhosis of the Liver who presented to [facility] ED with complaint of restive increasing shortness of breath. Patient was admitted for decompensated cirrhosis and COPD exacerbation. GI was consulted. Subjective:  Ms. Lenin Nicole feels better   S/p Paracentesis again . 2 L removed     Objective:   Patient Vitals for the past 4 hrs:   BP Temp Temp src Pulse Resp SpO2   01/27/21 0718 (!) 105/58 98.2 °F (36.8 °C) Oral 87 16 96 %            Intake/Output Summary (Last 24 hours) at 1/27/2021 4717  Last data filed at 1/27/2021 0432  Gross per 24 hour   Intake 240 ml   Output --   Net 240 ml       Physical Exam:  General:  Middle aged female, chroically sick appearing  Awake, alert and oriented. Appears to be not in any distress  Mucous Membranes:  Pink , anicteric  Neck: No JVD, no carotid bruit, no thyromegaly  Chest:  Diminished alva with scattered wheeze . No crackles  Cardiovascular:  RRR S1S2 heard, no murmurs or gallops  Abdomen: + distended,  non tender, no organomegaly, BS present  1 + edema extending from abd wall to thighs and lower ext   Extremities: . Distal pulses well felt  Neurological : grossly normal    Data:  CBC:   Recent Labs     01/25/21  0631 01/27/21  0539   WBC 12.9* 10.7   HGB 11.0* 10.8*   HCT 33.1* 32.0*   MCV 91.3 92.0    155     BMP:   Recent Labs     01/25/21  0631 01/27/21  0539   * 132*   K 4.2 4.3    102   CO2 25 25   BUN 15 16   CREATININE <0.5* <0.5*     LIVER PROFILE:   Recent Labs     01/25/21  0631   AST 85*   ALT 78*   BILITOT 1.1*   ALKPHOS 147*     PT/INR:   No results for input(s): PROTIME, INR in the last 72 hours. CULTURES  Blood cx x2: pending  Rapid COVID: not detected    RADIOLOGY  US GUIDED PARACENTESIS   Final Result   Successful ultrasound guided paracentesis. Study performed by Dr. Lakia Brennan.          US GUIDED PARACENTESIS   Final Result Successful ultrasound guided paracentesis. CT CHEST PULMONARY EMBOLISM W CONTRAST   Final Result   1. No pulmonary embolism. 2. Chronic bilateral pleural effusions with perhaps slight decrease in volume   on the right. There is significant atelectasis of the right lower lobe, also   slightly improved. 3. Patchy ground-glass opacification in the upper lobes. Change and pattern   would favor edema or chronic inflammatory process. 4. Cirrhosis and portal hypertension. Near occlusive thrombus in the main   portal vein just proximal to the portal confluence. 5. Massive ascites. 6. Limited evaluation of GI tract given above. Questionable mucosal edema of   the cecum and right colon. This may be artifact. Underlying colitis may be   considered clinically. CT ABDOMEN PELVIS W IV CONTRAST Additional Contrast? None   Final Result   1. No pulmonary embolism. 2. Chronic bilateral pleural effusions with perhaps slight decrease in volume   on the right. There is significant atelectasis of the right lower lobe, also   slightly improved. 3. Patchy ground-glass opacification in the upper lobes. Change and pattern   would favor edema or chronic inflammatory process. 4. Cirrhosis and portal hypertension. Near occlusive thrombus in the main   portal vein just proximal to the portal confluence. 5. Massive ascites. 6. Limited evaluation of GI tract given above. Questionable mucosal edema of   the cecum and right colon. This may be artifact. Underlying colitis may be   considered clinically. XR CHEST PORTABLE   Final Result   1. Interval increased volume of now moderate right-sided layering pleural   effusion with adjacent compressive right basilar atelectasis. 2. Diffuse mild interstitial prominence suggestive of mild interstitial edema.                Assessment/Plan:  Decompensated Cirrhosis of the Liver with ascites and abd distention   Pt with hx of Chronic Hepatitis C/B, reports no recent alcohol use   - CT abdomen: cirrhosis and portal HTN, new occlusive thrombus in main portal vein just proximal to the portal confluence, massive ascites  - Admitted to Med Surg  - s/p paracentesis this morning. 2 L removed. - resume diuresis lasix and aldactone   - GI consult   EGD - Portal hypertensive gastropathy  Large volume paracentesis - only 1.5 L removed   IV albumin      COPD AE   - dyspnea likely combination of copd flare up and worsening ascites   - CXR with interval increased volume of now moderate right sided layering pleural effusions with adjacent compressive right basilar atelectasis  - CT w/o PE  - started on steroids, HHN      Hyponatremia  - Na 127, in setting cirrhosis of the liver  - monitor, improved to 131     Lactic Acidosis  - in setting of liver disease- monitor     Portal Vein Thrombosis-unsure new or chronic, need to d.w Radiology  GI to comment on anticoagulation   Start  eliquis      DVT Prophylaxis: lovenox  Diet: DIET LOW SODIUM 2 GM;  Code Status: Full Code    D/c home     JERSON Casas.

## 2021-01-27 NOTE — PROGRESS NOTES
Patient discharged to home, expressed full understanding of discharge instructions and new prescriptions. One script sent with patient. All belongings sent. Left via wheelchair via car accompanied by friend.

## 2021-02-02 PROBLEM — K76.82 ACUTE HEPATIC ENCEPHALOPATHY: Status: ACTIVE | Noted: 2021-01-01

## 2021-02-02 NOTE — ED NOTES
Daughter, Lucina Omer was called to update on patient status at [address], [place]  02/02/21 1154

## 2021-02-02 NOTE — H&P
Hospital Medicine History & Physical      PCP: Davina Mendoza PA-C    Date of Admission: 2021    Date of Service: Pt seen/examined on 2021    Chief Complaint:    Chief Complaint   Patient presents with    Altered Mental Status     EMS states that they were called about an hour and a half after the patient was found on the floor by family members. EMS placed IV 20# left upper arm, and admoinistered 300 mL NS. Blood Glucose was 192 en route, VSS. History Of Present Illness: The patient is a 64 y.o. female with COPD, liver cirrhosis, hep B/C, prior seizures who presented to [facility] ED with complaint of altered mental status. Patient is unable to provide any history upon arrival.  History is obtained via ED providers note. Denver Baltimore is a 64 y.o. female  who presents to the ED complaining of Altered mental status. Unknown LKW. EMS called by family at the house. Reported hx of Hep B and C and drug abuse. Minimal history obtainable. Patient unable to provide any history. Recent hx of admission with paracentesis performed and diuresed\"     Past Medical History:        Diagnosis Date    Back pain     COPD (chronic obstructive pulmonary disease) (HCC)     Hepatitis B     Hepatitis C antibody positive in blood 2018    Seizures ([address]        Past Surgical History:        Procedure Laterality Date    APPENDECTOMY      CARPAL TUNNEL RELEASE       SECTION      ROTATOR CUFF REPAIR Right     SKIN GRAFT      TUBAL LIGATION      UPPER GASTROINTESTINAL ENDOSCOPY  2021    EDG BIOPSY    UPPER GASTROINTESTINAL ENDOSCOPY N/A 2021    EGD BIOPSY performed by Elizabeth Groves DO at [facility]       Medications Prior to Admission:    Prior to Admission medications    Medication Sig Start Date End Date Taking?  Authorizing Provider   apixaban (ELIQUIS) 5 MG TABS tablet 2 bid- 6 days, 1 bid thereafter 21   Ada Bobo MD   furosemide (LASIX) 40 MG tablet Take 1 tablet by mouth daily 1/27/21 2/26/21  Rodriguez Bell MD   pantoprazole (PROTONIX) 40 MG tablet Take 1 tablet by mouth 2 times daily (before meals) 1/27/21   Rodriguez Bell MD   nystatin (MYCOSTATIN) 253439 UNIT/ML suspension Take 5 mLs by mouth 4 times daily for 7 days 1/27/21 2/3/21  Rodriguez Bell MD   predniSONE (DELTASONE) 20 MG tablet 1 1/2 qd- 3 days,1 qd- 3 days,1/2 qd- 3 days 1/27/21   Rodriguez Bell MD   SYMBICORT 80-4.5 MCG/ACT AERO  8/24/20   Historical Provider, MD   spironolactone (ALDACTONE) 50 MG tablet TAKE 1 TABLET BY MOUTH TWICE A DAY 11/4/20   Historical Provider, MD   albuterol sulfate HFA (VENTOLIN HFA) 108 (90 Base) MCG/ACT inhaler Inhale 2 puffs into the lungs 4 times daily as needed for Wheezing 10/23/20   Anitra Sawyer MD   GABAPENTIN PO Take 800 mg by mouth 3 times daily Unsure of dosage     Historical Provider, MD   QUEtiapine Fumarate (SEROQUEL PO) Take 100 mg by mouth nightly     Historical Provider, MD       Allergies:  Flexeril [cyclobenzaprine], Ultram [tramadol hcl], and Robaxin [methocarbamol]    Social History:  The patient currently lives at home    TOBACCO:   reports that she quit smoking about 5 months ago. She has a 40.00 pack-year smoking history. She has never used smokeless tobacco.  ETOH:   reports no history of alcohol use. Family History:   Positive as follows:        Problem Relation Age of Onset    Diabetes Father     Heart Disease Father        REVIEW OF SYSTEMS:     Unable to assess secondary to mental status    PHYSICAL EXAM:    /68   Pulse 88   Temp 98.4 °F (36.9 °C) (Axillary)   Resp 18   Ht 5' 6\" (1.676 m)   Wt 135 lb (61.2 kg)   LMP 11/12/2012   SpO2 98%   BMI 21.79 kg/m²   Gen:, Thin, chronically ill appearing female, appears older than stated age  Eyes: PERRL. squeezes eyes tight, open on command, no conjunctival injection. ENT: No discharge. Pharynx clear.    Neck: Trachea midline. Resp: No accessory muscle use. No crackles. No wheezes. No rhonchi. Room air  CV: Regular rate. Regular rhythm. No murmur. No rub. 3+ edema. Capillary Refill: Brisk,< 3 seconds   Peripheral Pulses: +2 palpable, equal bilaterally   GI: Winces with palpation diffusely across the abdomen.  + Distended.  + Ascites, hypoactive bowel sounds  Skin: Warm and dry. Diffuse erythema and warmth to the distal left lower extremity compared to the right with symmetric edema of the bilateral lower extremities  M/S: Edema bilateral lower extremities  Neuro: Sleeping, withdraws from pain, actively resists me opening her eyes, does move all extremities spontaneously  Psych: Unable to assess    CBC:   Recent Labs     02/02/21  1423   WBC 9.3   HGB 12.0   HCT 35.6*   MCV 92.0   *     BMP:   Recent Labs     02/02/21  1337   *   K 3.9      CO2 25   BUN 18   CREATININE <0.5*     LIVER PROFILE:   Recent Labs     02/02/21  1337   AST 60*   ALT 56*   BILITOT 1.7*   ALKPHOS 129     PT/INR:   Recent Labs     02/02/21  1337   PROTIME 17.7*   INR 1.52*     UA:  Recent Labs     02/02/21  1621   COLORU Yellow   PHUR 6.5  6.5   CLARITYU Clear   SPECGRAV 1.020   LEUKOCYTESUR Negative   UROBILINOGEN 1.0   BILIRUBINUR Negative   BLOODU Negative   GLUCOSEU Negative     CULTURES  COVID-19: not detected  Blood Cx: pending     EKG:  I have reviewed the EKG with the following interpretation:   Sinus rhythm with Premature atrial complexes with Aberrant conduction  T wave abnormality, consider inferior ischemia  Abnormal ECG  No previous ECGs    RADIOLOGY  CT Head WO Contrast   Final Result   No acute intracranial abnormality. CT Cervical Spine WO Contrast   Final Result   1. No acute abnormality of the cervical spine   2. Left upper lobe ground-glass infiltrate suspicious for pneumonia. Correlate with chest x-ray         XR CHEST PORTABLE   Final Result   Pulmonary venous congestion.       Subtle increased density in the right lung base laterally may represent   atelectasis or mild pneumonia           Pertinent previous results reviewed   None     ASSESSMENT/PLAN:  Acute metabolic versus toxic encephalopathy  -Possible hepatic encephalopathy with hyperammonemia, see below  -UDS positive for amphetamines and cannabis also possibly contributing  -CT head with no acute intracranial abnormality and CT spine with no acute findings  -UA without findings of infection  -Check blood cultures, check procalcitonin    Liver cirrhosis  Abdominal ascites  Hyperammonemia  Coagulopathy   Abdominal Pain? (winces on palpation)  -Suspect some component of acute hepatic encephalopathy given elevated ammonia levels which have not been elevated in the past  -Patient is unable to take p.o. lactulose, will give lactulose enemas daily  -Recent admission with paracentesis-->repeat paracentesis and panel, Start Rocephin for possible SBO for now   -Patient with recurrent ascites, abdominal distention status post  EGD, found candidal esophagitis and portal hypertensive gastropathy and superficial ulcerations in the duodenal bulb  -Continue home diuretics--> lasix changed to IV as patient unable to take PO on admission   -GI consult  -Repeat ammonia level in AM    Hyponatremia  -Improved compared to prior  -Continue home medications as tolerated    TCP  - 2/2 known liver disease  - new compared to prior   - No acute bleeding, okay to continue home Eliquis but monitor closely     Possible LLE cellulitis   - erythema and warmth compared to RLE   - Will add Doxycycline for coverage and monitor   - Symmetric edema and patient AC on Eliquis, low concerns for DVT    COPD  -Chest x-ray with pulmonary venous congestion  -CT showed possible upper lobe infection  -No white count, checking PCT  -Patient not requiring any supplemental O2  -Breath sounds clear  -No acute exacerbation    Portal Vein Thrombosis  -Started on Eliquis during last admission  -Continue Eliquis    Substance abuse  -UDS positive for amphetamines and cannabis  -Will need counseling on cessation when mental status improved    Abnormal EKG  - artifact making it difficult to assess   - Repeat EKG--> nursing communication to notify on-call provider when EKG is complete  - Continue tele monitoring     DVT Prophylaxis: Eliquis  Diet: No diet orders on file   Code Status: Prior      Christopher Stuart  2/2/2021 6:26 PM

## 2021-02-02 NOTE — ED NOTES
Report called to [place] Byrne, transport set up to take patient to floor.      Celsa Clark RN  02/02/21 5830

## 2021-02-02 NOTE — ED PROVIDER NOTES
[address] ED      CHIEF COMPLAINT  Altered Mental Status (EMS states that they were called about an hour and a half after the patient was found on the floor by family members. EMS placed IV 20# left upper arm, and admoinistered 300 mL NS. Blood Glucose was 192 en route, VSS.)       HISTORY OF PRESENT ILLNESS  Elaine Saucedo is a 64 y.o. female  who presents to the ED complaining of Altered mental status. Unknown LKW. EMS called by family at the house. Reported hx of Hep B and C and drug abuse. Minimal history obtainable. Patient unable to provide any history. Recent hx of admission with paracentesis performed and diuresed. No other complaints, modifying factors or associated symptoms. I have reviewed the following from the nursing documentation.     Past Medical History:   Diagnosis Date    Back pain     COPD (chronic obstructive pulmonary disease) (HCC)     Hepatitis B     Hepatitis C antibody positive in blood 2018    Seizures ([address]      Past Surgical History:   Procedure Laterality Date    APPENDECTOMY      CARPAL TUNNEL RELEASE       SECTION      ROTATOR CUFF REPAIR Right     SKIN GRAFT      TUBAL LIGATION      UPPER GASTROINTESTINAL ENDOSCOPY  2021    EDG BIOPSY    UPPER GASTROINTESTINAL ENDOSCOPY N/A 2021    EGD BIOPSY performed by Jem Stinson DO at [facility]     Family History   Problem Relation Age of Onset    Diabetes Father     Heart Disease Father      Social History     Socioeconomic History    Marital status:      Spouse name: Not on file    Number of children: Not on file    Years of education: Not on file    Highest education level: Not on file   Occupational History    Not on file   Social Needs    Financial resource strain: Not on file    Food insecurity     Worry: Not on file     Inability: Not on file    Transportation needs     Medical: Not on file     Non-medical: Not on file   Tobacco Use    Smoking status: Former Smoker     Packs/day: 1.00     Years: 40.00     Pack years: 40.00     Quit date: 2020     Years since quittin.4    Smokeless tobacco: Never Used   Substance and Sexual Activity    Alcohol use: No    Drug use: Not Currently     Types: Methamphetamines, Marijuana    Sexual activity: Not on file   Lifestyle    Physical activity     Days per week: Not on file     Minutes per session: Not on file    Stress: Not on file   Relationships    Social connections     Talks on phone: Not on file     Gets together: Not on file     Attends Druze service: Not on file     Active member of club or organization: Not on file     Attends meetings of clubs or organizations: Not on file     Relationship status: Not on file    Intimate partner violence     Fear of current or ex partner: Not on file     Emotionally abused: Not on file     Physically abused: Not on file     Forced sexual activity: Not on file   Other Topics Concern    Not on file   Social History Narrative    Not on file     No current facility-administered medications for this encounter.       Current Outpatient Medications   Medication Sig Dispense Refill    apixaban (ELIQUIS) 5 MG TABS tablet 2 bid- 6 days, 1 bid thereafter 68 tablet 0    furosemide (LASIX) 40 MG tablet Take 1 tablet by mouth daily 30 tablet 0    pantoprazole (PROTONIX) 40 MG tablet Take 1 tablet by mouth 2 times daily (before meals) 60 tablet 1    nystatin (MYCOSTATIN) 616680 UNIT/ML suspension Take 5 mLs by mouth 4 times daily for 7 days 140 mL 0    predniSONE (DELTASONE) 20 MG tablet 1 1/2 qd- 3 days,1 qd- 3 days,1/2 qd- 3 days 9 tablet 0    SYMBICORT 80-4.5 MCG/ACT AERO       spironolactone (ALDACTONE) 50 MG tablet TAKE 1 TABLET BY MOUTH TWICE A DAY      albuterol sulfate HFA (VENTOLIN HFA) 108 (90 Base) MCG/ACT inhaler Inhale 2 puffs into the lungs 4 times daily as needed for Wheezing 1 Inhaler 1    GABAPENTIN PO Take 800 mg by mouth 3 times daily Unsure of dosage       QUEtiapine Fumarate (SEROQUEL PO) Take 100 mg by mouth nightly        Allergies   Allergen Reactions    Flexeril [Cyclobenzaprine]      Causes legs to be restless    Ultram [Tramadol Hcl]     Robaxin [Methocarbamol] Nausea And Vomiting       REVIEW OF SYSTEMS  Unable to obtained 2/2 patient's current mental status. PHYSICAL EXAM  /68   Pulse 88   Temp 98.4 °F (36.9 °C) (Axillary)   Resp 18   Ht 5' 6\" (1.676 m)   Wt 135 lb (61.2 kg)   LMP 11/12/2012   SpO2 98%   BMI 21.79 kg/m²    GENERAL APPEARANCE: Lying in bed with eyes closed but actively tries to keep them closed when I try to look at her pupils. Uncooperative. No acute distress. Very thin appearing. HENT: Normocephalic. Atraumatic. Mucous membranes are tacky. No drooling or stridor. NECK: Supple. EYES: PERRL. EOM's grossly intact. HEART/CHEST: RRR. No murmurs. LUNGS: Respirations unlabored. CTAB. Good air exchange. ABDOMEN: No tenderness. Soft. Distended with fluid wave. . No masses. No organomegaly. No guarding or rebound. MUSCULOSKELETAL: No extremity edema. Compartments soft. No deformity. No tenderness in the extremities. All extremities neurovascularly intact. SKIN: Warm and dry. No acute rashes. NEUROLOGICAL: Patient actively trying to keep her eyes closed when I assess her. She is not following any commands. She does move her bilateral upper extremities and try to withdraw them when I move them away from her side. PSYCHIATRIC: Unable to assess. LABS  I have reviewed all labs for this visit.    Results for orders placed or performed during the hospital encounter of 02/02/21   Comprehensive Metabolic Panel w/ Reflex to MG   Result Value Ref Range    Sodium 133 (L) 136 - 145 mmol/L    Potassium reflex Magnesium 3.9 3.5 - 5.1 mmol/L    Chloride 102 99 - 110 mmol/L    CO2 25 21 - 32 mmol/L    Anion Gap 6 3 - 16    Glucose 139 (H) 70 - 99 mg/dL    BUN 18 7 - 20 mg/dL    CREATININE <0.5 (L) 0.6 - 1.1 mg/dL    GFR Non-African American >60 >60    GFR African American >60 >60    Calcium 8.2 (L) 8.3 - 10.6 mg/dL    Total Protein 6.5 6.4 - 8.2 g/dL    Albumin 2.7 (L) 3.4 - 5.0 g/dL    Albumin/Globulin Ratio 0.7 (L) 1.1 - 2.2    Total Bilirubin 1.7 (H) 0.0 - 1.0 mg/dL    Alkaline Phosphatase 129 40 - 129 U/L    ALT 56 (H) 10 - 40 U/L    AST 60 (H) 15 - 37 U/L    Globulin 3.8 g/dL   Protime-INR   Result Value Ref Range    Protime 17.7 (H) 10.0 - 13.2 sec    INR 1.52 (H) 0.86 - 1.14   Ammonia   Result Value Ref Range    Ammonia 83 (H) 11 - 51 umol/L   CBC Auto Differential   Result Value Ref Range    WBC 9.3 4.0 - 11.0 K/uL    RBC 3.87 (L) 4.00 - 5.20 M/uL    Hemoglobin 12.0 12.0 - 16.0 g/dL    Hematocrit 35.6 (L) 36.0 - 48.0 %    MCV 92.0 80.0 - 100.0 fL    MCH 31.0 26.0 - 34.0 pg    MCHC 33.7 31.0 - 36.0 g/dL    RDW 20.8 (H) 12.4 - 15.4 %    Platelets 195 (L) 520 - 450 K/uL    MPV 7.9 5.0 - 10.5 fL    Neutrophils % 79.5 %    Lymphocytes % 10.7 %    Monocytes % 7.9 %    Eosinophils % 0.8 %    Basophils % 1.1 %    Neutrophils Absolute 7.4 1.7 - 7.7 K/uL    Lymphocytes Absolute 1.0 1.0 - 5.1 K/uL    Monocytes Absolute 0.7 0.0 - 1.3 K/uL    Eosinophils Absolute 0.1 0.0 - 0.6 K/uL    Basophils Absolute 0.1 0.0 - 0.2 K/uL   Ethanol   Result Value Ref Range    Ethanol Lvl None Detected mg/dL   EKG 12 Lead   Result Value Ref Range    Ventricular Rate 89 BPM    Atrial Rate 89 BPM    P-R Interval 120 ms    QRS Duration 62 ms    Q-T Interval 370 ms    QTc Calculation (Bazett) 450 ms    P Axis 60 degrees    R Axis 40 degrees    T Axis -36 degrees    Diagnosis       Sinus rhythm with Premature atrial complexes with Aberrant conductionT wave abnormality, consider inferior ischemiaAbnormal ECGNo previous ECGs available       ECG  The Ekg interpreted by me shows  normal sinus rhythm with a rate of 89  Axis is   Normal  QTc is  within an acceptable range  Intervals and Durations are unremarkable.       ST Segments: Support Exception->Emergency Medical Condition (MA) Reason for Exam: ams Acuity: Unknown Type of Exam: Unknown FINDINGS: BONES/ALIGNMENT: No evidence of fracture or subluxation DEGENERATIVE CHANGES: Degenerative disc disease C5-C6 and C6-C7. Facet osteoarthritis C2-C3 through C4-C5 on the left, and C4-C5 on the right SOFT TISSUES: No prevertebral soft tissue swelling. Ground-glass infiltrates in the left upper lobe     1. No acute abnormality of the cervical spine 2. Left upper lobe ground-glass infiltrate suspicious for pneumonia. Correlate with chest x-ray     Ct Abdomen Pelvis W Iv Contrast Additional Contrast? None    Result Date: 1/24/2021  EXAMINATION: CTA OF THE CHEST; CT OF THE ABDOMEN AND PELVIS WITH CONTRAST 1/24/2021 6:49 am TECHNIQUE: CTA of the chest was performed after the administration of intravenous contrast.  Multiplanar reformatted images are provided for review. MIP images are provided for review. Dose modulation, iterative reconstruction, and/or weight based adjustment of the mA/kV was utilized to reduce the radiation dose to as low as reasonably achievable.; CT of the abdomen and pelvis was performed with the administration of intravenous contrast. Multiplanar reformatted images are provided for review. Dose modulation, iterative reconstruction, and/or weight based adjustment of the mA/kV was utilized to reduce the radiation dose to as low as reasonably achievable. COMPARISON: 10/19/2020 and 01/14/2021 CT HISTORY: ORDERING SYSTEM PROVIDED HISTORY: elevated d dimer. soa TECHNOLOGIST PROVIDED HISTORY: Reason for exam:->elevated d dimer. soa Decision Support Exception->Emergency Medical Condition (MA) Reason for Exam: sob, abd distention Acuity: Unknown Type of Exam: Unknown; ORDERING SYSTEM PROVIDED HISTORY: distended abdomen. TECHNOLOGIST PROVIDED HISTORY: Reason for exam:->distended abdomen.  Additional Contrast?->None Reason for Exam: sob, abd distention FINDINGS: Chest: Pulmonary arteries: demonstrated. Peritoneum/Retroperitoneum: The aorta tapers normally. No lymphadenopathy. Bones/Soft Tissues: No significant skeletal abnormalities. 1. No pulmonary embolism. 2. Chronic bilateral pleural effusions with perhaps slight decrease in volume on the right. There is significant atelectasis of the right lower lobe, also slightly improved. 3. Patchy ground-glass opacification in the upper lobes. Change and pattern would favor edema or chronic inflammatory process. 4. Cirrhosis and portal hypertension. Near occlusive thrombus in the main portal vein just proximal to the portal confluence. 5. Massive ascites. 6. Limited evaluation of GI tract given above. Questionable mucosal edema of the cecum and right colon. This may be artifact. Underlying colitis may be considered clinically. Ct Abdomen Pelvis W Iv Contrast Additional Contrast? None    Result Date: 1/14/2021  EXAMINATION: CT OF THE ABDOMEN AND PELVIS WITH CONTRAST 1/14/2021 12:39 pm TECHNIQUE: CT of the abdomen and pelvis was performed with the administration of intravenous contrast. Multiplanar reformatted images are provided for review. Dose modulation, iterative reconstruction, and/or weight based adjustment of the mA/kV was utilized to reduce the radiation dose to as low as reasonably achievable. COMPARISON: 11/03/2020 HISTORY: ORDERING SYSTEM PROVIDED HISTORY: abdominal pain TECHNOLOGIST PROVIDED HISTORY: Reason for exam:->abdominal pain Additional Contrast?->None Reason for Exam: ABDOMINAL PAIN/ ASCITES Acuity: Chronic Type of Exam: Initial FINDINGS: Lower Chest: Chronic small right pleural effusion and compressive right basilar atelectasis is unchanged from prior. Organs: The liver is cirrhotic in appearance. Several nonobstructing left intrarenal calculi are present, none of which measure greater than 1 mm. The remainder of the solid abdominal organs are unremarkable. Tiny gallstones layer dependently within the gallbladder. GI/Bowel: There is diffuse mucosal fold thickening throughout the small bowel. There is no bowel dilatation or obstruction. Pelvis: The bladder is decompressed and thus not evaluated. The uterus is grossly unremarkable aside from a small calcified fibroid. Peritoneum/Retroperitoneum: There is large volume ascites present throughout the abdomen and pelvis. Enumerable collateral vessels/varicosities are scattered throughout the abdominal cavity. Bones/Soft Tissues: There is no acute fracture or aggressive osseous lesion. 1. Cirrhosis with large volume ascites. 2. Small chronic right pleural effusion. 3. Cholelithiasis. 4. Nonobstructing left nephrolithiasis. Xr Chest Portable    Result Date: 2/2/2021  EXAMINATION: ONE XRAY VIEW OF THE CHEST 2/2/2021 1:52 pm COMPARISON: 01/24/2021 HISTORY: ORDERING SYSTEM PROVIDED HISTORY: ams TECHNOLOGIST PROVIDED HISTORY: Reason for exam:->ams Reason for Exam: ams Acuity: Acute Type of Exam: Initial FINDINGS: Expiration results in vascular crowding. The heart is within normal limits. There is ill definition of the pulmonary vascularity. There is subtle increased density in the right lung base laterally. Pulmonary venous congestion. Subtle increased density in the right lung base laterally may represent atelectasis or mild pneumonia     Xr Chest Portable    Result Date: 1/24/2021  EXAMINATION: ONE XRAY VIEW OF THE CHEST 1/24/2021 2:57 am COMPARISON: Chest portable January 14, 2021. HISTORY: ORDERING SYSTEM PROVIDED HISTORY: other TECHNOLOGIST PROVIDED HISTORY: Reason for exam:->other Reason for Exam: SOB Acuity: Acute Type of Exam: Initial Additional signs and symptoms: SOB pt denies chest pain FINDINGS: The heart is normal in size and configuration. The mediastinal contours are within normal limits. Interval increased volume of now moderate right-sided layering pleural effusion is seen with adjacent compressive atelectasis present.   Diffuse interstitial prominence is noted within the lungs. . Right 7th rib healed fracture is again evident. Bones and soft tissues are otherwise unremarkable. 1. Interval increased volume of now moderate right-sided layering pleural effusion with adjacent compressive right basilar atelectasis. 2. Diffuse mild interstitial prominence suggestive of mild interstitial edema. Xr Chest Portable    Result Date: 1/14/2021  EXAMINATION: ONE XRAY VIEW OF THE CHEST 1/14/2021 11:38 am COMPARISON: 10/21/2020 HISTORY: ORDERING SYSTEM PROVIDED HISTORY: shortness of breath TECHNOLOGIST PROVIDED HISTORY: Reason for exam:->shortness of breath Reason for Exam: SOB Acuity: Acute Type of Exam: Initial FINDINGS: There is a small right pleural effusion with bibasilar atelectasis. The cardiac silhouette is within normal limits. There is no pneumothorax. There is an old healed right rib fracture. 1. Small right pleural effusion. Ct Chest Pulmonary Embolism W Contrast    Result Date: 1/24/2021  EXAMINATION: CTA OF THE CHEST; CT OF THE ABDOMEN AND PELVIS WITH CONTRAST 1/24/2021 6:49 am TECHNIQUE: CTA of the chest was performed after the administration of intravenous contrast.  Multiplanar reformatted images are provided for review. MIP images are provided for review. Dose modulation, iterative reconstruction, and/or weight based adjustment of the mA/kV was utilized to reduce the radiation dose to as low as reasonably achievable.; CT of the abdomen and pelvis was performed with the administration of intravenous contrast. Multiplanar reformatted images are provided for review. Dose modulation, iterative reconstruction, and/or weight based adjustment of the mA/kV was utilized to reduce the radiation dose to as low as reasonably achievable. COMPARISON: 10/19/2020 and 01/14/2021 CT HISTORY: ORDERING SYSTEM PROVIDED HISTORY: elevated d dimer. soa TECHNOLOGIST PROVIDED HISTORY: Reason for exam:->elevated d dimer.  soa Decision Support Exception->Emergency Medical Condition (MA) Reason for Exam: sob, abd distention Acuity: Unknown Type of Exam: Unknown; ORDERING SYSTEM PROVIDED HISTORY: distended abdomen. TECHNOLOGIST PROVIDED HISTORY: Reason for exam:->distended abdomen. Additional Contrast?->None Reason for Exam: sob, abd distention FINDINGS: Chest: Pulmonary arteries: Study is of good technical quality for evaluation of pulmonary embolism. There are no filling defects to suggest pulmonary embolism. Main pulmonary artery is normal in caliber. No evidence of right ventricular strain. Mediastinum: The heart size is normal.  The aorta is normal.  Ill-defined mediastinal and hilar lymph nodes do not appear to be significantly enlarged, similar to prior CT. Thyroid and esophagus are unremarkable. Lungs/pleura: Airways are patent. Moderate peribronchial thickening in the bilateral lower lobes. Large right pleural effusion appears chronic with significant atelectasis of the right lower lobe. Perhaps slight decrease in volume of fluid and improved aeration of the superior segment since prior CT. Small left pleural effusion stable. Moderate underlying centrilobular emphysema. Scattered ground-glass airspace opacification in the bilateral upper lobes with distribution and extent different from prior CT. Overall slight improvement. No discrete mass or nodule. Soft Tissues/Bones: Remote T11 compression fracture stable with no interval detrimental change. Abdomen/Pelvis: Organs: Cirrhotic morphology of the liver with no focal lesion detected on current exam.  There is dilation of the portal veins with near occlusive thrombus in close proximity to the portal confluence that is new from prior imaging. The spleen is not enlarged. Massive ascites throughout the peritoneum. Cholelithiasis without biliary dilatation. Pancreas is normal. The adrenal glands are normal.  Punctate nonobstructing calculi are present in both kidneys.  GI/Bowel: Limited evaluation of GI tract given pattern of ascites. No obvious mucosal abnormality proximally. The appendix is not clearly identified. Questionable mucosal edema involving the cecum and right colon which is difficult to characterize. Pelvis: The bladder is distended. No mucosal thickening. The uterus and adnexa are normal.  Calcified leiomyoma demonstrated. Peritoneum/Retroperitoneum: The aorta tapers normally. No lymphadenopathy. Bones/Soft Tissues: No significant skeletal abnormalities. 1. No pulmonary embolism. 2. Chronic bilateral pleural effusions with perhaps slight decrease in volume on the right. There is significant atelectasis of the right lower lobe, also slightly improved. 3. Patchy ground-glass opacification in the upper lobes. Change and pattern would favor edema or chronic inflammatory process. 4. Cirrhosis and portal hypertension. Near occlusive thrombus in the main portal vein just proximal to the portal confluence. 5. Massive ascites. 6. Limited evaluation of GI tract given above. Questionable mucosal edema of the cecum and right colon. This may be artifact. Underlying colitis may be considered clinically. Us Guided Paracentesis    Result Date: 1/26/2021  PROCEDURE: ULTRASOUND GUIDED PARACENTESIS 1/26/2021 HISTORY: ORDERING SYSTEM PROVIDED HISTORY: large volume paracentesis please TECHNOLOGIST PROVIDED HISTORY: Reason for exam:->large volume paracentesis please TECHNIQUE: Informed consent was obtained after a detailed explanation of the procedure including risks, benefits, and alternatives. Preliminary scanning of the abdomen shows a large amount of ascites to be present. Permanent sonographic images were recorded. Jackson protocol was followed. The right abdomen was prepped and draped in sterile fashion and local anesthesia was achieved with lidocaine. A 5 Bulgarian multi side-hole catheter was inserted into the abdomen using trocar technique.   The catheter was attached to vacuum bottles and the paracentesis was performed. Free flow of clear yellow fluid observed. The patient tolerated the procedure well. Fuse returned to her room in good condition. FINDINGS: A total of 1550 mL of clear yellow fluid was removed. Fluid sent to the lab for the ordered studies. Successful ultrasound guided paracentesis. Study performed by Dr. Cecelia Sevilla. Us Guided Paracentesis    Result Date: 1/25/2021  PROCEDURE: ULTRASOUND GUIDED PARACENTESIS 1/25/2021 HISTORY: ORDERING SYSTEM PROVIDED HISTORY: Decompensated cirrhosis TECHNOLOGIST PROVIDED HISTORY: Reason for exam:->Decompensated cirrhosis TECHNIQUE: Informed consent was obtained after a detailed explanation of the procedure including risks, benefits, and alternatives. Universal protocol was followed. The right abdomen was prepped and draped in sterile fashion and local anesthesia was achieved with lidocaine. A 5 Tamazight needle sheath was advanced under ultrasound guidance into ascites and paracentesis was performed. The patient tolerated the procedure well. FINDINGS: A total of 2 L was removed. Successful ultrasound guided paracentesis. Us Guided Paracentesis    Result Date: 1/14/2021  PROCEDURE: ULTRASOUND GUIDED PARACENTESIS 1/14/2021 HISTORY: ORDERING SYSTEM PROVIDED HISTORY: Large Acites TECHNOLOGIST PROVIDED HISTORY: Reason for exam:->Large Acites TECHNIQUE: Informed consent was obtained after a detailed explanation of the procedure including risks, benefits, and alternatives. Universal protocol was followed. The right abdomen was prepped and draped in sterile fashion and local anesthesia was achieved with lidocaine. A 5 Tamazight needle sheath was advanced under ultrasound guidance into ascites and paracentesis was performed. The patient tolerated the procedure well. FINDINGS: A total of 2000 mL was removed. Successful ultrasound guided paracentesis. ED COURSE/MDM  Patient seen and evaluated. Old records reviewed.  Labs and imaging reviewed and results discussed with patient. Patient presenting for evaluation of altered mental status. Unclear if possibly she ingested drugs given her history versus secondary to hyperammonemia as her ammonia is higher than her typical baseline. CT head and C-spine obtained and showed no acute traumatic injury. Labs unremarkable and her sodium is actually improved today at 133 and had been trending upward after her most recent admission. No hypoglycemia. Renal function preserved. No evidence of ongoing infection. Chest x-ray and CT scan knee show possible atelectasis versus pneumonia but I feel that this favors atelectasis given lack of leukocytosis fever or other sign of infection. No localizing symptoms to suggest stroke as she is moving all extremities when I try to examine her she clenches her eyes closed and tries to move away from me purposefully. I do feel that inpatient admission is appropriate. Rapid Covid pending. I spoke with Kavya Alvarez with the hospitalist team. We thoroughly discussed the history, physical exam, laboratory and imaging studies, as well as, emergency department course. Based upon that discussion, we've decided to admit Jah Sutton for further observation and evaluation of Rayshawn Kincaid's mental status change. As I have deemed necessary from their history, physical and studies, I have considered and evaluated Jah Sutton for the following diagnoses:  DIABETES, INTRACRANIAL HEMORRHAGE, MENINGITIS, SEPSIS SUBARACHNOID HEMORRHAGE, SUBDURAL HEMATOMA, & STROKE. During the patient's ED course, the patient was given:  Medications - No data to display     CLINICAL IMPRESSION  1. Altered mental status, unspecified altered mental status type    2. Hyperammonemia (HCC)        Blood pressure 122/68, pulse 88, temperature 98.4 °F (36.9 °C), temperature source Axillary, resp.  rate 18, height 5' 6\" (1.676 m), weight 135 lb (61.2 kg), last menstrual period 11/12/2012, SpO2 98 %.    [id number] [facility] was admitted in stable condition. DISCLAIMER: This chart was created using Dragon dictation software. Efforts were made by me to ensure accuracy, however some errors may be present due to limitations of this technology and occasionally words are not transcribed correctly.         Deann Cordero MD  02/02/21 [id number]

## 2021-02-02 NOTE — TELEPHONE ENCOUNTER
Writer contacted ED provider Hernandez Huff to inform of 30 day readmission risk. ED provider informed writer of readmission.

## 2021-02-03 NOTE — PROGRESS NOTES
Pt became agitated and confused, removing tubes and equipment. Pt unable to follow directions and continues to remove equipment. Pt placed in bilateral wrist restraints and one time order for ativan received.

## 2021-02-03 NOTE — PROGRESS NOTES
This RN attempted to reach patient's mother for consent for paracentesis later today. Unable to reach her. Will try again.

## 2021-02-03 NOTE — PROGRESS NOTES
Shift assessment completed. Pt unable to follow commands. Opens eyes spontaneously. Non-purposeful movement on bed. Lactulose enema given. + BM. In bed, bed alarm on for safety, with call light in reach.

## 2021-02-03 NOTE — PROGRESS NOTES
Patient's mother, Mechelle Postin called, updated her that patient is in stable condition and that she is still having AMS. Patient's mother Homero was updated because she was listed as an emergency contact.

## 2021-02-03 NOTE — OP NOTE
Colonoscopy Note    Patient:   Elías Hernandez    YOB: 1960    Facility:     [address] [address]   [Inpatient]  Referring/PCP: Maryanne Castillo  Procedure:   Colonoscopy diagnostic  Date:     2/3/2021  Endoscopist:  Leon Garcia DO    Preoperative Diagnosis:  Rectal bleeding. Postoperative Diagnosis:  normal    Anesthesia: MAC    Estimated blood loss: < 5 ml    Complications:  None    Description of Procedure:  Informed consent was obtained from the patient after explanation of the procedure including indications, description of the procedure,  benefits and possible risks and complications of the procedure, and alternatives. Questions were answered. The patient's history was reviewed and a directed physical examination was performed prior to the procedure. Patient was monitored throughout the procedure with pulse oximetry and periodic assessment of vital signs. Patient was sedated as noted above. The Nursing staff and I performed a time out. With the patient initially in the left lateral decubitus position, a digital rectal examination was performed and revealed negative without mass, lesions or tenderness. The Olympus video colonoscope was placed in the patient's rectum and advanced without difficulty  to the cecum, which was identified by the ileocecal valve and appendiceal orifice. Photographs were taken. The prep was good. Examination of the mucosa was performed during both introduction and withdrawal of the colonoscope. Retroflexed view of the rectum was performed. Withdrawal time was over 6 minutes. Findings:     1. Normal mucosa throughout the whole colon without evidence of inflammation, polyps, or lesions  2. Medium sized hemorrhoids     Recommendations:    1. Recommend continue eliquis and aspirin  2.   Repeat colonoscopy in 5 years given family history    Leon Garcia, [address] [facility] [address]     Phone: [phone]     Fax: [phone]

## 2021-02-03 NOTE — PROGRESS NOTES
Patient arrived to unit combative when moved from left side, all equipment placed on patient with no injury. Vitals stable at this time. Patient able to open eyes to voice. Will continue to monitor progress. Albumin being held until after paracentesis is performed.  Josafat Jackson RN

## 2021-02-03 NOTE — PROGRESS NOTES
4 Eyes Skin Assessment     The patient is being assess for   Admission    I agree that 2 RN's have performed a thorough Head to Toe Skin Assessment on the patient. ALL assessment sites listed below have been assessed. Areas assessed by both nurses:   [x]   Head, Face, and Ears   [x]   Shoulders, Back, and Chest, Abdomen  [x]   Arms, Elbows, and Hands   [x]   Coccyx, Sacrum, and Ischium  [x]   Legs, Feet, and Heels        Scattered bruising and open abrasions noted. **SHARE this note so that the co-signing nurse is able to place an eSignature**    Co-signer eSignature: Electronically signed by Alma Rudolph RN on 2/3/21 at 7:20 AM EST    Does the Patient have Skin Breakdown?   No          Godfrey Prevention initiated:  No   Wound Care Orders initiated:  No      [facility] nurse consulted for Pressure Injury (Stage 3,4, Unstageable, DTI, NWPT, Complex wounds)and New or Established Ostomies:  No      Primary Nurse eSignature: Electronically signed by Priya Gong RN on 2/2/21 at 11:14 PM EST

## 2021-02-03 NOTE — CONSULTS
Patient is being seen at the request of Dr. Rox Pena for a consultation for encephalopathy    HISTORY OF PRESENT ILLNESS: 17-year-old female with end-stage liver disease/cirrhosis, chronic hepatitis C and B, COPD, seizure disorder and polysubstance abuse who was recently admitted 2021-2021 with liver failure and ascites. During that admission she was found to have portal vein thrombosis. She presented to the emergency department today by EMS after she was found down by her family. She had worsening agitation and confusion overnight and did not have a bowel movement in response to lactulose enemas today. She was transferred to the ICU this morning. She is unable to provide any additional history. PAST MEDICAL HISTORY:  Past Medical History:   Diagnosis Date    Back pain     COPD (chronic obstructive pulmonary disease) (HCC)     Hepatitis B     Hepatitis C antibody positive in blood 2018    Seizures ([address]      PAST SURGICAL HISTORY:  Past Surgical History:   Procedure Laterality Date    APPENDECTOMY      CARPAL TUNNEL RELEASE       SECTION      ROTATOR CUFF REPAIR Right     SKIN GRAFT      TUBAL LIGATION      UPPER GASTROINTESTINAL ENDOSCOPY  2021    EDG BIOPSY    UPPER GASTROINTESTINAL ENDOSCOPY N/A 2021    EGD BIOPSY performed by Kajal Santamaria DO at [address]:  family history includes Diabetes in her father; Heart Disease in her father. SOCIAL HISTORY:   reports that she quit smoking about 5 months ago. She has a 40.00 pack-year smoking history.  She has never used smokeless tobacco.    Scheduled Meds:   albumin human  75 g Intravenous Once    methylPREDNISolone  40 mg Intravenous Q12H    ipratropium-albuterol  1 ampule Inhalation Q4H WA    [Held by provider] apixaban  5 mg Oral BID    pantoprazole  40 mg Intravenous Daily    spironolactone  50 mg Oral BID    sodium chloride flush  10 mL Intravenous 2 times per day  lactulose enema   Rectal Daily    furosemide  20 mg Intravenous Daily    cefTRIAXone (ROCEPHIN) IV  1,000 mg Intravenous Q24H    doxycycline (VIBRAMYCIN) IV  100 mg Intravenous Q12H     Continuous Infusions:    PRN Meds:  sodium chloride flush, promethazine **OR** ondansetron, acetaminophen **OR** acetaminophen, potassium chloride **OR** potassium alternative oral replacement **OR** potassium chloride, magnesium sulfate    ALLERGIES:  Patient is allergic to flexeril [cyclobenzaprine]; ultram [tramadol hcl]; and robaxin [methocarbamol]. REVIEW OF SYSTEMS:  Unable to obtain because the patient is non-communicative     PHYSICAL EXAM:  Blood pressure 116/65, pulse 90, temperature 97.9 °F (36.6 °C), temperature source Axillary, resp. rate 18, height 5' 6\" (1.676 m), weight 129 lb 1.6 oz (58.6 kg), last menstrual period 11/12/2012, SpO2 96 %.' on 2 L  General: ill appearing. Eyes: PERRL. No sclera icterus. No conjunctival injection. ENT: No discharge. Pharynx clear. Neck: Trachea midline. Normal thyroid. Resp: No accessory muscle use. No crackles. No wheezing. No rhonchi. No dullness on percussion. CV: Regular rate. Regular rhythm. No mumur or rub. ++ edema. Peripheral pulses are 2+. Capillary refill is less than 3 seconds. GI: Non-tender. Somewhat distended. No masses. No organomegaly. Normal bowel sounds. No hernia. Skin: Warm and dry. No nodule on exposed extremities. No rash on exposed extremities. Lymph: No cervical LAD. No supraclavicular LAD. M/S: No cyanosis. No joint deformity. No clubbing. Neuro: Resist eye opening, moves upper extremities purposefully, no speech, not following commands. Patellar reflexes are symmetric.   Psych: Unable to obtain because the patient is non-communicative     LABS:  CBC:   Recent Labs     02/02/21  1423 02/03/21  0538   WBC 9.3 9.4   HGB 12.0 12.4   HCT 35.6* 37.0   MCV 92.0 92.7   * 125*     BMP:   Recent Labs     02/02/21  1337 02/03/21  0445   NA 133* 131*   K 3.9 3.7    101   CO2 25 21   BUN 18 20   CREATININE <0.5* <0.5*     LIVER PROFILE:   Recent Labs     02/02/21  1337 02/03/21  0445   AST 60* 86*   ALT 56* 69*   BILITOT 1.7* 1.6*   ALKPHOS 129 116     PT/INR:   Recent Labs     02/02/21  1337   PROTIME 17.7*   INR 1.52*     APTT: No results for input(s): APTT in the last 72 hours. UA:  Recent Labs     02/02/21  1621   COLORU Yellow   PHUR 6.5  6.5   CLARITYU Clear   SPECGRAV 1.020   LEUKOCYTESUR Negative   UROBILINOGEN 1.0   BILIRUBINUR Negative   BLOODU Negative   GLUCOSEU Negative     Recent Labs     02/03/21  0855   PHART 7.499*   QLK0IUH 35.2   PO2ART 103.8       Cultures:      2/2/2021 SARS-CoV-2 negative    Chest imaging was reviewed by me and showed:   2/3/2021 CT cervical spine shows groundglass infiltrate left upper lobe which is changed from the 1/24/2021 CTPA when groundglass infiltrates were also present but in a different distribution    ASSESSMENT:  · End-stage liver disease with cirrhosis   · Hepatic encephalopathy   · Recent diagnosis Candida esophagitis  · Portal hypertensive gastropathy  · Portal vein thrombosis  · Duodenal ulcers  · COPD  · Hyponatremia  · Waxing and waning groundglass infiltrates of the lung  · H/O polysubstance abuse, current tox screen with amphetamines and cannabis    PLAN:  · Lactulose, enemas have not been effective so we will place NG and give oral lactulose.  IV versed may be given to facilitate placement  · Apixaban 5 mg twice daily for portal vein thrombosis  · GI was consulted, plan is for paracentesis  · Ceftriaxone and doxycycline day #2  · D/C IV steroids   · Bactroban

## 2021-02-03 NOTE — CARE COORDINATION
Case Management Assessment  Initial Evaluation      Patient Name: Cruz Cee  YOB: 1960  Diagnosis: Acute hepatic encephalopathy [K72.00]  Date / Time: 2/2/2021  1:17 PM    Admission status/Date:2/2/2021 inpt  Chart Reviewed: Yes      Patient Interviewed: No   Family Interviewed:  Yes - mother       Hospitalization in the last 30 days:  No      Health Care Decision Maker :     (CM - must 1st enter selection under Navigator - emergency contact- Health Care Decision Maker Relationship and pick relationship)   Who do you trust or have selected to make healthcare decisions for you      Met with: mother  Gisella Alvarez conducted  (bedside/phone):telephone    Current PCP:   Arianne Duke required for SNF : Y          3 night stay required - [employer]  Support Systems/Care Needs:    Transportation: friend    Meal Preparation: self    Housing  Living Arrangements: lives in mobile home with friend Peggy Roberts  Steps:   Intent for return to present living arrangements: tbd  Identified Issues:     [address] [place] [address] with [address] : No Agency:(Services)     Passport/Waiver : No  :                      Phone Number:    Passport/Waiver Services:           [address] [place] [address] Provider: none  Equipment: none  Walker___Cane___RTS___ BSC___Shower Chair___Hospital Bed___W/C____Other________  02 at ____Liter(s)---wears(frequency)_______ HHN ___ CPAP___ BiPap___   N/A____      Home O2 Use :  No    If No for home O2---if presently on O2 during hospitalization:  Yes  if yes CM to follow for potential DC O2 need    Community Service Affiliation  Dialysis:  No    · Agency:  · Location:  · Dialysis Schedule:  · Phone:   · Fax: Other Community Services: (ex:PT/OT,Mental Health,Wound Clinic, Cardio/Pul [address])    DISCHARGE PLAN: Explained Case Management role/services. Chart reviewed. Pt currently in ICU with AMS.   TC to mother Hallie Paz [phone] and she states pt lives in mobile home with friend Roslyn Moreno. Mother states pt does drugs and has nothing to do with her right now. Mother states pt cannot return home with her nor will she pick her up at discharge. Mother states pt has 3 daughters Josi Kingsley and Dominic Terri, she has an old number for Terrence Faria of [phone]. Will follow and develop dcp as pt progresses.

## 2021-02-03 NOTE — FLOWSHEET NOTE
02/02/21 1859   Vital Signs   Temp 98.4 °F (36.9 °C)   Temp Source Axillary   Pulse 86   Heart Rate Source Monitor   /76   BP Location Right upper arm   Patient Position Left side   Oxygen Therapy   SpO2 97 %   O2 Device None (Room air)   Patient admitted to . Patient is AMS but VSS. Orders have been released.

## 2021-02-03 NOTE — PROGRESS NOTES
Bedside report given to [facility], BRAULIO DAVENPORT. Care transferred.

## 2021-02-03 NOTE — FLOWSHEET NOTE
02/03/21 0815   Vital Signs   Temp 97.9 °F (36.6 °C)   Temp Source Axillary   Pulse 90   Heart Rate Source Monitor   Resp 18   /65   BP Location Right upper arm   Patient Position Semi fowlers   Level of Consciousness Responds to Voice (1)   MEWS Score 1   Patient Currently in Pain Denies   Oxygen Therapy   SpO2 96 %   Pulse Oximeter Device Mode Intermittent   Pulse Oximeter Device Location Finger   O2 Device Nasal cannula   O2 Flow Rate (L/min) 2 L/min   Patient is resting showing no s/s of distress. Unable to assess patient's mentation, Patient does open her eyes when her name is said. . PO meds were held due to AMS, other meds were given, see MAR. Patient is denying any needs. Bed is in lowest position and call light is within reach. Will continue to monitor. Shift assessment complete, see flowsheets.

## 2021-02-03 NOTE — PROGRESS NOTES
Progress Note    Admit Date:  2/2/2021    Subjective:  Ms. Stacie Olivera is lethargic and does not converse. Will withdraw from painful stimuli. Objective:   Patient Vitals for the past 4 hrs:   BP Temp Temp src Pulse Resp SpO2   02/03/21 0815 116/65 97.9 °F (36.6 °C) Axillary 90 18 96 %   02/03/21 0700    92 16    02/03/21 0615      97 %   02/03/21 0612 (!) 164/83 98.1 °F (36.7 °C) Axillary 110 16 (!) 84 %            Intake/Output Summary (Last 24 hours) at 2/3/2021 0853  Last data filed at 2/3/2021 7588  Gross per 24 hour   Intake 0 ml   Output    Net 0 ml       Physical Exam:  Gen:, Thin, chronically ill appearing female, appears older than stated age, lethargic  Eyes: PERRL. no conjunctival injection. ENT: No discharge. Pharynx clear. Neck: Trachea midline. Resp: No accessory muscle use. No crackles. scattered wheezes. No rhonchi. On supp O2  CV: Regular rate. Regular rhythm. No murmur. No rub. 3+ edema extending up into bilateral thighs   Peripheral Pulses: +2 palpable, equal bilaterally   GI: Winces with palpation diffusely across the abdomen.  + Distended.  + Ascites, hypoactive bowel sounds  Skin: Warm and dry. M/S: Edema bilateral lower extremities  Neuro: Sleeping, withdraws from pain, actively resists me opening her eyes, does move all extremities spontaneously  Psych: Unable to assess      I Yudith Mendez have reviewed the chart on [facility] and personally interviewed and examined patient, reviewed the data (labs and imaging) and after discussion with my PA formulated the plan. Agree with note with the following edits. HPI:     I reviewed the patient's Past Medical History, Past Surgical History, Medications, and Allergies. Admitted for encephalopathy , positive drug screen  resp distress this am with severe wheezing      General:  eldelry appearing middle aged female, in resp distress.  Very drowsy and unable to be aroused  Opens eyes with deep pain stimulus  Mucous Membranes:  Pink , icteric  Neck: No JVD, no carotid bruit, no thyromegaly  Chest:  + accessory muscle use and diminished air entry with scattered wheeze  Cardiovascular:  RRR S1S2 heard, no murmurs or gallops  Abdomen: hard ++ distended + taut with ascites   , non tender, no organomegaly, BS present  Extremities: 2+ edema from abd wall to feet  Mild erythema with no cellulitis on both shins  Neurological :  encephalopathic              Scheduled Meds:   albumin human  75 g Intravenous Once    methylPREDNISolone  40 mg Intravenous Q12H    ipratropium-albuterol  1 ampule Inhalation Q4H WA    apixaban  5 mg Oral BID    pantoprazole  40 mg Intravenous Daily    spironolactone  50 mg Oral BID    sodium chloride flush  10 mL Intravenous 2 times per day    lactulose enema   Rectal Daily    furosemide  20 mg Intravenous Daily    cefTRIAXone (ROCEPHIN) IV  1,000 mg Intravenous Q24H    doxycycline (VIBRAMYCIN) IV  100 mg Intravenous Q12H     PRN Meds:  sodium chloride flush, promethazine **OR** ondansetron, acetaminophen **OR** acetaminophen, potassium chloride **OR** potassium alternative oral replacement **OR** potassium chloride, magnesium sulfate      Data:  CBC:   Recent Labs     02/02/21  1423 02/03/21  0538   WBC 9.3 9.4   HGB 12.0 12.4   HCT 35.6* 37.0   MCV 92.0 92.7   * 125*     BMP:   Recent Labs     02/02/21  1337 02/03/21  0445   * 131*   K 3.9 3.7    101   CO2 25 21   BUN 18 20   CREATININE <0.5* <0.5*     LIVER PROFILE:   Recent Labs     02/02/21  1337 02/03/21  0445   AST 60* 86*   ALT 56* 69*   BILITOT 1.7* 1.6*   ALKPHOS 129 116     PT/INR:   Recent Labs     02/02/21  1337   PROTIME 17.7*   INR 1.52*       CULTURES    Blood cx x2: pending     RADIOLOGY    CT Head WO Contrast    Final Result   No acute intracranial abnormality. CT Cervical Spine WO Contrast    Final Result   1. No acute abnormality of the cervical spine   2.  Left upper lobe ground-glass infiltrate suspicious for pneumonia. Correlate with chest x-ray         XR CHEST PORTABLE   Final Result   Pulmonary venous congestion.       Subtle increased density in the right lung base laterally may represent   atelectasis or mild pneumonia         US GUIDED PARACENTESIS    (Results Pending)     Assessment/Plan:    Acute metabolic & toxic encephalopathy  - with concern for hypercapnia, poss hepatic encephalopathy w/ hyperammonemia, see below  - UDS + for amphetamines & cannabis also contributing  - CT head: no acute intracranial abnormality & CT spine with no acute findings  - UA without findings of infection  - Check blood cultures, check PCT - 0.32  - check ABG    COPD AE  - CXR w/ pulmonary venous congestion  - CT showed possible upper lobe infection  - No white count, PCT: 0.32  - on supp O2  - check ABG  - start Solumedrol, HHN, on Doxy D#2     Liver cirrhosis  Abdominal ascites  Hyperammonemia  Coagulopathy   Abdominal Pain? (winces on palpation)  - Pt w/ recurrent ascites, abdominal distention s/p EGD, found candidal esophagitis & portal hypertensive gastropathy & superficial ulcerations in duodenal bulb  - Suspect some component of acute hepatic encephalopathy given elevated ammonia levels which have not been elevated in the past  - unable to take p.o., give lactulose enemas daily  - Recent admission with paracentesis-->repeat paracentesis & panel, on Rocephin D#2 for poss SBP   - Cont home diuretics--> lasix changed to IV as pt unable to take PO  - GI consulted  -Repeat ammonia level - 83 > 94     Hyponatremia  - Improved compared to prior  - Continue home medications as tolerated  - 133 > 131     TCP  - 2/2 known liver disease  - new compared to prior   - No acute bleeding, okay to continue home Eliquis but monitor closely   - Plt 125 today     LLE cellulitis - Ruled Out  - Symmetric edema and patient AC on Eliquis, low concerns for DVT     Portal Vein Thrombosis  - Started on Eliquis during last admission  -

## 2021-02-03 NOTE — PROGRESS NOTES
RESPIRATORY THERAPY ASSESSMENT    Name:  Jorge Landers Record Number:  [id number]  Age: 64 y.o. Gender: female  : 1960  Today's Date:  2/3/2021  Room:  [address] [place] [address]-    Assessment     Is the patient being admitted for a COPD or Asthma exacerbation? No   (If yes the patient will be seen every 4 hours for the first 24 hours and then reassessed)    Patient Admission Diagnosis      Allergies  Allergies   Allergen Reactions    Flexeril [Cyclobenzaprine]      Causes legs to be restless    Ultram [Tramadol Hcl]     Robaxin [Methocarbamol] Nausea And Vomiting       Minimum Predicted Vital Capacity:               Actual Vital Capacity:                    Pulmonary History:COPD  Home Oxygen Therapy:  room air  Home Respiratory Therapy:Albuterol/Ipratropium Bromide HHN   Current Respiratory Therapy:  duoneb Q4WA  Treatment Type: HHN  Medications: Albuterol/Ipratropium    Respiratory Severity Index(RSI)   Patients with orders for inhalation medications, oxygen, or any therapeutic treatment modality will be placed on Respiratory Protocol. They will be assessed with the first treatment and at least every 72 hours thereafter. The following severity scale will be used to determine frequency of treatment intervention.     Smoking History: Pulmonary Disease or Smoking History, Greater than 15 pack year = 2    Social History  Social History     Tobacco Use    Smoking status: Former Smoker     Packs/day: 1.00     Years: 40.00     Pack years: 40.00     Quit date: 2020     Years since quittin.4    Smokeless tobacco: Never Used   Substance Use Topics    Alcohol use: No    Drug use: Not Currently     Types: Methamphetamines, Marijuana       Recent Surgical History: Lower Abdominal = 2  Past Surgical History  Past Surgical History:   Procedure Laterality Date    APPENDECTOMY      CARPAL TUNNEL RELEASE       SECTION      ROTATOR CUFF REPAIR Right     SKIN GRAFT      TUBAL LIGATION      UPPER GASTROINTESTINAL ENDOSCOPY  01/26/2021    EDG BIOPSY    UPPER GASTROINTESTINAL ENDOSCOPY N/A 1/26/2021    EGD BIOPSY performed by Yue Reyes DO at [facility]       Level of Consciousness: Disoriented and Uncooperative = 2    Level of Activity: Bedridden, unresponsive or quadriplegic = 4    Respiratory Pattern: Regular Pattern; RR 8-20 = 0    Breath Sounds: Diminshed bilaterally and/or crackles = 2    Sputum   ,  , Sputum How Obtained: Spontaneous cough  Cough: Strong, spontaneous, non-productive = 0    Vital Signs   /71   Pulse 88   Temp 97.8 °F (36.6 °C) (Axillary)   Resp 25   Ht 5' 6\" (1.676 m)   Wt 129 lb 1.6 oz (58.6 kg)   LMP 11/12/2012   SpO2 96%   BMI 20.84 kg/m²   SPO2 (COPD values may differ): 90-91% on room air or greater than 92% on FiO2 24- 28% = 1    Peak Flow (asthma only): not applicable = 0    RSI: 39-12 = Q6H or QID and Q4HPRN for dyspnea        Plan       Goals: medication delivery, mobilize retained secretions, volume expansion and improve oxygenation    Patient/caregiver was educated on the proper method of use for Respiratory Care Devices:  Yes      Level of patient/caregiver understanding able to:   ? Verbalize understanding   ? Demonstrate understanding       ? Teach back        ? Needs reinforcement       ? No available caregiver               ? Other:     Response to education:  Good     Is patient being placed on Home Treatment Regimen? Yes     Does the patient have everything they need prior to discharge? NA    Comments: patient assessed. Chart reviewed    Plan of Care: remain on current reg. Electronically signed by Madhavi Acevedo RCP on 2/3/2021 at 5:50 PM    Respiratory Protocol Guidelines     1. Assessment and treatment by Respiratory Therapy will be initiated for medication and therapeutic interventions upon initiation of aerosolized medication. 2. Physician will be contacted for respiratory rate (RR) greater than 35 breaths per minute.  Therapy will be held for heart rate (HR) greater than 140 beats per minute, pending direction from physician. 3. Bronchodilators will be administered via Metered Dose Inhaler (MDI) with spacer when the following criteria are met:  a. Alert and cooperative     b. HR < 140 bpm  c. RR < 30 bpm                d. Can demonstrate a 23 second inspiratory hold  4. Bronchodilators will be administered via Hand Held Nebulizer [facility]) to patients when ANY of the following criteria are met  a. Incognizant or uncooperative          b. Patients treated with HHN at Home        c. Unable to demonstrate proper use of MDI with spacer     d. RR > 30 bpm   5. Bronchodilators will be delivered via Metered Dose Inhaler (MDI), HHN, Aerogen to intubated patients on mechanical ventilation. 6. Inhalation medication orders will be delivered and/or substituted as outlined below. Aerosolized Medications Ordering and Administration Guidelines:    1. All Medications will be ordered by a physician, and their frequency and/or modality will be adjusted as defined by the patients Respiratory Severity Index (RSI) score. 2. If the patient does not have documented COPD, consider discontinuing anticholinergics when RSI is less than 9.  3. If the bronchospasm worsens (increased RSI), then the bronchodilator frequency can be increased to a maximum of every 4 hours. If greater than every 4 hours is required, the physician will be contacted. 4. If the bronchospasm improves, the frequency of the bronchodilator can be decreased, based on the patient's RSI, but not less than home treatment regimen frequency. 5. Bronchodilator(s) will be discontinued if patient has a RSI less than 9 and has received no scheduled or as needed treatment for 72  Hrs. Patients Ordered on a Mucolytic Agent:    1. Must always be administered with a bronchodilator.     2. Discontinue if patient experiences worsened bronchospasm, or secretions have lessened to the point that the patient is able to clear them with a cough. Anti-inflammatory and Combination Medications:    1. If the patient lacks prior history of lung disease, is not using inhaled anti-inflammatory medication at home, and lacks wheezing by examination or by history for at least 24 hours, contact physician for possible discontinuation.

## 2021-02-03 NOTE — CONSULTS
Gastroenterology Consult Note    Patient:   Rene Fuentes   :    1960   Facility:     [address] [facility] [address]  [address] [facility] [address]   Referring/PCP: Maryanne Lucio  Date:     2/3/2021  Consultant:   Larissa Rashid DO    Subjective: This 64 y.o. female was admitted 2021 with a diagnosis of \"Acute hepatic encephalopathy [K72.00]\" and is seen in consultation regarding confusion    63 yo female with history of cirrhosis, COPD, seizures who was admitted recently with addominal distension and portal vein thrombosis. EGD performed demonstrated PHG but no varices. Anticoagulation was recommended. Patient found with altered mental status and taken in by EMS from house. Still altered this morning with mild asterixis. On lactulose enemas. Planning paracentesis. Urine positive for amphetamines and cannabis.     Past Medical History:   Diagnosis Date    Back pain     COPD (chronic obstructive pulmonary disease) (HCC)     Hepatitis B     Hepatitis C antibody positive in blood 2018    Seizures ([address]      Past Surgical History:   Procedure Laterality Date    APPENDECTOMY      CARPAL TUNNEL RELEASE       SECTION      ROTATOR CUFF REPAIR Right     SKIN GRAFT      TUBAL LIGATION      UPPER GASTROINTESTINAL ENDOSCOPY  2021    EDG BIOPSY    UPPER GASTROINTESTINAL ENDOSCOPY N/A 2021    EGD BIOPSY performed by Noemi June DO at [facility]       Social:   Social History     Tobacco Use    Smoking status: Former Smoker     Packs/day: 1.00     Years: 40.00     Pack years: 40.00     Quit date: 2020     Years since quittin.4    Smokeless tobacco: Never Used   Substance Use Topics    Alcohol use: No     Family:   Family History   Problem Relation Age of Onset    Diabetes Father     Heart Disease Father        Scheduled Medications:    apixaban  5 mg Oral BID    pantoprazole  40 mg Intravenous Daily    92.0 92.7   * 125*     BMP:   Recent Labs     02/02/21  1337 02/03/21  0445   * 131*   K 3.9 3.7    101   CO2 25 21   BUN 18 20   CREATININE <0.5* <0.5*     LIVER PROFILE:   Recent Labs     02/02/21  1337 02/03/21  0445   AST 60* 86*   ALT 56* 69*   PROT 6.5 6.4   BILITOT 1.7* 1.6*   ALKPHOS 129 116     PT/INR:   Recent Labs     02/02/21  1337   INR 1.52*       IMAGING:  Ct Head Wo Contrast    Result Date: 2/2/2021  EXAMINATION: CT OF THE HEAD WITHOUT CONTRAST  2/2/2021 3:27 pm TECHNIQUE: CT of the head was performed without the administration of intravenous contrast. Dose modulation, iterative reconstruction, and/or weight based adjustment of the mA/kV was utilized to reduce the radiation dose to as low as reasonably achievable. COMPARISON: 05/25/2019 HISTORY: ORDERING SYSTEM PROVIDED HISTORY: ams, ? fall off chair TECHNOLOGIST PROVIDED HISTORY: Reason for exam:->ams, ? fall off chair Has a \"code stroke\" or \"stroke alert\" been called? ->No Decision Support Exception->Emergency Medical Condition (MA) Reason for Exam: ams Acuity: Unknown Type of Exam: Unknown FINDINGS: BRAIN/VENTRICLES: There is no acute intracranial hemorrhage, mass effect or midline shift. No abnormal extra-axial fluid collection. The gray-white differentiation is maintained without evidence of an acute infarct. There is no evidence of hydrocephalus. ORBITS: The visualized portion of the orbits demonstrate no acute abnormality. SINUSES: The visualized paranasal sinuses and mastoid air cells demonstrate no acute abnormality. SOFT TISSUES/SKULL:  No acute abnormality of the visualized skull or soft tissues. No acute intracranial abnormality. Ct Cervical Spine Wo Contrast    Result Date: 2/2/2021  EXAMINATION: CT OF THE CERVICAL SPINE WITHOUT CONTRAST 2/2/2021 3:28 pm TECHNIQUE: CT of the cervical spine was performed without the administration of intravenous contrast. Multiplanar reformatted images are provided for review. be contributing to mental status changes      Roslyn Navarrete DO  8:14 AM 2/3/2021            [address]     Phone: [phone]     Fax: [phone]

## 2021-02-04 NOTE — PROGRESS NOTES
Patient has been seen by Dr. Niurka Renee. Patient left by wheelchair and signed AMA, both IV's have been removed. Drummond catheter removed. Patient dressed and stated she had all of her belongings before being transported down to [facility], patient refused to wait in ICU room 2.

## 2021-02-04 NOTE — FLOWSHEET NOTE
02/03/21 2144   Restraint Order   Length of Order 24   Order Upon Application Yes   Face to Face Yes   Assessment   Less Restrictive Alternative PC;RP;RE;DE;RO;DA;PM;AL   Special Consideration/Risk Factors N   Justification   Clinical Justification L;T;E;H;U   Education   Discontinuation Criteria Absence   Criteria Explained Yes   Patient's Response RT   Family Notification D   Restraint Monitoring Q60 Minutes   Visual/Safety Check (q 60 mins) AG;DL;CF;O   Restraint  Monitoring Q2 Hours   Circulation NS   Range of Motion P   Fluids N   Food/Meal N   Elimination UC   Restraint Type   Soft Restraint R Wrist START   Soft Restraint L Wrist START   Vital Signs   Pulse 83   Resp 20   BP (!) 88/52   MAP (mmHg) (!) 63   Bilateral soft wrist restraints initiated. Dr Wang Lombardi aware. Patient refused education.

## 2021-02-04 NOTE — PROGRESS NOTES
Progress Note    Admit Date:  2/2/2021    Was transferred to ICU , improved mentation with lactulose via NG  S.p para    Subjective:  Ms. Akhil Vaughn is seen sitting up in bed, anxious to go home  On RA   Denies any issues, no abd pain , sob . Pedal edema and abd distention improved   Wishes to sign of AMA    Objective:   Patient Vitals for the past 4 hrs:   BP Temp Temp src Pulse Resp SpO2   02/04/21 0700 (!) 119/99   75 19 99 %   02/04/21 0654  98.6 °F (37 °C) Oral      02/04/21 0500 109/70   82 17 96 %            Intake/Output Summary (Last 24 hours) at 2/4/2021 1019  Last data filed at 2/3/2021 2000  Gross per 24 hour   Intake 1620 ml   Output 1025 ml   Net 595 ml       Physical Exam:        General:  Glouster Litten appearing middle aged female, up in bed no distress  On RA  Awake alert and fully oriented  Neck: No JVD, no carotid bruit, no thyromegaly  Chest: improved alva air entry with no wheeze today   Cardiovascular:  RRR S1S2 heard, no murmurs or gallops  Abdomen: less distention, non tender . Ascites + no organomegaly, BS present  Extremities: improving 2 + edema from abd wall to feet  Mild erythema with no cellulitis on both shins  Neurological :  Non focal today .  Fully awake, alert and oriented              Scheduled Meds:   albumin human  75 g Intravenous Once    ipratropium-albuterol  1 ampule Inhalation Q4H WA    mupirocin   Nasal BID    [Held by provider] apixaban  5 mg Oral BID    lactulose  30 g Oral TID    pantoprazole  40 mg Intravenous Daily    spironolactone  50 mg Oral BID    sodium chloride flush  10 mL Intravenous 2 times per day    lactulose enema   Rectal Daily    furosemide  20 mg Intravenous Daily    cefTRIAXone (ROCEPHIN) IV  1,000 mg Intravenous Q24H    doxycycline (VIBRAMYCIN) IV  100 mg Intravenous Q12H     PRN Meds:  midazolam, sodium chloride flush, promethazine **OR** ondansetron, acetaminophen **OR** acetaminophen, potassium chloride **OR** potassium alternative oral replacement **OR** potassium chloride, magnesium sulfate      Data:  CBC:   Recent Labs     02/02/21  1423 02/03/21  0538 02/04/21  0424   WBC 9.3 9.4 8.3   HGB 12.0 12.4 10.1*   HCT 35.6* 37.0 29.9*   MCV 92.0 92.7 91.0   * 125* 105*     BMP:   Recent Labs     02/02/21  1337 02/03/21  0445 02/04/21  0424   * 131* 135*   K 3.9 3.7 3.3*    101 104   CO2 25 21 23   BUN 18 20 19   CREATININE <0.5* <0.5* <0.5*     LIVER PROFILE:   Recent Labs     02/02/21  1337 02/03/21  0445   AST 60* 86*   ALT 56* 69*   BILITOT 1.7* 1.6*   ALKPHOS 129 116     PT/INR:   Recent Labs     02/02/21  1337   PROTIME 17.7*   INR 1.52*       CULTURES    Blood cx x2: pending     RADIOLOGY    CT Head WO Contrast    Final Result   No acute intracranial abnormality. CT Cervical Spine WO Contrast    Final Result   1. No acute abnormality of the cervical spine   2. Left upper lobe ground-glass infiltrate suspicious for pneumonia. Correlate with chest x-ray         XR CHEST PORTABLE   Final Result   Pulmonary venous congestion.       Subtle increased density in the right lung base laterally may represent   atelectasis or mild pneumonia         US GUIDED PARACENTESIS    (Results Pending)     Assessment/Plan:    Acute metabolic & toxic encephalopathy  -  poss hepatic encephalopathy w/ hyperammonemia and drug abuse   - UDS + for amphetamines & cannabis   - CT head: no acute intracranial abnormality & CT spine with no acute findings  - UA without findings of infection  -  Blood cx pending  -  Placed on lactulose enema with no improvement   -  Later needed ICU transfer for encephalopathy, placed NG and given lactulose with quick improvement   - today she is awake alert and fully oriented , sign out AMA    COPD AE  - CXR w/ pulmonary venous congestion  - CT showed possible upper lobe infection  - No white count, PCT: 0.3  - no significant hypoxia  - start Solumedrol and later dcd  HHN, on Doxy D#3     Liver cirrhosis with

## 2021-02-04 NOTE — PROGRESS NOTES
Patient handoff given to nurse Ryan Sear, patient resting with eyes closed at handoff.  George Caraballo RN

## 2021-02-04 NOTE — PROGRESS NOTES
Patient requesting to Leave [place], Dr. Guillermo Marin made aware, and Dr. Tommy Hancock said he is headed over to see patient.

## 2021-02-04 NOTE — PROGRESS NOTES
Patient had large BM cleaned patient and changed all linens. Patient screaming out for nurse while at bedside stating \"Im hungry and Im going home\". Patient difficult to redirect.

## 2021-02-04 NOTE — PROGRESS NOTES
Shift assessment complete see doc flow sheet. Respirations easy and even on room air. Patient is alert and oriented X4, which from report is an improvement since yesterday. Patient still restrained, tried hitting staff last night with call light and was harming self with bed rails. Patient does scream out, as opposed to using call light, and gets agitated quickly by thrashing self in bed while staff in room. Patient has requested to have diet started and has been made aware it will have to be approved by the Dr. jones and she agreed. Patient has had 3 BM's this morning, all of which have been watery/mucous and brown in color. Potassium has also been replaced. Patient has received 2 blankets for warmth and has been repositioned. Patient shows no s/s of injury from restraints. Patient denied any needs before leaving room.

## 2021-02-04 NOTE — PROGRESS NOTES
Pulmonary & Critical Care Medicine ICU Progress Note    CC: Found down, obtunded, then agitated    Events of Last 24 hours:   NG for lactulose  U/S guided paracentesis, 2 L off, given albumin  5 BM yesterday, now oriented X 4     Invasive Lines: IV: Peripheral    MV: None     / / /   Recent Labs     21  0855   PHART 7.499*   RQP1MHZ 35.2   PO2ART 103.8       IV:      Vitals:  Blood pressure 109/70, pulse 82, temperature 98.8 °F (37.1 °C), temperature source Oral, resp. rate 17, height 5' 6\" (1.676 m), weight 129 lb 1.6 oz (58.6 kg), last menstrual period 2012, SpO2 96 %. on room air  Temp  Av.2 °F (36.8 °C)  Min: 97.1 °F (36.2 °C)  Max: 98.8 °F (37.1 °C)    Intake/Output Summary (Last 24 hours) at 2021 0603  Last data filed at 2/3/2021 2000  Gross per 24 hour   Intake 1620 ml   Output 1025 ml   Net 595 ml     EXAM:  General: Appears chronically unwell  Eyes: PERRL. No sclera icterus. No conjunctival injection. ENT: No discharge. Pharynx clear. Neck: Trachea midline. Normal thyroid. Resp: No accessory muscle use. No crackles. No wheezing. No rhonchi. No dullness on percussion. CV: Regular rate. Regular rhythm. No mumur or rub. No edema. GI: Non-tender. Non-distended. Skin: Warm and dry. Neuro: A&O to person, place, year.       Scheduled Meds:   albumin human  75 g Intravenous Once    ipratropium-albuterol  1 ampule Inhalation Q4H WA    mupirocin   Nasal BID    [Held by provider] apixaban  5 mg Oral BID    lactulose  30 g Oral TID    pantoprazole  40 mg Intravenous Daily    spironolactone  50 mg Oral BID    sodium chloride flush  10 mL Intravenous 2 times per day    lactulose enema   Rectal Daily    furosemide  20 mg Intravenous Daily    cefTRIAXone (ROCEPHIN) IV  1,000 mg Intravenous Q24H    doxycycline (VIBRAMYCIN) IV  100 mg Intravenous Q12H     PRN Meds:  midazolam, sodium chloride flush, promethazine **OR** ondansetron, acetaminophen **OR** acetaminophen, potassium chloride **OR** potassium alternative oral replacement **OR** potassium chloride, magnesium sulfate    Results:  CBC:   Recent Labs     02/02/21  1423 02/03/21  0538 02/04/21  0424   WBC 9.3 9.4 8.3   HGB 12.0 12.4 10.1*   HCT 35.6* 37.0 29.9*   MCV 92.0 92.7 91.0   * 125* 105*     BMP:   Recent Labs     02/02/21  1337 02/03/21  0445 02/04/21  0424   * 131* 135*   K 3.9 3.7 3.3*    101 104   CO2 25 21 23   BUN 18 20 19   CREATININE <0.5* <0.5* <0.5*     LIVER PROFILE:   Recent Labs     02/02/21  1337 02/03/21  0445   AST 60* 86*   ALT 56* 69*   BILITOT 1.7* 1.6*   ALKPHOS 129 116     Cultures:      2/2/2021 SARS-CoV-2 negative    Chest imaging was reviewed by me and showed:   2/3/2021 CT cervical spine shows groundglass infiltrate left upper lobe which is changed from the 1/24/2021 CTPA when groundglass infiltrates were also present but in a different distribution    ASSESSMENT:  · End-stage liver disease with cirrhosis   · Hepatic encephalopathy   · Ascites s/p U/S guided para 2/4/21 with 2 L off, no e/o infection  · Recent diagnosis Candida esophagitis  · Portal hypertensive gastropathy  · Portal vein thrombosis  · Duodenal ulcers  · COPD  · Hyponatremia  · Waxing and waning groundglass infiltrates of the lung - cannot rule out acute infection/pneumonia  · H/O polysubstance abuse, current tox screen with amphetamines and cannabis    PLAN:  · Lactulose   · Apixaban 5 mg twice daily for portal vein thrombosis   · CTX day #3, Doxy day #3 -- change to PO Omnicef/Doxy  · Lasix and Aldactone PO   · Bactroban  · Okay with me for transfer to floor

## 2021-02-05 NOTE — DISCHARGE SUMMARY
Name:  Augustina Hallman  Room:  3002/3002-01  MRN:    [id number]    [facility] Discharge Summary      This discharge summary is in conjunction with a complete physical exam done on the day of discharge. Discharging Physician: Dr. Diana Curriet: 2/2/2021  Discharge:  2/4/2021       PT SIGNED OUT AMA   THIS DC SUMMARY IS INCOMPLETE AS PT SIGNED OUT BEFORE FULL TREATMENT      HPI taken from admission H&P:      The patient is a 64 y.o. female with COPD, liver cirrhosis, hep B/C, prior seizures who presented to [facility] ED with complaint of altered mental status. Patient is unable to provide any history upon arrival.  History is obtained via ED providers note.     \"Tish Kincaid is a 64 y. o. female  who presents to the ED complaining of Altered mental status.  Unknown LKW.  EMS called by family at the house.  Reported hx of Hep B and C and drug abuse.   Minimal history obtainable.  Patient unable to provide any history.  Recent hx of admission with paracentesis performed and diuresed\"     Diagnoses this Admission and Hospital Course     Acute metabolic & toxic encephalopathy  -  poss hepatic encephalopathy w/ hyperammonemia and drug abuse   - UDS + for amphetamines & cannabis   - CT head: no acute intracranial abnormality & CT spine with no acute findings  - UA without findings of infection  -  Blood cx: NGTD  -  Placed on lactulose enema with no improvement   -  Later needed ICU transfer for encephalopathy, placed NG and given lactulose with quick improvement   - today she is awake alert and fully oriented , signed out AMA today     COPD AE  - CXR w/ pulmonary venous congestion  - CT showed possible upper lobe infection  - No white count, PCT: 0.3  - no significant hypoxia  - start Solumedrol and later dcd  HHN, on Doxy D#3     Liver cirrhosis with decompensated ascites   Hyperammonemia  Coagulopathy   - Pt w/ recurrent ascites, abdominal distention s/p EGD, found candidal esophagitis & portal hypertensive gastropathy & superficial ulcerations in duodenal bulb  - Suspect some component of acute hepatic encephalopathy given elevated ammonia levels which have not been elevated in the past  - unable to take p.o., give lactulose enemas daily  - Later needed ICU transfer for encephalopathy, placed NG and given lactulose with quick improvement   - Recent admission with paracentesis-->repeat paracentesis & panel, on Rocephin D#2 for poss SBP   - Cont home diuretics--> lasix changed to IV as pt unable to take PO  - GI consulted  - s/p paracentesis with 2L removed and IV albumin given      Hyponatremia  - Improved compared to prior  - Continue home medications as tolerated  - 133 > 131     TCP  - 2/2 known liver disease  - No acute bleeding, okay to continue home Eliquis but monitor closely      Portal Vein Thrombosis  - Started on Eliquis during last admission  - Continue Eliquis      Substance abuse  - UDS positive for amphetamines and cannabis  - counselled  on cessation when mental status improved     Abnormal EKG  - artifact making it difficult to assess   - Repeat EKG--> nursing communication to notify on-call provider when EKG is complete - repeat non-acute    Procedures (Please Review Full Report for Details)  None     Consults    Pulmonology   GI    Physical Exam at Discharge:    BP 99/67   Pulse 76   Temp 97.7 °F (36.5 °C) (Axillary)   Resp 26   Ht 5' 6\" (1.676 m)   Wt 129 lb 1.6 oz (58.6 kg)   LMP 11/12/2012   SpO2 93%   BMI 20.84 kg/m²     General:  eldelry appearing middle aged female, up in bed no distress  On RA  Awake alert and fully oriented  Neck: No JVD, no carotid bruit, no thyromegaly  Chest: improved alva air entry with no wheeze today   Cardiovascular:  RRR S1S2 heard, no murmurs or gallops  Abdomen: less distention, non tender . Ascites + no organomegaly, BS present  Extremities: improving 2 + edema from abd wall to feet  Mild erythema with no cellulitis on both shins  Neurological :  Non focal today .  Fully awake, alert and oriented    CBC:   Recent Labs     02/02/21  1423 02/03/21  0538 02/04/21  0424   WBC 9.3 9.4 8.3   HGB 12.0 12.4 10.1*   HCT 35.6* 37.0 29.9*   MCV 92.0 92.7 91.0   * 125* 105*     BMP:   Recent Labs     02/03/21  0445 02/04/21  0424   * 135*   K 3.7 3.3*    104   CO2 21 23   BUN 20 19   CREATININE <0.5* <0.5*     LIVER PROFILE:   Recent Labs     02/03/21  0445   AST 86*   ALT 69*   BILITOT 1.6*   ALKPHOS 116     UA:  Recent Labs     02/02/21  1621   COLORU Yellow   PHUR 6.5  6.5   CLARITYU Clear   SPECGRAV 1.020   LEUKOCYTESUR Negative   UROBILINOGEN 1.0   BILIRUBINUR Negative   BLOODU Negative   GLUCOSEU Negative     CULTURES  SARS-CoV-2, NAAT Not Detected    Blood Cx: NGTD  Urine Cx: NGTD   Body Fluid Cx: NGTD    RADIOLOGY  US GUIDED PARACENTESIS   Final Result   Successful ultrasound guided paracentesis. CT Head WO Contrast   Final Result   No acute intracranial abnormality. CT Cervical Spine WO Contrast   Final Result   1. No acute abnormality of the cervical spine   2. Left upper lobe ground-glass infiltrate suspicious for pneumonia. Correlate with chest x-ray         XR CHEST PORTABLE   Final Result   Pulmonary venous congestion. Subtle increased density in the right lung base laterally may represent   atelectasis or mild pneumonia             Discharge Medications     Medication List      ASK your doctor about these medications    albuterol sulfate  (90 Base) MCG/ACT inhaler  Commonly known as: Ventolin HFA  Inhale 2 puffs into the lungs 4 times daily as needed for Wheezing     apixaban 5 MG Tabs tablet  Commonly known as: ELIQUIS  2 bid- 6 days, 1 bid thereafter     furosemide 40 MG tablet  Commonly known as: Lasix  Take 1 tablet by mouth daily     GABAPENTIN PO     nystatin 006950 UNIT/ML suspension  Commonly known as: MYCOSTATIN  Take 5 mLs by mouth 4 times daily for 7 days  Ask about: Should I take this medication?      pantoprazole 40 MG tablet  Commonly known as: PROTONIX  Take 1 tablet by mouth 2 times daily (before meals)     predniSONE 20 MG tablet  Commonly known as: DELTASONE  1 1/2 qd- 3 days,1 qd- 3 days,1/2 qd- 3 days     SEROQUEL PO     spironolactone 50 MG tablet  Commonly known as: ALDACTONE     Symbicort 80-4.5 MCG/ACT Aero  Generic drug: budesonide-formoterol          Patient signed out [place]

## 2021-02-16 NOTE — CARE COORDINATION
Readmission Assessment  Number of Days since last admission?: 1-7 days  Previous Disposition: Home with Family  Who is being Interviewed: (chart review)  What was the patient's/caregiver's perception as to why they think they needed to return back to the hospital?: Other (Comment)(worsening medical condition)  Did you visit your Primary Care Physician after you left the hospital, before you returned this time?: No  Why weren't you able to visit your PCP?: Other (Comment)(discharged 6 days prior)  Did you see a specialist, such as Cardiac, Pulmonary, Orthopedic Physician, etc. after you left the hospital?: No  Who advised the patient to return to the hospital?: Other (Comment)(family found on floor)  Does the patient report anything that got in the way of taking their medications?: No

## 2021-02-21 PROBLEM — R41.82 AMS (ALTERED MENTAL STATUS): Status: ACTIVE | Noted: 2021-01-01

## 2021-02-21 NOTE — PROGRESS NOTES
Spoke with Pt's mother, they are on their way to the hospital at this time. Only people mother wants to be allowed to visit is her immediate family which includes pt's father ad siblings. Mother states if pt codes she does not want compression or defib.   Bennett Arnold RN

## 2021-02-21 NOTE — ED NOTES
notified of patient critical lab value C02 14, Lactic Acid 10.5, Glucose 8. New orders for 2amp of D50.      Sol Gibson RN  02/21/21 2044

## 2021-02-21 NOTE — PROGRESS NOTES
After primary RN spoke with mother, Hardeep Traore spoke with her to be second phone consent. Karine May states she is the decision maker pt pt's request, pt is estranged from her  and has adult children not to be decision maker and Ms. Daphne Miles does not want the children to visit the patient. Ms Daphne Miles states she only wants herself, father, aJsmyne Bass, and pt's sister, Mehul Osmar allowed to visit. When writer asked to consent to change code, specifically if her heart were to stop would she want staff to perform CPR. Ms Daphne Mlies stated she is on the way into the hospital and would like to make that decision when at the bedside. Ms Daphne Miles would like pt to remain FULL CODE until she sees the patient this evening.  Dina Brooks Clinical

## 2021-02-21 NOTE — PROGRESS NOTES
Page to Dr Cecil Rae by Primary RN. Rt at bedside. SpO2 72%, FiO2 100%. Pt is Hypotensive 80s/50s. PEEP 10. 1 amp Bicarb, 1amp D50 given. EPOC pH 6.9. Clinical Called. 6:27 PM    Orders to follow. Primary RN speaking to Dr Cecil Rae.

## 2021-02-21 NOTE — ED NOTES
Decision to intubate, per Dr. Rubén Camacho. RT called to ED.       Roxanne Wilson RN  02/21/21 8704

## 2021-02-21 NOTE — ED NOTES
20mg Etomidate given 1446    80mg Rocuronium given 1446    Pt sucessfully intubated 1448, cap. color change noted, 23cm at lip placement.       Mandy Ly RN  02/21/21 5907

## 2021-02-21 NOTE — PLAN OF CARE
Hepatic encephalopathy  Polysubstance abuse, cirrhosis  Recurrent admission , recenlty signed out AMA  Now on vent

## 2021-02-21 NOTE — ED NOTES
Pt brought in by EMS from home for reports initially of abd pain and swelling. Pt has hx of cirrhosis, Hep C. Pt's abd is rounded, purple, and taut. Pt unable to answer questions at this time. Pt on non-rebreather mask upon arrival. Pt moved from room 10 to room 1 and RT called to place on bi-pap per Dr. Albertina Odom.       Danuta Hobson RN  02/21/21 1400

## 2021-02-21 NOTE — ED NOTES
0 - Called a/s for pulmonary on call  1540 - perfect served Dr. Kaity Lindsay for [place]  02/21/21 1541    26 - Dr. Kaity Lindsay called back. [place]  02/21/21 1600    1629 - 2nd call to Pulmonary     [place]  02/21/21 1630    1645 - Dr. Magdy Grace called back.       [place]  02/21/21 1658

## 2021-02-21 NOTE — ED PROVIDER NOTES
Emergency Physician Note  [address] ED  [address] [address]  Dept: [phone]  Loc: [phone]  Open Note Time:  3:45 PM EST    Chief Complaint  Abdominal Pain (Pt brought in by EMS for c/o abd pain, swelling. Pt has hx of liver failure, appears to have AMS. Pt does not wear O2 at home, per EMS report was low 80's upon arrival.)       History of Present Illness  Sarah Rios is a 64 y.o. female  has a past medical history of Back pain, COPD (chronic obstructive pulmonary disease) ([facility] [address] Hepatitis B, Hepatitis C antibody positive in blood, and Seizures ([facility] [address] who presents to the ED initially for abdominal pain. All history is obtained by EMS as the patient is currently altered and is a poor historian. Patient has a history of cirrhosis and hepatitis C. She was found to be hypoxic with oxygen saturations in the 70% and requiring nonrebreather. Patient is moaning and groaning will occasionally give me one-word answers however very altered    Unable to assess review of systems as patient is altered    Unless otherwise stated in this report or unable to obtain because of the patient's clinical or mental status as evidenced by the medical record, this patient's positive and negative responses for review of systems, constitutional, psych, eyes, ENT, cardiovascular, respiratory, gastrointestinal, neurological, genitourinary, musculoskeletal, integument systems and systems related to the presenting problem are either stated in the preceding paragraph or were not pertinent or were negative for the symptoms and/or complaints related to the medical problem. I have reviewed the following from the nursing documentation:      Prior to Admission medications    Medication Sig Start Date End Date Taking?  Authorizing Provider   apixaban (ELIQUIS) 5 MG TABS tablet 2 bid- 6 days, 1 bid thereafter 1/27/21   Zahra Lawrence MD   furosemide (LASIX) 40 MG tablet Take 1 tablet by mouth daily 21  Bipin Jason MD   pantoprazole (PROTONIX) 40 MG tablet Take 1 tablet by mouth 2 times daily (before meals) 21   Bipin Jason MD   predniSONE (DELTASONE) 20 MG tablet 1 1/2 qd- 3 days,1 qd- 3 days,1/2 qd- 3 days 21   Bipin Jason MD   SYMBICORT 80-4.5 MCG/ACT AERO  20   Historical Provider, MD   spironolactone (ALDACTONE) 50 MG tablet TAKE 1 TABLET BY MOUTH TWICE A DAY 20   Historical Provider, MD   albuterol sulfate HFA (VENTOLIN HFA) 108 (90 Base) MCG/ACT inhaler Inhale 2 puffs into the lungs 4 times daily as needed for Wheezing 10/23/20   Bhavana Danielle MD   GABAPENTIN PO Take 800 mg by mouth 3 times daily Unsure of dosage     Historical Provider, MD   QUEtiapine Fumarate (SEROQUEL PO) Take 100 mg by mouth nightly     Historical Provider, MD       Allergies as of 2021 - Review Complete 2021   Allergen Reaction Noted    Flexeril [cyclobenzaprine]  2017    Ultram [tramadol hcl]  2012    Robaxin [methocarbamol] Nausea And Vomiting 2017       Past Medical History:   Diagnosis Date    Back pain     COPD (chronic obstructive pulmonary disease) ([facility] [address]     Hepatitis B 2021    Hepatitis C antibody positive in blood 2018    Seizures ([facility] [address]         Surgical History:   Past Surgical History:   Procedure Laterality Date    APPENDECTOMY      CARPAL TUNNEL RELEASE       SECTION      ROTATOR CUFF REPAIR Right     SKIN GRAFT      TUBAL LIGATION      UPPER GASTROINTESTINAL ENDOSCOPY  2021    EDG BIOPSY    UPPER GASTROINTESTINAL ENDOSCOPY N/A 2021    EGD BIOPSY performed by Yue Reyes DO at [facility]        Family History:    Family History   Problem Relation Age of Onset    Diabetes Father     Heart Disease Father        Social History     Socioeconomic History    Marital status:      Spouse name: Not on file  Number of children: Not on file    Years of education: Not on file    Highest education level: Not on file   Occupational History    Not on file   Social Needs    Financial resource strain: Not on file    Food insecurity     Worry: Not on file     Inability: Not on file    Transportation needs     Medical: Not on file     Non-medical: Not on file   Tobacco Use    Smoking status: Former Smoker     Packs/day: 1.00     Years: 40.00     Pack years: 40.00     Quit date: 2020     Years since quittin.4    Smokeless tobacco: Never Used   Substance and Sexual Activity    Alcohol use: No    Drug use: Not Currently     Types: Methamphetamines, Marijuana    Sexual activity: Not on file   Lifestyle    Physical activity     Days per week: Not on file     Minutes per session: Not on file    Stress: Not on file   Relationships    Social connections     Talks on phone: Not on file     Gets together: Not on file     Attends Sabianism service: Not on file     Active member of club or organization: Not on file     Attends meetings of clubs or organizations: Not on file     Relationship status: Not on file    Intimate partner violence     Fear of current or ex partner: Not on file     Emotionally abused: Not on file     Physically abused: Not on file     Forced sexual activity: Not on file   Other Topics Concern    Not on file   Social History Narrative    Not on file       Nursing notes reviewed. ED Triage Vitals   Enc Vitals Group      BP 21 1355 (!) 101/58      Pulse 21 1338 136      Resp 21 1338 30      Temp 21 1338 96.8 °F (36 °C)      Temp Source 21 1338 Temporal      SpO2 21 1338 90 %      Weight --       Height --       Head Circumference --       Peak Flow --       Pain Score --       Pain Loc --       Pain Edu? --       Excl. in GC? --        GENERAL:   Body mass index is 21.79 kg/m².   Somnolent, intermittently arousable to verbal stimuli  Well developed, detected with apparent distress. toxic-appearing, ill-appearing. HENT:   Normocephalic, Atraumatic, no lacerations. Oropharynx clear, no tonsilar inflammation, no tonsilar exudates,   no airway obstruction, moist mucous membranes. Uvula was midline and has symmetric rise. EYES:   Conjunctiva normal,   Pupils equal round and reactive to light but sluggish,   Extraocular movements normal.  NECK:  Trachea is midline. No stridor. Significantly distended veins  CHEST:  Elevated rate and regular rhythm, no murmurs/rubs/gallops,   normal S1/S2, chest wall non-tender. LUNGS:  Moderate respiratory distress. +abdominal retractions, +sternal retractions. Respiratory effort with diffuse rhonchi's bilaterally  Not Speaking comfortably in full sentences  ABDOMEN:  Soft, non-distended. Lower abdomen discolored with ecchymosis  Moderate guarding. No rebound. Diminished BS, no organomegaly, no abdominal masses  EXTREMITIES:  Moves extremities x4 spontaneously  Moderate pitting edema bilateral lower extremity with some weeping of the skin  distal pulses present and equal bilaterally. SKIN:  Warm, dry and intact. NEUROLOGIC:  Altered mental status. Not cooperative with neurological exam    LABS and DIAGNOSTIC RESULTS  EKG1  The Ekg interpreted by me shows  sinus tachycardia, cnhd=220 and Poor quality EKG with significant artifact with a rate of 139  Axis is   Normal  QTc is  within an acceptable range  Intervals and Durations are unremarkable. ST Segments: no acute change  Delta waves, Brugada Syndrome, and Short KS are not present. Prior EKG to compare with was available.  No significant changes compared to prior EKG from February 2, 2021    Cardiac Monitor Strip Interpretation  Interpreted by me  Monitor strip interpreted for greater than 10 seconds  Rhythm: sinus tachycardia  Rate: 130-140  Ectopy: none  ST Segments: normal    EKG2  The Ekg interpreted by me shows  sinus tachycardia, tdvr=197 with a rate of 140  Axis is   Normal  QTc is  within an acceptable range  Intervals and Durations are unremarkable. ST Segments: no acute change and normal  Delta waves, Brugada Syndrome, and Short [place] are not present. Prior EKG to compare with was available. No significant changes compared to prior EKG from February 2, 2021      RADIOLOGY  X-RAYS:  I have reviewed radiologic plain film image(s). ALL OTHER NON-PLAIN FILM IMAGES SUCH AS CT, ULTRASOUND AND MRI HAVE BEEN READ BY THE RADIOLOGIST. CT HEAD WO CONTRAST   Final Result   No acute intracranial abnormality. XR CHEST PORTABLE   Final Result   The endotracheal tube ends appropriately above the oly and below the   clavicular heads. The nasogastric tube ends in the stomach. The right   jugular vein central line ends in the distal SVC. Has essentially unchanged appearance of mild to moderate diffuse interstitial   and alveolar opacities throughout the lungs bilaterally. The appearance is   nonspecific and may be due to pulmonary edema or multilobar pneumonia,   including an active viral pneumonitis. No pneumothorax or significant pleural effusion. XR CHEST PORTABLE   Final Result   Small airspace infiltrate in a peripheral mid to lower right lung most   compatible with a focus of pneumonia            LABS  Results for orders placed or performed during the hospital encounter of 02/21/21   SARS-CoV-2 NAAT (Rapid)    Specimen: Nasopharyngeal Swab   Result Value Ref Range    SARS-CoV-2, NAAT Not Detected Not Detected   Culture, Blood 1    Specimen: Blood   Result Value Ref Range    Blood Culture, Routine       No Growth to date. Any change in status will be called.    Culture, Urine    Specimen: Urine, straight catheter   Result Value Ref Range    Organism Escherichia coli (A)     Urine Culture, Routine >100,000 CFU/ml  ID and sensitivity to follow      Culture, Blood 2    Specimen: Blood   Result Value Ref Range    Culture, Blood 2       No Growth to date. Any change in status will be called. Culture, Body Fluid    Specimen: Ascitic Fluid;  Body Fluid   Result Value Ref Range    Body Fluid Culture, Sterile No growth to date     Gram Stain Result       4+ WBC's (Mononuclear)  4+ RBC's  No organisms seen     CBC auto differential   Result Value Ref Range    WBC 1.1 (LL) 4.0 - 11.0 K/uL    RBC 3.25 (L) 4.00 - 5.20 M/uL    Hemoglobin 10.3 (L) 12.0 - 16.0 g/dL    Hematocrit 31.1 (L) 36.0 - 48.0 %    MCV 95.8 80.0 - 100.0 fL    MCH 31.7 26.0 - 34.0 pg    MCHC 33.1 31.0 - 36.0 g/dL    RDW 23.3 (H) 12.4 - 15.4 %    Platelets 71 (L) 842 - 450 K/uL    MPV 8.3 5.0 - 10.5 fL    PLATELET SLIDE REVIEW Decreased     SLIDE REVIEW see below     Path Consult Yes     Neutrophils % 43.0 %    Lymphocytes % 21.0 %    Monocytes % 13.0 %    Eosinophils % 2.0 %    Basophils % 0.0 %    Neutrophils Absolute 0.6 (LL) 1.7 - 7.7 K/uL    Lymphocytes Absolute 0.3 (L) 1.0 - 5.1 K/uL    Monocytes Absolute 0.1 0.0 - 1.3 K/uL    Eosinophils Absolute 0.0 0.0 - 0.6 K/uL    Basophils Absolute 0.0 0.0 - 0.2 K/uL    Bands Relative 4 0 - 7 %    Atypical Lymphocytes Relative 5 0 - 6 %    Metamyelocytes Relative 8 (A) %    Myelocyte Percent 4 (A) %    nRBC 1 (A) /100 WBC    Anisocytosis 1+ (A)     Hypochromia 1+ (A)     Poikilocytes 1+ (A)     Bryn Cells Occasional (A)     Target Cells Occasional (A)    Comprehensive Metabolic Panel w/ Reflex to MG   Result Value Ref Range    Sodium 127 (L) 136 - 145 mmol/L    Potassium reflex Magnesium 4.0 3.5 - 5.1 mmol/L    Chloride 96 (L) 99 - 110 mmol/L    CO2 14 (LL) 21 - 32 mmol/L    Anion Gap 17 (H) 3 - 16    Glucose 8 (LL) 70 - 99 mg/dL    BUN 20 7 - 20 mg/dL    CREATININE 1.4 (H) 0.6 - 1.1 mg/dL    GFR Non-African American 39 (A) >60    GFR  47 (A) >60    Calcium 7.5 (L) 8.3 - 10.6 mg/dL    Total Protein 4.5 (L) 6.4 - 8.2 g/dL    Albumin 1.9 (L) 3.4 - 5.0 g/dL    Albumin/Globulin Ratio 0.7 (L) 1.1 - 2.2    Total Bilirubin 3.0 (H) 0.0 - 1.0 mg/dL    Alkaline Phosphatase 79 40 - 129 U/L    ALT 63 (H) 10 - 40 U/L     (H) 15 - 37 U/L    Globulin 2.6 g/dL   Troponin   Result Value Ref Range    Troponin 0.04 (H) <0.01 ng/mL   Lactic acid, plasma   Result Value Ref Range    Lactic Acid 10.5 (HH) 0.4 - 2.0 mmol/L   Ammonia   Result Value Ref Range    Ammonia 102 (HH) 11 - 51 umol/L   Blood Gas, Arterial   Result Value Ref Range    pH, Arterial 6.840 (LL) 7.350 - 7.450    pCO2, Arterial 75.4 (HH) 35.0 - 45.0 mmHg    pO2, Arterial 65.2 (L) 75.0 - 108.0 mmHg    HCO3, Arterial 12.6 (L) 21.0 - 29.0 mmol/L    Base Excess, Arterial -21.2 (L) -3.0 - 3.0 mmol/L    Hemoglobin, Art, Extended 10.4 (L) 12.0 - 16.0 g/dL    O2 Sat, Arterial 71.3 (L) >92 %    Carboxyhgb, Arterial 0.3 0.0 - 1.5 %    Methemoglobin, Arterial 0.1 <1.5 %    TCO2, Arterial 14.9 Not Established mmol/L    O2 Content, Arterial 11 Not Established mL/dL    O2 Therapy See comment    APTT   Result Value Ref Range    aPTT 56.2 (H) 24.2 - 36.2 sec   Protime-INR   Result Value Ref Range    Protime 36.7 (H) 10.0 - 13.2 sec    INR 3.13 (H) 0.86 - 1.14   Lactic Acid, Plasma   Result Value Ref Range    Lactic Acid 9.6 (HH) 0.4 - 2.0 mmol/L   Troponin   Result Value Ref Range    Troponin 0.04 (H) <0.01 ng/mL   Troponin   Result Value Ref Range    Troponin 0.04 (H) <0.01 ng/mL   Blood gas, arterial   Result Value Ref Range    pH, Arterial 7.043 (LL) 7.350 - 7.450    pCO2, Arterial 36.1 35.0 - 45.0 mmHg    pO2, Arterial 434.6 (HH) 75.0 - 108.0 mmHg    HCO3, Arterial 9.6 (L) 21.0 - 29.0 mmol/L    Base Excess, Arterial -19.7 (L) -3.0 - 3.0 mmol/L    Hemoglobin, Art, Extended 9.4 (L) 12.0 - 16.0 g/dL    O2 Sat, Arterial 99.7 >92 %    Carboxyhgb, Arterial 0.3 0.0 - 1.5 %    Methemoglobin, Arterial 0.3 <1.5 %    TCO2, Arterial 10.7 Not Established mmol/L    O2 Content, Arterial 14 Not Established mL/dL    O2 Therapy See comment    Urinalysis Reflex to Culture    Specimen: Urine, clean catch Result Value Ref Range    Color, UA Yellow Straw/Yellow    Clarity, UA SL CLOUDY (A) Clear    Glucose, Ur Negative Negative mg/dL    Bilirubin Urine SMALL (A) Negative    Ketones, Urine Negative Negative mg/dL    Specific Gravity, UA 1.020 1.005 - 1.030    Blood, Urine Negative Negative    pH, UA 6.0 5.0 - 8.0    Protein, UA Negative Negative mg/dL    Urobilinogen, Urine 1.0 <2.0 E.U./dL    Nitrite, Urine Negative Negative    Leukocyte Esterase, Urine Negative Negative    Microscopic Examination Not Indicated     Urine Type see below     Urine Reflex to Culture Not Indicated    Drug screen multi urine   Result Value Ref Range    Amphetamine Screen, Urine POSITIVE (A) Negative <1000ng/mL    Barbiturate Screen, Ur Neg Negative <200 ng/mL    Benzodiazepine Screen, Urine Neg Negative <200 ng/mL    Cannabinoid Scrn, Ur POSITIVE (A) Negative <50 ng/mL    Cocaine Metabolite Screen, Urine Neg Negative <300 ng/mL    Opiate Scrn, Ur Neg Negative <300 ng/mL    PCP Screen, Urine Neg Negative <25 ng/mL    Methadone Screen, Urine Neg Negative <300 ng/mL    Propoxyphene Scrn, Ur Neg Negative <300 ng/mL    Oxycodone Urine Neg Negative <100 ng/ml    pH, UA 6.0     Drug Screen Comment: see below    Blood gas, arterial   Result Value Ref Range    pH, Arterial 6.951 (LL) 7.350 - 7.450    pCO2, Arterial 32.1 (L) 35.0 - 45.0 mmHg    pO2, Arterial 535.4 (HH) 75.0 - 108.0 mmHg    HCO3, Arterial 6.9 (L) 21.0 - 29.0 mmol/L    Base Excess, Arterial -23.6 (L) -3.0 - 3.0 mmol/L    Hemoglobin, Art, Extended 8.3 (L) 12.0 - 16.0 g/dL    O2 Sat, Arterial 99.8 >92 %    Carboxyhgb, Arterial 0.3 0.0 - 1.5 %    Methemoglobin, Arterial 0.3 <1.5 %    TCO2, Arterial 7.9 Not Established mmol/L    O2 Content, Arterial 13 Not Established mL/dL    O2 Therapy Unknown    Body fluid cell count with differential   Result Value Ref Range    Cell Count Fluid Type Ascitic Fluid     Color, Fluid Yellow     Appearance, Fluid Clear     Clot Eval. see below     Cynthia Langley Cell, Fluid 123 /cumm    RBC, Fluid 200 /cumm    Neutrophil Count, Fluid 34 %    Lymphocytes, Body Fluid 54 %    Monocyte Count, Fluid 12 %    Number of Cells Counted Fluid 100    Protein, body fluid   Result Value Ref Range    Protein, Fluid 0.6 Not Established g/dL    Fluid Type Ascitic Fluid    Lactate dehydrogenase, body fluid   Result Value Ref Range    LD, Fluid 94 Not Established U/L   Albumin, fluid   Result Value Ref Range    Albumin, Fluid <0.2 Not Established g/dL   Glucose, body fluid   Result Value Ref Range    Glucose, Fluid 60.0 Not Established mg/dL   Amylase, body fluid   Result Value Ref Range    Amylase, Fluid 7 Not Established U/L   Bilirubin, body fluid   Result Value Ref Range    Bilirubin, Fluid 0.3 Not Established mg/dL   Comprehensive Metabolic Panel w/ Reflex to MG   Result Value Ref Range    Sodium 129 (L) 136 - 145 mmol/L    Potassium reflex Magnesium 4.2 3.5 - 5.1 mmol/L    Chloride 91 (L) 99 - 110 mmol/L    CO2 11 (LL) 21 - 32 mmol/L    Anion Gap 27 (H) 3 - 16    Glucose 585 (H) 70 - 99 mg/dL    BUN 21 (H) 7 - 20 mg/dL    CREATININE 2.1 (H) 0.6 - 1.1 mg/dL    GFR Non-African American 24 (A) >60    GFR  29 (A) >60    Calcium 6.4 (L) 8.3 - 10.6 mg/dL    Total Protein 2.5 (L) 6.4 - 8.2 g/dL    Albumin 1.5 (L) 3.4 - 5.0 g/dL    Albumin/Globulin Ratio 1.5 1.1 - 2.2    Total Bilirubin 1.8 (H) 0.0 - 1.0 mg/dL    Alkaline Phosphatase 38 (L) 40 - 129 U/L     (H) 10 - 40 U/L     (H) 15 - 37 U/L    Globulin 1.0 g/dL   Lactic Acid, Plasma   Result Value Ref Range    Lactic Acid 20.3 (HH) 0.4 - 2.0 mmol/L   Glucose   Result Value Ref Range    Glucose 522 (H) 70 - 99 mg/dL   Blood Gas, Venous   Result Value Ref Range    pH, Dante 6.798 (LL) 7.350 - 7.450    pCO2, Dante 53.4 (H) 40.0 - 50.0 mmHg    pO2, Dante 33.4 25.0 - 40.0 mmHg    HCO3, Venous 8.1 (L) 23.0 - 29.0 mmol/L    Base Excess, Dante -25.0 (L) -3.0 - 3.0 mmol/L    O2 Sat, Dante 29 Not Established %    Carboxyhemoglobin 0.3 0.0 - 1.5 %    MetHgb, Dante 0.3 <1.5 %    TC02 (Calc), Dante 10 Not Established mmol/L    O2 Content, Dante 5 Not Established VOL %    O2 Therapy Unknown    Blood Gas, Arterial   Result Value Ref Range    pH, Arterial 6.798 (LL) 7.350 - 7.450    pCO2, Arterial 41.6 35.0 - 45.0 mmHg    pO2, Arterial 191.2 (H) 75.0 - 108.0 mmHg    HCO3, Arterial 6.3 (L) 21.0 - 29.0 mmol/L    Base Excess, Arterial -26.6 (L) -3.0 - 3.0 mmol/L    Hemoglobin, Art, Extended 7.7 (L) 12.0 - 16.0 g/dL    O2 Sat, Arterial 97.9 >92 %    Carboxyhgb, Arterial 0.3 0.0 - 1.5 %    Methemoglobin, Arterial 0.3 <1.5 %    TCO2, Arterial 7.6 Not Established mmol/L    O2 Content, Arterial 11 Not Established mL/dL    O2 Therapy Unknown    Blood Smear Review   Result Value Ref Range    Path Consult Reviewed    POCT glucose   Result Value Ref Range    Glucose 11 mg/dL    QC OK?  Yes    POCT Glucose   Result Value Ref Range    POC Glucose 72 70 - 99 mg/dl    Performed on ACCU-CHEK    POCT Glucose   Result Value Ref Range    POC Glucose 11 (LL) 70 - 99 mg/dl    Performed on ACCU-CHEK    POCT Glucose   Result Value Ref Range    POC Glucose 77 70 - 99 mg/dl    Performed on ACCU-CHEK    POCT Glucose   Result Value Ref Range    POC Glucose 57 (L) 70 - 99 mg/dl    Performed on ACCU-CHEK    POCT Arterial   Result Value Ref Range    POC Sodium 129 (L) 136 - 145 mmol/L    POC Potassium 3.9 3.5 - 5.1 mmol/L    POC Glucose 178 (H) 70 - 99 mg/dl    Calcium, Ion 1.13 1.12 - 1.32 mmol/L    pH, Arterial 6.899 (LL) 7.350 - 7.450    pCO2, Arterial 77.3 (HH) 35.0 - 45.0 mm Hg    pO2, Arterial 59.3 (L) 75.0 - 108.0 mm Hg    HCO3, Arterial 15.1 (L) 21.0 - 29.0 mmol/L    Base Excess, Arterial -18 (L) -3 - 3    O2 Sat, Arterial 68 (L) 93 - 100 %    Lactate 9.41 (HH) 0.40 - 2.00 mmol/L    POC Hematocrit 26.0 (L) 36.0 - 48.0 %    Hemoglobin 8.8 (L) 12.0 - 16.0 gm/dL    Sample Type ART     Performed on SEE BELOW    POCT Glucose   Result Value Ref Range    POC Glucose 107 (H) 70 - 99 mg/dl    Performed on ACCU-CHEK    POCT Glucose   Result Value Ref Range    POC Glucose 46 (LL) 70 - 99 mg/dl    Performed on ACCU-CHEK    POCT Glucose   Result Value Ref Range    POC Glucose 31 (LL) 70 - 99 mg/dl    Performed on ACCU-CHEK    POCT Glucose   Result Value Ref Range    POC Glucose 26 (LL) 70 - 99 mg/dl    Performed on ACCU-CHEK    POCT Glucose   Result Value Ref Range    POC Glucose 59 (L) 70 - 99 mg/dl    Performed on ACCU-CHEK    POCT Glucose   Result Value Ref Range    POC Glucose 66 (L) 70 - 99 mg/dl    Performed on ACCU-CHEK    POCT Glucose   Result Value Ref Range    POC Glucose >600 (AA) 70 - 99 mg/dl    Performed on ACCU-CHEK    POCT Glucose   Result Value Ref Range    POC Glucose 66 (L) 70 - 99 mg/dl    Performed on ACCU-CHEK    POCT Glucose   Result Value Ref Range    POC Glucose 561 (H) 70 - 99 mg/dl    Performed on ACCU-CHEK    EKG 12 Lead   Result Value Ref Range    Ventricular Rate 139 BPM    Atrial Rate 139 BPM    P-R Interval 80 ms    QRS Duration 70 ms    Q-T Interval 292 ms    QTc Calculation (Bazett) 444 ms    P Axis 81 degrees    R Axis 68 degrees    T Axis 57 degrees    Diagnosis       Sinus tachycardia with short DC with Premature supraventricular complexesNonspecific ST abnormalityConfirmed by ASTRID RIBERA MD ([id number]) on 2/21/2021 2:10:14 PM   EKG 12 Lead   Result Value Ref Range    Ventricular Rate 140 BPM    Atrial Rate 140 BPM    P-R Interval 116 ms    QRS Duration 72 ms    Q-T Interval 268 ms    QTc Calculation (Bazett) 409 ms    P Axis 77 degrees    R Axis 59 degrees    T Axis 51 degrees    Diagnosis       Sinus tachycardiaNonspecific ST and T wave abnormalityAbnormal ECGWhen compared with ECG of 21-FEB-2021 13:42,Premature supraventricular complexes are no longer PresentT wave inversion now evident in Lateral leadsConfirmed by Lesvia Cleaning MD, [address] ([id number]) on 2/22/2021 7:30:26 AM           PROCEDURES  INTUBATION (with RSI)   Implied consent (due to emergent nature of patient's condition) obtained. Patient preoxygenated, premedicated with etomidate and rocuronium meds and then, under video glidescope, a 7.0 mm cuffed endotracheal tube was placed through the mouth and through the vocal cords into the trachea. During intubation an assistant applied gentle pressure to the cricoid cartilage. Position confirmed by auscultation of lungs (good breath sounds bilateral) and stomach (GI sounds only), positive CO2 color change. Tube secured at 23cm. Pulse oximetry 100%. Patient was medicated post intubation with medication. Chest x-ray was ordered. No complications. CENTRAL VENOUS CATHETER   Implied consent (due to emergent nature of patient's condition) obtained. Strict sterile technique used throughout procedure. The area was prepped with chlorhexidine. Catheter placed in right  vein. Blood return noted from individual lumen(s). Flushed easily with Normal Saline. Post procedure chest x-ray was obtained. Patient tolerated procedure well. No complications.        MEDICAL DECISION MAKING    Procedures/interventions/images ordered for this visit  Orders Placed This Encounter   Procedures    SARS-CoV-2 NAAT (Rapid)    XR CHEST PORTABLE    XR CHEST PORTABLE    CT HEAD WO CONTRAST    CBC auto differential    Comprehensive Metabolic Panel w/ Reflex to MG    Troponin    Lactic acid, plasma    Ammonia    Blood Gas, Arterial    APTT    Protime-INR    Lactic Acid, Plasma    Troponin    Inpatient consult to Hospitalist    Inpatient consult to Critical Care    Droplet Plus Isolation    POCT glucose    POCT Glucose    POCT Glucose    POCT Glucose    EKG 12 Lead    EKG 12 Lead    PATIENT STATUS (FROM ED OR OR/PROCEDURAL) Inpatient       Medications ordered for this visit  Orders Placed This Encounter   Medications    0.9 % sodium chloride IV bolus 1,836 mL    piperacillin-tazobactam (ZOSYN) 4,500 mg in sodium chloride 0.9 % 100 mL IVPB (mini-bag)     Order Specific Question: Antimicrobial Indications     Answer: Other     Order Specific Question:   Other Abx Indication     Answer: Suspected Sepsis of Pulmonary Origin - Nosocomial    DISCONTD: vancomycin (VANCOCIN) 20 mg/kg in dextrose 5 % 250 mL IVPB     Order Specific Question:   Antimicrobial Indications     Answer: Other     Order Specific Question:   Other Abx Indication     Answer: Suspected Sepsis of Pulmonary Origin - Nosocomial    dextrose 50 % IV solution    vancomycin (VANCOCIN) 1,250 mg in dextrose 5 % 250 mL IVPB     Order Specific Question:   Antimicrobial Indications     Answer: Other     Order Specific Question:   Other Abx Indication     Answer:   see chart    norepinephrine (LEVOPHED) 16 mg in dextrose 5 % 250 mL infusion    propofol injection    OR Linked Order Group     acetaminophen (TYLENOL) tablet 650 mg     acetaminophen (TYLENOL) suppository 650 mg    lactulose (CHRONULAC) 10 GM/15ML solution 20 g    dextrose 5 % solution 1,000 mL    dextrose 5 % solution     Ilana Lombardo: cabinet override    dextrose 50 % IV solution       ED course notes for this visit       I wore N95 Envo mask with filter protection, facial shield and gloves when I evaluated the patient. I evaluated the patient in room 01/01    SEP-1 CORE MEASURE DATA  Classification: severe sepsis  Amount of fluids ordered: at least 30mL/kg based on entered actual weight at time of triage  Time at which sepsis was identified: 1433  Broad-spectrum antibiotics chosen: Zosyn and Vanco based on sepsis order-set for a suspected source of: Pulmonary - Community Acquired  Repeat lactate level: pending  On reassessment after fluid resuscitation:   Vitals update: BP (!) 106/57   Pulse 135   Temp 101 °F (38.3 °C) (Rectal)   Resp 21   LMP 11/12/2012   SpO2 100%   Cardiovascular exam shows elevated rate and rhythm with normal S1/S2 without murmurs, rubs or gallops.  Pulmonary exam shows a ventilated respiratory effort, diffuse rhonchorous signs,  Skin exam shows ashen color/with good perfusion perfusion. Extremity exam shows brisk capillary refill. Peripheral pulses were 2+ in the lower extremities. Discussed with Dr. Miguelina Espino, pulmonology, recommendations appreciated. I spoke with Dr. Bridgette Lees. We thoroughly discussed the history, physical exam, laboratory and imaging studies, as well as, emergency department course. Based upon that discussion, we've decided to admit Oscar Toscano for further observation and evaluation of Abdirahman Kincaid's mental status change. As I have deemed necessary from their history, physical and studies, I have considered and evaluated Oscar Toscano for the following diagnoses:  DIABETES, INTRACRANIAL HEMORRHAGE, MENINGITIS, SEPSIS SUBARACHNOID HEMORRHAGE, SUBDURAL HEMATOMA, & STROKE,    Hypoxemia/ischemic encephalopathy, hepatic encephalopathy   Seizure or postictal state   Alterations of glucose such as hypoglycemia and hyperglycemia    Alterations in perfusions such as hypotension and hypoperfusion    Alterations in electrolytes such as disturbances in sodium or calcium   Infectious processes such as sepsis from a pneumonia or urinary tract infection    Substance use or withdrawal, especially alcohol and drugs    Medication adverse event or interaction    Vitamin deficiencies such as Wernicke's encephalopathy    CNS lesion, injury, infection (CVA, subdural hematoma, meningitis, encephalitis)    Alterations in hormones such as thyroid or adrenal abnormalities    Alterations in cardiac functioning such as arrhythmia, MI or CHF    Alteration in temperature such as hyperthermia or hypothermia    Dehydration, sleep deprivation   Change in medical regimen    Alteration in lifestyle, environment, or personal relationships            Final Impression    1. Acute respiratory failure with hypoxia ([address]    2. Severe sepsis with septic shock (CODE) ([address]    3. Acute kidney injury ([address]    4.  Cirrhosis of liver with ascites, unspecified hepatic cirrhosis type ([facility]    5. Hepatic encephalopathy ([facility])        Blood pressure (!) 106/57, pulse 135, temperature 101 °F (38.3 °C), temperature source Rectal, resp. rate 21, weight 135 lb (61.2 kg), last menstrual period 11/12/2012, SpO2 100 %. Disposition      Pt is in critical condition upon Admit to CCU/ICU. Please note, critical care time was at least 120 minutes, obtaining history, conducting a physical exam, performing and monitoring interventions, ordering, collecting and interpreting tests, and establishing medical decision-making and discussion with the patient and/or family, specifically for management of the presenting complaint and symptoms initially, direct bedside care, reevaluation, review of records, and consultation. There was a high probability of clinically significant life-threatening deterioration in the patient's condition, which required my urgent intervention. This time does not include separately billable procedures. The note was completed using Dragon voice recognition transcription. Every effort was made to ensure accuracy; however, inadvertent transcription errors may be present despite my best efforts to edit errors.     Marlene Davidson MD  [id number] MD [place]  02/23/21 [id number]

## 2021-02-22 NOTE — FLOWSHEET NOTE
offered prayer at bedside following patient's death, per family request.  Family tearful but calm, stating that they had been anticipating this loss for a while. 02/22/21 0441   Encounter Summary   Services provided to: Family   Referral/Consult From: Nurse   Grief and Life Adjustment   Type Death   Assessment Calm; Approachable;Tearful;Grieving;Coping   Intervention Active listening;Prayer;Discussed relationship with God   Outcome Expressed gratitude;Engaged in conversation;Expressed feelings/needs/concerns; Tearful

## 2021-02-22 NOTE — H&P
tablet TAKE 1 TABLET BY MOUTH TWICE A DAY 11/4/20   Historical Provider, MD   albuterol sulfate HFA (VENTOLIN HFA) 108 (90 Base) MCG/ACT inhaler Inhale 2 puffs into the lungs 4 times daily as needed for Wheezing 10/23/20   Jhonatan Laboy MD   GABAPENTIN PO Take 800 mg by mouth 3 times daily Unsure of dosage     Historical Provider, MD   QUEtiapine Fumarate (SEROQUEL PO) Take 100 mg by mouth nightly     Historical Provider, MD       Allergies:  Flexeril [cyclobenzaprine], Ultram [tramadol hcl], and Robaxin [methocarbamol]    Social History:    TOBACCO:   reports that she quit smoking about 5 months ago. She has a 40.00 pack-year smoking history. She has never used smokeless tobacco.  ETOH:   reports no history of alcohol use. Family History:  Reviewed in detail and negative for DM, Early CAD, Cancer (except as below). Positive as follows:        Problem Relation Age of Onset    Diabetes Father     Heart Disease Father        REVIEW OF SYSTEMS:   Unable to obtain   PHYSICAL EXAM PERFORMED:    BP 94/65   Pulse 107   Temp 99.3 °F (37.4 °C) (Bladder)   Resp (!) 35   Ht 5' 6\" (1.676 m)   Wt 135 lb (61.2 kg)   LMP 11/12/2012   SpO2 91%   BMI 21.79 kg/m²     GEN:  Intubated and sedated, NAD. Anasarca. HEENT:  NC/AT,EOMI, dry MM, no erythema/exudates or visible masses. CVS:  Normal S1,S2. Tachy RRR. Without M/G/R.   LUNG:   Mechanical central breath sounds. No wheezes, rales or rhonchi  ABD:  Soft, ++ distension w/ + fluid wave, BS+ x4. Without G/R.  EXT: 2+ pulses, no c/c. Diffuse 3+ edema. Brisk cap refill. PSY:  Thought process sedated, affect sedated. NAV:  CN grossly III-XII intact. Does not interact. Does not respond to noxious stimuli. SKIN: Marked edema w/ some seeping areas on lower ext.      Chart review shows recent radiographs:  Ct Head Wo Contrast    Result Date: 2/21/2021  EXAMINATION: CT OF THE HEAD WITHOUT CONTRAST  2/21/2021 5:31 pm TECHNIQUE: CT of the head was performed without the administration of intravenous contrast. Dose modulation, iterative reconstruction, and/or weight based adjustment of the mA/kV was utilized to reduce the radiation dose to as low as reasonably achievable. COMPARISON: 02/02/2021 HISTORY: ORDERING SYSTEM PROVIDED HISTORY: AMS TECHNOLOGIST PROVIDED HISTORY: Reason for exam:->AMS Has a \"code stroke\" or \"stroke alert\" been called? ->No Decision Support Exception->Emergency Medical Condition (MA) Reason for Exam: ams FINDINGS: BRAIN/VENTRICLES: There is no acute intracranial hemorrhage, mass effect or midline shift. No abnormal extra-axial fluid collection. The gray-white differentiation is maintained without evidence of an acute infarct. There is no evidence of hydrocephalus. ORBITS: The visualized portion of the orbits demonstrate no acute abnormality. SINUSES: The visualized paranasal sinuses and mastoid air cells demonstrate no acute abnormality. SOFT TISSUES/SKULL:  No acute abnormality of the visualized skull or soft tissues. No acute intracranial abnormality. Ct Head Wo Contrast    Result Date: 2/2/2021  EXAMINATION: CT OF THE HEAD WITHOUT CONTRAST  2/2/2021 3:27 pm TECHNIQUE: CT of the head was performed without the administration of intravenous contrast. Dose modulation, iterative reconstruction, and/or weight based adjustment of the mA/kV was utilized to reduce the radiation dose to as low as reasonably achievable. COMPARISON: 05/25/2019 HISTORY: ORDERING SYSTEM PROVIDED HISTORY: ams, ? fall off chair TECHNOLOGIST PROVIDED HISTORY: Reason for exam:->ams, ? fall off chair Has a \"code stroke\" or \"stroke alert\" been called? ->No Decision Support Exception->Emergency Medical Condition (MA) Reason for Exam: ams Acuity: Unknown Type of Exam: Unknown FINDINGS: BRAIN/VENTRICLES: There is no acute intracranial hemorrhage, mass effect or midline shift. No abnormal extra-axial fluid collection.   The gray-white differentiation is maintained without evidence of an acute infarct. There is no evidence of hydrocephalus. ORBITS: The visualized portion of the orbits demonstrate no acute abnormality. SINUSES: The visualized paranasal sinuses and mastoid air cells demonstrate no acute abnormality. SOFT TISSUES/SKULL:  No acute abnormality of the visualized skull or soft tissues. No acute intracranial abnormality. Ct Cervical Spine Wo Contrast    Result Date: 2/2/2021  EXAMINATION: CT OF THE CERVICAL SPINE WITHOUT CONTRAST 2/2/2021 3:28 pm TECHNIQUE: CT of the cervical spine was performed without the administration of intravenous contrast. Multiplanar reformatted images are provided for review. Dose modulation, iterative reconstruction, and/or weight based adjustment of the mA/kV was utilized to reduce the radiation dose to as low as reasonably achievable. COMPARISON: None. HISTORY: ORDERING SYSTEM PROVIDED HISTORY: fall, ams TECHNOLOGIST PROVIDED HISTORY: Reason for exam:->fall, ams Decision Support Exception->Emergency Medical Condition (MA) Reason for Exam: ams Acuity: Unknown Type of Exam: Unknown FINDINGS: BONES/ALIGNMENT: No evidence of fracture or subluxation DEGENERATIVE CHANGES: Degenerative disc disease C5-C6 and C6-C7. Facet osteoarthritis C2-C3 through C4-C5 on the left, and C4-C5 on the right SOFT TISSUES: No prevertebral soft tissue swelling. Ground-glass infiltrates in the left upper lobe     1. No acute abnormality of the cervical spine 2. Left upper lobe ground-glass infiltrate suspicious for pneumonia. Correlate with chest x-ray     Ct Abdomen Pelvis W Iv Contrast Additional Contrast? None    Result Date: 1/24/2021  EXAMINATION: CTA OF THE CHEST; CT OF THE ABDOMEN AND PELVIS WITH CONTRAST 1/24/2021 6:49 am TECHNIQUE: CTA of the chest was performed after the administration of intravenous contrast.  Multiplanar reformatted images are provided for review. MIP images are provided for review.  Dose modulation, iterative reconstruction, and/or weight based adjustment of the mA/kV was utilized to reduce the radiation dose to as low as reasonably achievable.; CT of the abdomen and pelvis was performed with the administration of intravenous contrast. Multiplanar reformatted images are provided for review. Dose modulation, iterative reconstruction, and/or weight based adjustment of the mA/kV was utilized to reduce the radiation dose to as low as reasonably achievable. COMPARISON: 10/19/2020 and 01/14/2021 CT HISTORY: ORDERING SYSTEM PROVIDED HISTORY: elevated d dimer. soa TECHNOLOGIST PROVIDED HISTORY: Reason for exam:->elevated d dimer. soa Decision Support Exception->Emergency Medical Condition (MA) Reason for Exam: sob, abd distention Acuity: Unknown Type of Exam: Unknown; ORDERING SYSTEM PROVIDED HISTORY: distended abdomen. TECHNOLOGIST PROVIDED HISTORY: Reason for exam:->distended abdomen. Additional Contrast?->None Reason for Exam: sob, abd distention FINDINGS: Chest: Pulmonary arteries: Study is of good technical quality for evaluation of pulmonary embolism. There are no filling defects to suggest pulmonary embolism. Main pulmonary artery is normal in caliber. No evidence of right ventricular strain. Mediastinum: The heart size is normal.  The aorta is normal.  Ill-defined mediastinal and hilar lymph nodes do not appear to be significantly enlarged, similar to prior CT. Thyroid and esophagus are unremarkable. Lungs/pleura: Airways are patent. Moderate peribronchial thickening in the bilateral lower lobes. Large right pleural effusion appears chronic with significant atelectasis of the right lower lobe. Perhaps slight decrease in volume of fluid and improved aeration of the superior segment since prior CT. Small left pleural effusion stable. Moderate underlying centrilobular emphysema. Scattered ground-glass airspace opacification in the bilateral upper lobes with distribution and extent different from prior CT. Overall slight improvement.   No discrete mass or nodule. Soft Tissues/Bones: Remote T11 compression fracture stable with no interval detrimental change. Abdomen/Pelvis: Organs: Cirrhotic morphology of the liver with no focal lesion detected on current exam.  There is dilation of the portal veins with near occlusive thrombus in close proximity to the portal confluence that is new from prior imaging. The spleen is not enlarged. Massive ascites throughout the peritoneum. Cholelithiasis without biliary dilatation. Pancreas is normal. The adrenal glands are normal.  Punctate nonobstructing calculi are present in both kidneys. GI/Bowel: Limited evaluation of GI tract given pattern of ascites. No obvious mucosal abnormality proximally. The appendix is not clearly identified. Questionable mucosal edema involving the cecum and right colon which is difficult to characterize. Pelvis: The bladder is distended. No mucosal thickening. The uterus and adnexa are normal.  Calcified leiomyoma demonstrated. Peritoneum/Retroperitoneum: The aorta tapers normally. No lymphadenopathy. Bones/Soft Tissues: No significant skeletal abnormalities. 1. No pulmonary embolism. 2. Chronic bilateral pleural effusions with perhaps slight decrease in volume on the right. There is significant atelectasis of the right lower lobe, also slightly improved. 3. Patchy ground-glass opacification in the upper lobes. Change and pattern would favor edema or chronic inflammatory process. 4. Cirrhosis and portal hypertension. Near occlusive thrombus in the main portal vein just proximal to the portal confluence. 5. Massive ascites. 6. Limited evaluation of GI tract given above. Questionable mucosal edema of the cecum and right colon. This may be artifact. Underlying colitis may be considered clinically. Xr Chest Portable    Result Date: 2/21/2021  EXAMINATION: ONE XRAY VIEW OF THE CHEST 2/21/2021 2:28 pm COMPARISON: Chest radiograph from earlier the same day.  HISTORY: ORDERING SYSTEM PROVIDED HISTORY: Central line placement TECHNOLOGIST PROVIDED HISTORY: Reason for exam:->Central line placement Reason for Exam: ETT placement, ng Acuity: Unknown Type of Exam: Unknown FINDINGS: A portable frontal view chest radiograph was obtained. The endotracheal tube ends appropriately above the oly and below the clavicular heads. The nasogastric tube ends in the stomach. The right jugular vein central line ends in the distal SVC. Has essentially unchanged appearance of mild to moderate diffuse interstitial and alveolar opacities throughout the lungs bilaterally. The appearance is nonspecific and may be due to pulmonary edema or multilobar pneumonia, including an active viral pneumonitis. No pneumothorax or significant pleural effusion. Xr Chest Portable    Result Date: 2/21/2021  EXAMINATION: ONE XRAY VIEW OF THE CHEST 2/21/2021 1:57 pm COMPARISON: 02/02/2021 HISTORY: ORDERING SYSTEM PROVIDED HISTORY: sob TECHNOLOGIST PROVIDED HISTORY: Reason for exam:->sob Reason for Exam: increased sob Acuity: Unknown Type of Exam: Unknown FINDINGS: Heart size and pulmonary vasculature within normal limits. Small focus of airspace disease in the peripheral mid right lung. Lungs otherwise grossly clear costophrenic angles sharp     Small airspace infiltrate in a peripheral mid to lower right lung most compatible with a focus of pneumonia     Xr Chest Portable    Result Date: 2/2/2021  EXAMINATION: ONE XRAY VIEW OF THE CHEST 2/2/2021 1:52 pm COMPARISON: 01/24/2021 HISTORY: ORDERING SYSTEM PROVIDED HISTORY: ams TECHNOLOGIST PROVIDED HISTORY: Reason for exam:->ams Reason for Exam: ams Acuity: Acute Type of Exam: Initial FINDINGS: Expiration results in vascular crowding. The heart is within normal limits. There is ill definition of the pulmonary vascularity. There is subtle increased density in the right lung base laterally. Pulmonary venous congestion.  Subtle increased density in the right lung base laterally may represent atelectasis or mild pneumonia     Xr Chest Portable    Result Date: 1/24/2021  EXAMINATION: ONE XRAY VIEW OF THE CHEST 1/24/2021 2:57 am COMPARISON: Chest portable January 14, 2021. HISTORY: ORDERING SYSTEM PROVIDED HISTORY: other TECHNOLOGIST PROVIDED HISTORY: Reason for exam:->other Reason for Exam: SOB Acuity: Acute Type of Exam: Initial Additional signs and symptoms: SOB pt denies chest pain FINDINGS: The heart is normal in size and configuration. The mediastinal contours are within normal limits. Interval increased volume of now moderate right-sided layering pleural effusion is seen with adjacent compressive atelectasis present. Diffuse interstitial prominence is noted within the lungs. . Right 7th rib healed fracture is again evident. Bones and soft tissues are otherwise unremarkable. 1. Interval increased volume of now moderate right-sided layering pleural effusion with adjacent compressive right basilar atelectasis. 2. Diffuse mild interstitial prominence suggestive of mild interstitial edema. Ct Chest Pulmonary Embolism W Contrast    Result Date: 1/24/2021  EXAMINATION: CTA OF THE CHEST; CT OF THE ABDOMEN AND PELVIS WITH CONTRAST 1/24/2021 6:49 am TECHNIQUE: CTA of the chest was performed after the administration of intravenous contrast.  Multiplanar reformatted images are provided for review. MIP images are provided for review. Dose modulation, iterative reconstruction, and/or weight based adjustment of the mA/kV was utilized to reduce the radiation dose to as low as reasonably achievable.; CT of the abdomen and pelvis was performed with the administration of intravenous contrast. Multiplanar reformatted images are provided for review. Dose modulation, iterative reconstruction, and/or weight based adjustment of the mA/kV was utilized to reduce the radiation dose to as low as reasonably achievable.  COMPARISON: 10/19/2020 and 01/14/2021 CT HISTORY: ORDERING SYSTEM PROVIDED HISTORY: elevated d dimer. soa TECHNOLOGIST PROVIDED HISTORY: Reason for exam:->elevated d dimer. soa Decision Support Exception->Emergency Medical Condition (MA) Reason for Exam: sob, abd distention Acuity: Unknown Type of Exam: Unknown; ORDERING SYSTEM PROVIDED HISTORY: distended abdomen. TECHNOLOGIST PROVIDED HISTORY: Reason for exam:->distended abdomen. Additional Contrast?->None Reason for Exam: sob, abd distention FINDINGS: Chest: Pulmonary arteries: Study is of good technical quality for evaluation of pulmonary embolism. There are no filling defects to suggest pulmonary embolism. Main pulmonary artery is normal in caliber. No evidence of right ventricular strain. Mediastinum: The heart size is normal.  The aorta is normal.  Ill-defined mediastinal and hilar lymph nodes do not appear to be significantly enlarged, similar to prior CT. Thyroid and esophagus are unremarkable. Lungs/pleura: Airways are patent. Moderate peribronchial thickening in the bilateral lower lobes. Large right pleural effusion appears chronic with significant atelectasis of the right lower lobe. Perhaps slight decrease in volume of fluid and improved aeration of the superior segment since prior CT. Small left pleural effusion stable. Moderate underlying centrilobular emphysema. Scattered ground-glass airspace opacification in the bilateral upper lobes with distribution and extent different from prior CT. Overall slight improvement. No discrete mass or nodule. Soft Tissues/Bones: Remote T11 compression fracture stable with no interval detrimental change. Abdomen/Pelvis: Organs: Cirrhotic morphology of the liver with no focal lesion detected on current exam.  There is dilation of the portal veins with near occlusive thrombus in close proximity to the portal confluence that is new from prior imaging. The spleen is not enlarged. Massive ascites throughout the peritoneum. Cholelithiasis without biliary dilatation.   Pancreas is normal. The adrenal glands are normal.  Punctate nonobstructing calculi are present in both kidneys. GI/Bowel: Limited evaluation of GI tract given pattern of ascites. No obvious mucosal abnormality proximally. The appendix is not clearly identified. Questionable mucosal edema involving the cecum and right colon which is difficult to characterize. Pelvis: The bladder is distended. No mucosal thickening. The uterus and adnexa are normal.  Calcified leiomyoma demonstrated. Peritoneum/Retroperitoneum: The aorta tapers normally. No lymphadenopathy. Bones/Soft Tissues: No significant skeletal abnormalities. 1. No pulmonary embolism. 2. Chronic bilateral pleural effusions with perhaps slight decrease in volume on the right. There is significant atelectasis of the right lower lobe, also slightly improved. 3. Patchy ground-glass opacification in the upper lobes. Change and pattern would favor edema or chronic inflammatory process. 4. Cirrhosis and portal hypertension. Near occlusive thrombus in the main portal vein just proximal to the portal confluence. 5. Massive ascites. 6. Limited evaluation of GI tract given above. Questionable mucosal edema of the cecum and right colon. This may be artifact. Underlying colitis may be considered clinically. Us Guided Paracentesis    Result Date: 2/3/2021  PROCEDURE: ULTRASOUND GUIDED PARACENTESIS 2/3/2021 HISTORY: ORDERING SYSTEM PROVIDED HISTORY: ascites TECHNOLOGIST PROVIDED HISTORY: Reason for exam:->ascites Recurrent ascites. TECHNIQUE: Informed consent was obtained after a detailed explanation of the procedure including risks, benefits, and alternatives. Universal protocol was followed. The right abdomen was prepped and draped in sterile fashion and local anesthesia was achieved with lidocaine. A 5 Filipino needle sheath was advanced under ultrasound guidance into ascites and paracentesis was performed. The patient tolerated the procedure well.  FINDINGS: A total of 2 L was removed. A sample was sent for laboratory analysis. Successful ultrasound guided paracentesis. Us Guided Paracentesis    Result Date: 1/26/2021  PROCEDURE: ULTRASOUND GUIDED PARACENTESIS 1/26/2021 HISTORY: ORDERING SYSTEM PROVIDED HISTORY: large volume paracentesis please TECHNOLOGIST PROVIDED HISTORY: Reason for exam:->large volume paracentesis please TECHNIQUE: Informed consent was obtained after a detailed explanation of the procedure including risks, benefits, and alternatives. Preliminary scanning of the abdomen shows a large amount of ascites to be present. Permanent sonographic images were recorded. [place] protocol was followed. The right abdomen was prepped and draped in sterile fashion and local anesthesia was achieved with lidocaine. A 5 Tongan multi side-hole catheter was inserted into the abdomen using trocar technique. The catheter was attached to vacuum bottles and the paracentesis was performed. Free flow of clear yellow fluid observed. The patient tolerated the procedure well. Fuse returned to her room in good condition. FINDINGS: A total of 1550 mL of clear yellow fluid was removed. Fluid sent to the lab for the ordered studies. Successful ultrasound guided paracentesis. Study performed by Dr. Phi Kaba. Us Guided Paracentesis    Result Date: 1/25/2021  PROCEDURE: ULTRASOUND GUIDED PARACENTESIS 1/25/2021 HISTORY: ORDERING SYSTEM PROVIDED HISTORY: Decompensated cirrhosis TECHNOLOGIST PROVIDED HISTORY: Reason for exam:->Decompensated cirrhosis TECHNIQUE: Informed consent was obtained after a detailed explanation of the procedure including risks, benefits, and alternatives. Universal protocol was followed. The right abdomen was prepped and draped in sterile fashion and local anesthesia was achieved with lidocaine. A 5 Tongan needle sheath was advanced under ultrasound guidance into ascites and paracentesis was performed.   The patient tolerated the procedure well. FINDINGS: A total of 2 L was removed. Successful ultrasound guided paracentesis. EKG 12 Lead [[id number]]    Collected: 02/21/21 1342    Updated: 02/21/21 1410     Ventricular Rate 139 BPM    Atrial Rate 139 BPM    P-R Interval 80 ms    QRS Duration 70 ms    Q-T Interval 292 ms    QTc Calculation (Bazett) 444 ms    P Axis 81 degrees    R Axis 68 degrees    T Axis 57 degrees    Diagnosis Sinus tachycardia with short NE with Premature supraventricular complexesNonspecific ST abnormalityConfirmed by ASTRID RIBERA MD ([id number]) on 2/21/2021 2:10:14 PM     CBC:  Recent Labs     02/21/21  1342 02/21/21  1816   WBC 1.1*  --    HGB 10.3* 8.8*   HCT 31.1*  --    PLT 71*  --         RENAL  Recent Labs     02/21/21  1342 02/21/21  1600   *  --    K 4.0  --    CL 96*  --    CO2 14*  --    BUN 20  --    CREATININE 1.4*  --    GLUCOSE 8* 11     LFT'S:  Recent Labs     02/21/21  1342   *   ALT 63*   BILITOT 3.0*   ALKPHOS 79       COAG:  Recent Labs     02/21/21  1342   INR 3.13*       CARDIAC ENZYMES:   Recent Labs     02/21/21  1342 02/21/21  1600 02/21/21  1807   TROPONINI 0.04* 0.04* 0.04*     Lab Results   Component Value Date    PROBNP 132 (H) 01/24/2021    PROBNP 393 (H) 10/15/2020       LACTIC ACID:  Recent Labs     02/21/21  1342 02/21/21  1600   LACTA 10.5* 9.6*     ABG:   Recent Labs     02/21/21  1709 02/21/21  1816 02/21/21 2020   PHART 6.840* 6.899* 7.043*   IDW9RTX 75.4* 77.3* 36.1   PO2ART 65.2* 59.3* 434.6*   DPK9GSV 12.6* 15.1* 9.6*   H2KLIUEB 71.3* 68* 99.7   YJZ3GCX 14.9  --  10.7   BEART -21.2* -18* -19.7*     PHYSICIAN CERTIFICATION  I certify that Maria L Schilling is expected to be hospitalized for 2 midnights based on the following assessment and plan:    ASSESSMENT/PLAN:  1. Shock, septic vs distributive, favoring former. IV vanco and Zosyn. Pt is on vaso and levophed, adding epi. IVF. Steroids.   F/u cx.    2. Acute respiratory failure with hypoxia and hypercapnia, likely related to volume OL, supplemental O2 to maintain SPO2 ? 92%, continuous pulse ox. Pt intubated in the ED. Wean as tolerated. Continue vent per my orders until seen by Pulm Service. Intensivist c/s. 3. Hypoglycemia, persistent, changed from D5 to D10. Hypoglycemia protocol. q1 bgt for now. 4. Decompensated cirrhosis w/ hyperammonemia and hepatic encephalopathy. NH3 102. Cont lactulose. 5. Combined metabolic and resp acidosis. 6. Polysubstance abuse, tox pending. Pt's children and mother report that she is actively using. 7. Severe lactic acidosis, 10.5, IVF and repeats ordered. 8. Pancytopenia w/ moderate neutropenia, neutropenia precautions. Likely related to cirrhosis. 9. HERNANDEZ, Cr 1.4, IVF and monitor. DVT Prophylaxis: Eliquis  Diet: NPO  Code Status: Full Code, however, family is considering changing that at this time. PT/OT Eval Status: Will order if needed and as patient condition allows  Dispo - Admit to inpatient ICU. Pt is in critical condition w/ poor prognosis. Met w/ 2 of pt's daughters and her mother to discuss this very ill pt. Total critical care time caring for this patient with life threatening, unstable organ failure, including direct patient contact, management of life support systems, review of data including imaging and labs, discussions with other team members and physicians is 65 minutes so far today, excluding procedures. Quan Bravo MD    Thank you Tootie Tony PA-C for the opportunity to be involved in this patient's care. If you have any questions or concerns please feel free to contact me via the [facility] at [phone]. This chart was generated using the [address] [place] [address] [employer] system. I created this record but it may contain dictation errors given the limitations of this technology.

## 2021-02-22 NOTE — PROGRESS NOTES
Keesha spoke with 2 adult daughters to discuss code status. Meri Huynh and Karely agreed to LIMITED CODE status, no CPR or shock. Keesha placed order.  Nicolle Rico Clinical

## 2021-02-22 NOTE — DISCHARGE SUMMARY
Pt admitted immediately prior to this shift. Please see my H&P from beginning of this shift and the RN notes for details of this brief admission. Pt's family decided to make the pt a limited code w/ no CPR or shock. Pt was pronounced  at [address] on 21. Family is aware.

## 2021-02-22 NOTE — PROGRESS NOTES
TOD 0345 determined by 2 RN's. Pt has no pulse no respirations. No reflexes present.      Electronically signed by Meme Prieto RN on 2/22/2021 at 4:33 AM

## 2021-02-22 NOTE — PROGRESS NOTES
Per verbal orders from Dr Geovanni Baez: if pt heart rhythm changes to shockable or PEA rhythm, turn machines off and give pt 10 mg of morphine for pt comfort. Family was agreeable to these conditions.

## 2021-02-22 NOTE — CONSULTS
Pharmacy Note  Vancomycin Consult    Chris Frank is a 64 y.o. female started on Vancomycin for sepsis; consult received from Dr. Jelani Arnold to manage therapy. Also receiving the following antibiotics: zosyn. Patient Active Problem List   Diagnosis    Cubital tunnel syndrome on left    Cellulitis of lower extremity    Leg edema, left    Seizure disorder (HCC)    Opioid abuse ([facility] [address]    Elevated d-dimer    Acute encephalopathy    Pneumonia    Community acquired pneumonia    Acute respiratory failure with hypercapnia ([facility] [address]    COPD exacerbation ([facility] [address]    Hyponatremia    Streptococcal bacteremia    Hepatic cirrhosis due to chronic hepatitis C infection ([facility] [address]    Decompensation of cirrhosis of liver (HCC)    Generalized abdominal pain    Portal vein thrombosis    Acute hepatic encephalopathy    Altered mental status    Hyperammonemia (HCC)    Thrombocytopenia (HCC)    Polysubstance (excluding opioids) dependence, daily use (HCC)    AMS (altered mental status)     Allergies:  Flexeril [cyclobenzaprine], Ultram [tramadol hcl], and Robaxin [methocarbamol]     Temp max:     Recent Labs     02/21/21  1342   BUN 20   CREATININE 1.4*   WBC 1.1*     No intake or output data in the 24 hours ending 02/21/21 2002  Culture Date      Source                       Results      Ht Readings from Last 1 Encounters:   02/21/21 5' 6\" (1.676 m)        Wt Readings from Last 1 Encounters:   02/21/21 135 lb (61.2 kg)       Body mass index is 21.79 kg/m². Estimated Creatinine Clearance: 41 mL/min (A) (based on SCr of 1.4 mg/dL (H)). Goal Trough Level: 15-20 mcg/mL    Assessment/Plan:  Patient received vancomycin 1250mg ivpb x1 dose in ER at 1625 on 2-21-21. Please start vancomycin 1 gram ivpb q24h at 1600 on 2-22-21. Please check vancomycin trough 2-24-21 at 1530. Thank you for the consult. Will continue to follow. [address] Ph.  2/21/2021 8:04 PM

## 2021-02-22 NOTE — PROGRESS NOTES
Report given to Tiffany Ron RN at bedside for transfer of care. Dr. Nimesh Chamberlain at bedside.   Eric Arnold RN

## 2021-02-22 NOTE — PROGRESS NOTES
02/21/21 2051   Vent Information   Vent Mode AC/VC   Vt Ordered 450 mL   Rate Set 35 bmp   Peak Flow 90 L/min   FiO2  100 %   SpO2 91 %   Sensitivity 3   PEEP/CPAP 14   Vent Patient Data   Peak Inspiratory Pressure 38 cmH2O   Mean Airway Pressure 22 cmH20   Rate Measured 35 br/min   Vt Exhaled 482 mL   Minute Volume 16.8 Liters   I:E Ratio 1:2.2   Cough/Sputum   Sputum How Obtained Suctioned;Endotracheal   Cough Non-productive   Sputum Amount None

## 2021-02-22 NOTE — PROGRESS NOTES
Pt arrived to unit and transferred to bed and ICU monitoring. Call out to Dr. Socrates Weiss, pt O2 sat 70% on 16 peep and 100%. ST, on levo and continues to be hypotensive.   Garcia Arnold RN

## 2021-02-22 NOTE — PROGRESS NOTES
I have been notified of the new consult request for us to see Ms. Kincaid at 12:53am on 02/22/21. Per conversation with ICU staff and nocturnist, patient has continued to clinically decline and is currently requiring multiple vasopressors for support. Given patient's lack of neurologic responses and that patient is currently on mechanical ventilation without sedation, family has decided to make the patient limited code and limit further escalation of care. Given family's decision, ESLD, and current clinical status/rapid decline, patient is not a candidate for CRRT at this time. Agree with continued bicarb supplementation (IVP+IVF; though already s/p >450mEq IVP bicarb) at this time, but given patient's current clinical status, there is little else that can be done at this time.         Doni Zacarias MD    For questions or concerns please contact us at   Telephone: [phone]

## 2021-02-22 NOTE — PROCEDURES
This procedure was performed emergently 2/2 pt instability. Abd was fairly distended/taught. Thought to be impairing diaphram's movement. The area was cleansed and draped in usual sterile fashion using chlorhexidine scrub. Anesthesia was achieved with 1% lidocaine. The RLQ of the abdomen was prepped and draped in a sterile fashion using chlorhexidine scrub. 1% lidocaine was used to numb the skin, soft tissue and peritoneum. The paracentesis catheter was inserted and advanced with negative pressure until medium yellow colored clear fluid was aspirated. Trochar needle was withdrawn and plastic vascular catheter was advanced. Approximately 60 mL of ascitic fluid was collected and sent for laboratory analysis. The catheter was then connected to the vaccutainer and 1.5 liters of additional ascitic fluid were drained. ~1.6 L total.  The catheter was removed and no leaking was noted. A bandaid was placed over the puncture wound. The patient tolerated the procedure well without any immediate complications. Estimated blood loss was < 1 cc.

## 2021-02-22 NOTE — PROGRESS NOTES
Pt has a more sudden decline with BP. Levophed and Epinephrine have been titrated up quickly and have about 25% left on range of each before reaching maximum level.  Doctor was notified

## 2021-02-23 LAB
ORGANISM: ABNORMAL
REPORT: NORMAL
URINE CULTURE, ROUTINE: ABNORMAL

## 2021-02-24 LAB
BODY FLUID CULTURE, STERILE: ABNORMAL
GRAM STAIN RESULT: ABNORMAL
ORGANISM: ABNORMAL

## 2021-02-25 LAB
CULTURE, BLOOD 2: ABNORMAL
CULTURE, BLOOD 2: ABNORMAL
ORGANISM: ABNORMAL
ORGANISM: ABNORMAL

## 2021-02-27 LAB
BLOOD CULTURE, ROUTINE: ABNORMAL
ORGANISM: ABNORMAL

## 2021-02-28 LAB — Lab: 13.5 MMOL/L

## 2022-01-01 NOTE — CARE COORDINATION
DISCHARGE ORDER  Date/Time 2018 12:12 PM  Completed by: You Lazaro, Case Management    Patient Name: Hakan Gray    : 1960  Admitting Diagnosis: Cellulitis of left lower extremity [L03.116]  Admit Date/Time: 2018 11:52 AM    Noted discharge order.    Confirmed discharge plan with patient / family (***): {YES/NO:71501}   Discharge Plan: ***
concerns addressed.
Diagnostic testing not indicated for today's encounter
